# Patient Record
Sex: MALE | Race: WHITE | Employment: OTHER | URBAN - METROPOLITAN AREA
[De-identification: names, ages, dates, MRNs, and addresses within clinical notes are randomized per-mention and may not be internally consistent; named-entity substitution may affect disease eponyms.]

---

## 2019-01-31 RX ORDER — RUXOLITINIB 10 MG/1
TABLET ORAL
Qty: 60 TABLET | Refills: 3 | Status: SHIPPED | OUTPATIENT
Start: 2019-01-31 | End: 2019-05-24 | Stop reason: SDUPTHER

## 2019-03-21 ENCOUNTER — OFFICE VISIT (OUTPATIENT)
Dept: ONCOLOGY | Age: 69
End: 2019-03-21
Payer: COMMERCIAL

## 2019-03-21 VITALS
SYSTOLIC BLOOD PRESSURE: 145 MMHG | WEIGHT: 193.8 LBS | BODY MASS INDEX: 27.13 KG/M2 | HEART RATE: 82 BPM | DIASTOLIC BLOOD PRESSURE: 62 MMHG | TEMPERATURE: 97.7 F | HEIGHT: 71 IN

## 2019-03-21 DIAGNOSIS — D68.59 HYPERCOAGULABLE STATE (HCC): ICD-10-CM

## 2019-03-21 DIAGNOSIS — D47.1 MYELOPROLIFERATIVE DISORDER (HCC): ICD-10-CM

## 2019-03-21 DIAGNOSIS — D45 POLYCYTHEMIA RUBRA VERA (HCC): ICD-10-CM

## 2019-03-21 PROCEDURE — 99214 OFFICE O/P EST MOD 30 MIN: CPT | Performed by: INTERNAL MEDICINE

## 2019-03-21 RX ORDER — WARFARIN SODIUM 1 MG/1
TABLET ORAL
Refills: 0 | COMMUNITY
Start: 2019-02-03 | End: 2020-07-15 | Stop reason: DRUGHIGH

## 2019-03-21 RX ORDER — WARFARIN SODIUM 10 MG/1
TABLET ORAL
Refills: 0 | COMMUNITY
Start: 2018-12-24 | End: 2020-07-15 | Stop reason: DRUGHIGH

## 2019-03-21 RX ORDER — SAXAGLIPTIN AND METFORMIN HYDROCHLORIDE 5; 1000 MG/1; MG/1
TABLET, FILM COATED, EXTENDED RELEASE ORAL DAILY
Refills: 0 | COMMUNITY
Start: 2019-02-21

## 2019-03-21 RX ORDER — BISACODYL 5 MG/1
40 TABLET, SUGAR COATED ORAL DAILY
Refills: 0 | COMMUNITY
Start: 2019-02-01

## 2019-03-21 RX ORDER — PRIMIDONE 50 MG/1
50 TABLET ORAL
Refills: 0 | COMMUNITY
Start: 2019-03-10

## 2019-03-21 RX ORDER — LISINOPRIL AND HYDROCHLOROTHIAZIDE 25; 20 MG/1; MG/1
1 TABLET ORAL DAILY
Refills: 0 | COMMUNITY
Start: 2018-12-24

## 2019-03-21 ASSESSMENT — ENCOUNTER SYMPTOMS
CHEST TIGHTNESS: 0
VOICE CHANGE: 0
COUGH: 0
EYE DISCHARGE: 0
ABDOMINAL PAIN: 0
EYE REDNESS: 0
WHEEZING: 0
SORE THROAT: 0
SHORTNESS OF BREATH: 0
NAUSEA: 0
COLOR CHANGE: 0
DIARRHEA: 0
BLOOD IN STOOL: 0
EYE ITCHING: 0
CONSTIPATION: 0
VOMITING: 0
TROUBLE SWALLOWING: 0

## 2019-06-27 ENCOUNTER — OFFICE VISIT (OUTPATIENT)
Dept: ONCOLOGY | Age: 69
End: 2019-06-27
Payer: COMMERCIAL

## 2019-06-27 VITALS
RESPIRATION RATE: 18 BRPM | HEIGHT: 71 IN | SYSTOLIC BLOOD PRESSURE: 130 MMHG | TEMPERATURE: 97.8 F | WEIGHT: 195 LBS | DIASTOLIC BLOOD PRESSURE: 63 MMHG | BODY MASS INDEX: 27.3 KG/M2 | HEART RATE: 74 BPM

## 2019-06-27 DIAGNOSIS — D68.59 HYPERCOAGULABLE STATE (HCC): ICD-10-CM

## 2019-06-27 DIAGNOSIS — D47.1 MYELOPROLIFERATIVE DISORDER (HCC): ICD-10-CM

## 2019-06-27 DIAGNOSIS — D45 POLYCYTHEMIA RUBRA VERA (HCC): Primary | ICD-10-CM

## 2019-06-27 PROCEDURE — 99214 OFFICE O/P EST MOD 30 MIN: CPT | Performed by: INTERNAL MEDICINE

## 2019-06-27 RX ORDER — FERROUS SULFATE 325(65) MG
325 TABLET ORAL
COMMUNITY
Start: 2019-02-12 | End: 2020-02-28 | Stop reason: SDUPTHER

## 2019-06-27 ASSESSMENT — ENCOUNTER SYMPTOMS
EYE REDNESS: 0
EYE ITCHING: 0
SHORTNESS OF BREATH: 0
TROUBLE SWALLOWING: 0
CONSTIPATION: 0
COLOR CHANGE: 0
EYE DISCHARGE: 0
COUGH: 0
VOICE CHANGE: 0
VOMITING: 0
WHEEZING: 0
CHEST TIGHTNESS: 0
BLOOD IN STOOL: 0
NAUSEA: 0
ABDOMINAL PAIN: 0
SORE THROAT: 0
DIARRHEA: 0

## 2019-06-27 NOTE — LETTER
6/27/2019     Josiane Ahumada MD   [address]    Dear Dr. Josiane Ahumada MD, and Dr. Tiffany Hancock ref. provider found: Thank you for referring Bia Irvin, 1950, to me for evaluation. Below are the relevant portions of my assessment and plan of care. [facility]  [facility]  [address]  [place] [address]  Dept: [phone]  Dept Fax: [phone]  Meri Lantigua is a 77 y.o. male who presentstoday for follow up of his   Chief Complaint   Patient presents with    Follow-up     polycythemia         HPI  Bia Irvin is a 69-year-old gentleman with history of Maile Clink 2 positive myeloproliferative disorder with thrombocytosis and polycythemia. Had been on hydroxyurea for quite some time but lately has been on Jakafi 10 mg twice a day and his blood counts have been reasonably good he also has a hypercoagulable state secondary to factor V Leiden mutation positivity and lupus anticoagulant and is on ongoing coumadinization and aspirin. Not had any episodes of deep vein thrombosis. CAT scan of the abdomen on 8/23/16 and showed increase in size of the spleen. Bone marrow aspirate and biopsy showed markedly hypercellular marrow with trilineage hematopoiesis and increased megakaryocytes and myelofibrosis suggestive of a myeloproliferative neoplasm. He is doing reasonably good. He has had mild iron deficiency and is on oral iron which has corrected with iron deficiency. On 6/22/19 his hemoglobin was 13.3. White count 10.5 and platelets were 098.      Current Outpatient Medications   Medication Sig Dispense Refill    Multiple Vitamins-Minerals (MULTIVITAMIN ADULT PO) Take 1 tablet by mouth      ferrous sulfate 325 (65 Fe) MG tablet Take 325 mg by mouth      VITAMIN C, CALCIUM ASCORBATE, PO Take by mouth      aspirin 81 MG tablet Take 81 mg by mouth      OMEGA-3 FATTY ACIDS-VITAMIN E PO Take 1 capsule by mouth  JAKAFI 10 MG tablet TAKE 1 TABLET 2 TIMES A DAY 60 tablet 5    lisinopril-hydrochlorothiazide (PRINZIDE;ZESTORETIC) 20-25 MG per tablet   0    warfarin (COUMADIN) 1 MG tablet   0    warfarin (COUMADIN) 10 MG tablet   0    KOMBIGLYZE XR 2.5-1000 MG TB24   0    primidone (MYSOLINE) 50 MG tablet   0    RA OMEPRAZOLE 20 MG EC tablet take 1 tablet by mouth twice a day  0     No current facility-administered medications for this visit. No Known Allergies    History reviewed. No pertinent past medical history. History reviewed. No pertinent surgical history. History reviewed. No pertinent family history. Social History     Tobacco Use    Smoking status: Former Smoker     Last attempt to quit: 1981     Years since quittin.5    Smokeless tobacco: Never Used    Tobacco comment: quit smoking 38 years ago   Substance Use Topics    Alcohol use: Not Currently     Comment: Quit drinking          The Past MedicalHistory, Past Surgical History, Past Family History and Past Social History havebeen reviewed      Review of Systems   Constitutional: Negative for activity change, appetite change, chills, fatigue and fever. HENT: Negative for congestion, hearing loss, mouth sores, nosebleeds, sore throat, tinnitus, trouble swallowing and voice change. Eyes: Negative for discharge, redness, itching and visual disturbance. Respiratory: Negative for cough, chest tightness, shortness of breath and wheezing. Cardiovascular: Negative for chest pain and leg swelling. Gastrointestinal: Negative for abdominal pain, blood in stool, constipation, diarrhea, nausea and vomiting. Genitourinary: Negative for decreased urine volume, difficulty urinating, hematuria and urgency. Musculoskeletal: Negative for arthralgias, joint swelling and myalgias. Skin: Negative for color change, pallor and rash. Neurological: Negative for dizziness, weakness, light-headedness, numbness and headaches. Hematological: Negative for adenopathy. Does not bruise/bleed easily. Psychiatric/Behavioral: Negative for behavioral problems and confusion. Physical Exam   Constitutional: He is oriented to person, place, and time. He appears well-developed and well-nourished. No distress. HENT:   Head: Normocephalic. Eyes: Pupils are equal, round, and reactive to light. No scleral icterus. Neck: Neck supple. No thyromegaly present. Cardiovascular: Normal rate and regular rhythm. Murmur heard. Systolic murmur is present with a grade of 3/6. Pulmonary/Chest: Effort normal and breath sounds normal. No respiratory distress. He has no wheezes. He has no rales. Abdominal: Soft. He exhibits no mass. There is no hepatosplenomegaly. There is no tenderness. Musculoskeletal: He exhibits no edema or tenderness. Lymphadenopathy:     He has no cervical adenopathy. He has no axillary adenopathy. Neurological: He is alert and oriented to person, place, and time. No cranial nerve deficit. Skin: Skin is warm and dry. No cyanosis. Nails show no clubbing. Psychiatric: He has a normal mood and affect. His behavior is normal. Thought content normal.   Nursing note and vitals reviewed. No results found for this or any previous visit. ASSESSMENT:      Diagnosis Orders   1. Polycythemia rubra vera ([facility] [address]  CBC Auto Differential    Comprehensive Metabolic Panel   2. Myeloproliferative disorder ([address]     3. Hypercoagulable state ([facility] [address]  CBC Auto Differential    Comprehensive Metabolic Panel     Myeloproliferative disorder with WALLY 2 positivity. He is doing well with the current doses of Jakafi. Hypercoagulable state. Patient is on ongoing coumadinization and is doing well without any recurrent episodes of thrombosis. We'll continue monitoring his CBC monthly and see him back again in 3 months. PLAN:      Return in about 3 months (around 9/27/2019) for polycythemia, hypercoagulable state. Orders Placed This Encounter   Procedures    CBC Auto Differential     Standing Status:   Future     Standing Expiration Date:   6/27/2020    Comprehensive Metabolic Panel     Standing Status:   Future     Standing Expiration Date:   6/27/2020     No orders of the defined types were placed in this encounter. Electronicallysigned by Shekhar Hicks MD on 6/27/2019 at 3:35 PM      If you have questions, please do not hesitate to call me. I look forward to following Vinicio Jimenez along with you.     Sincerely,        Shekhar Hicks MD  Phone: [phone]

## 2019-06-27 NOTE — PROGRESS NOTES
OMEPRAZOLE 20 MG EC tablet take 1 tablet by mouth twice a day  0     No current facility-administered medications for this visit. No Known Allergies    History reviewed. No pertinent past medical history. History reviewed. No pertinent surgical history. History reviewed. No pertinent family history. Social History     Tobacco Use    Smoking status: Former Smoker     Last attempt to quit: 1981     Years since quittin.5    Smokeless tobacco: Never Used    Tobacco comment: quit smoking 38 years ago   Substance Use Topics    Alcohol use: Not Currently     Comment: Quit drinking          The Past MedicalHistory, Past Surgical History, Past Family History and Past Social History havebeen reviewed      Review of Systems   Constitutional: Negative for activity change, appetite change, chills, fatigue and fever. HENT: Negative for congestion, hearing loss, mouth sores, nosebleeds, sore throat, tinnitus, trouble swallowing and voice change. Eyes: Negative for discharge, redness, itching and visual disturbance. Respiratory: Negative for cough, chest tightness, shortness of breath and wheezing. Cardiovascular: Negative for chest pain and leg swelling. Gastrointestinal: Negative for abdominal pain, blood in stool, constipation, diarrhea, nausea and vomiting. Genitourinary: Negative for decreased urine volume, difficulty urinating, hematuria and urgency. Musculoskeletal: Negative for arthralgias, joint swelling and myalgias. Skin: Negative for color change, pallor and rash. Neurological: Negative for dizziness, weakness, light-headedness, numbness and headaches. Hematological: Negative for adenopathy. Does not bruise/bleed easily. Psychiatric/Behavioral: Negative for behavioral problems and confusion. Physical Exam   Constitutional: He is oriented to person, place, and time. He appears well-developed and well-nourished. No distress. HENT:   Head: Normocephalic.    Eyes: Pupils are equal, round, and reactive to light. No scleral icterus. Neck: Neck supple. No thyromegaly present. Cardiovascular: Normal rate and regular rhythm. Murmur heard. Systolic murmur is present with a grade of 3/6. Pulmonary/Chest: Effort normal and breath sounds normal. No respiratory distress. He has no wheezes. He has no rales. Abdominal: Soft. He exhibits no mass. There is no hepatosplenomegaly. There is no tenderness. Musculoskeletal: He exhibits no edema or tenderness. Lymphadenopathy:     He has no cervical adenopathy. He has no axillary adenopathy. Neurological: He is alert and oriented to person, place, and time. No cranial nerve deficit. Skin: Skin is warm and dry. No cyanosis. Nails show no clubbing. Psychiatric: He has a normal mood and affect. His behavior is normal. Thought content normal.   Nursing note and vitals reviewed. No results found for this or any previous visit. ASSESSMENT:      Diagnosis Orders   1. Polycythemia rubra vera ([facility] [address]  CBC Auto Differential    Comprehensive Metabolic Panel   2. Myeloproliferative disorder ([facility] [address]     3. Hypercoagulable state ([facility] [address]  CBC Auto Differential    Comprehensive Metabolic Panel     Myeloproliferative disorder with WALLY 2 positivity. He is doing well with the current doses of Jakafi. Hypercoagulable state. Patient is on ongoing coumadinization and is doing well without any recurrent episodes of thrombosis. We'll continue monitoring his CBC monthly and see him back again in 3 months. PLAN:      Return in about 3 months (around 9/27/2019) for polycythemia, hypercoagulable state. Orders Placed This Encounter   Procedures    CBC Auto Differential     Standing Status:   Future     Standing Expiration Date:   6/27/2020    Comprehensive Metabolic Panel     Standing Status:   Future     Standing Expiration Date:   6/27/2020     No orders of the defined types were placed in this encounter. Electronicallysigned by Sunny Wyman MD on 6/27/2019 at 3:35 PM

## 2019-09-30 ENCOUNTER — OFFICE VISIT (OUTPATIENT)
Dept: ONCOLOGY | Age: 69
End: 2019-09-30
Payer: COMMERCIAL

## 2019-09-30 VITALS
BODY MASS INDEX: 28.03 KG/M2 | WEIGHT: 201 LBS | HEART RATE: 72 BPM | TEMPERATURE: 98 F | RESPIRATION RATE: 18 BRPM | DIASTOLIC BLOOD PRESSURE: 72 MMHG | SYSTOLIC BLOOD PRESSURE: 129 MMHG

## 2019-09-30 DIAGNOSIS — D68.59 HYPERCOAGULABLE STATE (HCC): ICD-10-CM

## 2019-09-30 DIAGNOSIS — D45 POLYCYTHEMIA RUBRA VERA (HCC): Primary | ICD-10-CM

## 2019-09-30 PROCEDURE — 99214 OFFICE O/P EST MOD 30 MIN: CPT | Performed by: INTERNAL MEDICINE

## 2019-09-30 ASSESSMENT — ENCOUNTER SYMPTOMS
ABDOMINAL PAIN: 0
COUGH: 0
NAUSEA: 0
EYE ITCHING: 0
SHORTNESS OF BREATH: 0
COLOR CHANGE: 0
CHEST TIGHTNESS: 0
EYE REDNESS: 0
TROUBLE SWALLOWING: 0
CONSTIPATION: 0
VOICE CHANGE: 0
SORE THROAT: 0
WHEEZING: 0
EYE DISCHARGE: 0
DIARRHEA: 0
VOMITING: 0
BLOOD IN STOOL: 0

## 2019-09-30 NOTE — LETTER
Standing Expiration Date:   9/30/2020    Comprehensive Metabolic Panel     Standing Status:   Future     Standing Expiration Date:   9/30/2020     No orders of the defined types were placed in this encounter. Electronicallysigned by Jay Santana MD on 9/30/2019 at 4:26 PM      If you have questions, please do not hesitate to call me. I look forward to following Leigh Ann Fleming along with you.     Sincerely,        Jay Santana MD  Phone: [phone]

## 2019-09-30 NOTE — PROGRESS NOTES
Pupils are equal, round, and reactive to light. No scleral icterus. Neck: Neck supple. No thyromegaly present. Cardiovascular: Normal rate and regular rhythm. No murmur heard. Pulmonary/Chest: Effort normal and breath sounds normal. No respiratory distress. He has no wheezes. He has no rales. Abdominal: Soft. He exhibits no mass. There is no hepatosplenomegaly. There is no tenderness. Musculoskeletal: He exhibits no edema or tenderness. Lymphadenopathy:     He has no cervical adenopathy. He has no axillary adenopathy. Neurological: He is alert and oriented to person, place, and time. No cranial nerve deficit. Skin: Skin is warm and dry. No cyanosis. Nails show no clubbing. Psychiatric: He has a normal mood and affect. His behavior is normal. Thought content normal.   Nursing note and vitals reviewed. No results found for this or any previous visit. ASSESSMENT:      Diagnosis Orders   1. Polycythemia rubra vera ([facility] [address]  CBC Auto Differential    Comprehensive Metabolic Panel   2. Hypercoagulable state ([address]  CBC Auto Differential    Comprehensive Metabolic Panel     Myeloproliferative disorder with WALLY 2 positivity. He is doing well with the current doses of Jakafi. Hypercoagulable state. Patient is on ongoing coumadinization and is doing well without any recurrent episodes of thrombosis. We'll continue monitoring his CBC monthly and see him back again in 3 months. PLAN:      Return in about 3 months (around 12/30/2019) for polycythemia, hypercoagulable state. Orders Placed This Encounter   Procedures    CBC Auto Differential     Standing Status:   Future     Standing Expiration Date:   9/30/2020    Comprehensive Metabolic Panel     Standing Status:   Future     Standing Expiration Date:   9/30/2020     No orders of the defined types were placed in this encounter.           Electronicallysigned by Beck Cronin MD on 9/30/2019 at 4:26 PM

## 2019-12-23 ENCOUNTER — OFFICE VISIT (OUTPATIENT)
Dept: ONCOLOGY | Age: 69
End: 2019-12-23
Payer: COMMERCIAL

## 2019-12-23 VITALS
RESPIRATION RATE: 18 BRPM | WEIGHT: 194 LBS | DIASTOLIC BLOOD PRESSURE: 82 MMHG | TEMPERATURE: 97.7 F | HEART RATE: 89 BPM | SYSTOLIC BLOOD PRESSURE: 132 MMHG | BODY MASS INDEX: 27.06 KG/M2

## 2019-12-23 DIAGNOSIS — D45 POLYCYTHEMIA RUBRA VERA (HCC): ICD-10-CM

## 2019-12-23 DIAGNOSIS — D47.1 MYELOPROLIFERATIVE DISORDER (HCC): ICD-10-CM

## 2019-12-23 DIAGNOSIS — D68.59 HYPERCOAGULABLE STATE (HCC): Primary | ICD-10-CM

## 2019-12-23 PROCEDURE — 99214 OFFICE O/P EST MOD 30 MIN: CPT | Performed by: INTERNAL MEDICINE

## 2019-12-23 ASSESSMENT — ENCOUNTER SYMPTOMS
BLOOD IN STOOL: 0
NAUSEA: 0
WHEEZING: 0
DIARRHEA: 0
SHORTNESS OF BREATH: 0
VOICE CHANGE: 0
EYE ITCHING: 0
CONSTIPATION: 0
SORE THROAT: 0
VOMITING: 0
TROUBLE SWALLOWING: 0
COLOR CHANGE: 0
EYE REDNESS: 0
EYE DISCHARGE: 0
ABDOMINAL PAIN: 0
COUGH: 0
CHEST TIGHTNESS: 0

## 2020-03-04 RX ORDER — FERROUS SULFATE 325(65) MG
325 TABLET ORAL DAILY
Qty: 30 TABLET | Refills: 1 | Status: SHIPPED | OUTPATIENT
Start: 2020-03-04 | End: 2020-06-02 | Stop reason: SDUPTHER

## 2020-04-06 ENCOUNTER — HOSPITAL ENCOUNTER (OUTPATIENT)
Age: 70
Discharge: HOME | End: 2020-04-06
Payer: COMMERCIAL

## 2020-04-06 ENCOUNTER — HOSPITAL ENCOUNTER (OUTPATIENT)
Dept: LAB | Age: 70
Discharge: HOME | End: 2020-04-06
Payer: COMMERCIAL

## 2020-04-06 LAB
ABSOLUTE EOS #: 0.08 K/UL (ref 0–0.44)
ABSOLUTE IMMATURE GRANULOCYTE: 0.16 K/UL (ref 0–0.3)
ABSOLUTE LYMPH #: 1.44 K/UL (ref 1.1–3.7)
ABSOLUTE MONO #: 0.48 K/UL (ref 0.1–1.2)
ALBUMIN SERPL-MCNC: 5.2 G/DL (ref 3.5–5.2)
ALBUMIN/GLOBULIN RATIO: 2.5 (ref 1–2.5)
ALP BLD-CCNC: 63 U/L (ref 40–129)
ALT SERPL-CCNC: 37 U/L (ref 5–41)
ANION GAP SERPL CALCULATED.3IONS-SCNC: 12 MMOL/L (ref 9–17)
AST SERPL-CCNC: 29 U/L
BASOPHILS # BLD: 2 % (ref 0–2)
BASOPHILS ABSOLUTE: 0.16 K/UL (ref 0–0.2)
BILIRUB SERPL-MCNC: 0.52 MG/DL (ref 0.3–1.2)
BUN BLDV-MCNC: 12 MG/DL (ref 8–23)
BUN/CREAT BLD: 13 (ref 9–20)
CALCIUM SERPL-MCNC: 9.7 MG/DL (ref 8.6–10.4)
CHLORIDE BLD-SCNC: 92 MMOL/L (ref 98–107)
CO2: 26 MMOL/L (ref 20–31)
CREAT SERPL-MCNC: 0.9 MG/DL (ref 0.7–1.2)
DIFFERENTIAL TYPE: ABNORMAL
EOSINOPHILS RELATIVE PERCENT: 1 % (ref 1–4)
GFR AFRICAN AMERICAN: >60 ML/MIN
GFR NON-AFRICAN AMERICAN: >60 ML/MIN
GFR SERPL CREATININE-BSD FRML MDRD: ABNORMAL ML/MIN/{1.73_M2}
GFR SERPL CREATININE-BSD FRML MDRD: ABNORMAL ML/MIN/{1.73_M2}
GLUCOSE BLD-MCNC: 249 MG/DL (ref 70–99)
HCT VFR BLD CALC: 40.8 % (ref 40.7–50.3)
HEMOGLOBIN: 13.4 G/DL (ref 13–17)
IMMATURE GRANULOCYTES: 2 %
INR BLD: 2 (ref 0.9–1.2)
LYMPHOCYTES # BLD: 18 % (ref 24–43)
MCH RBC QN AUTO: 29.6 PG (ref 25.2–33.5)
MCHC RBC AUTO-ENTMCNC: 32.8 G/DL (ref 28.4–34.8)
MCV RBC AUTO: 90.1 FL (ref 82.6–102.9)
MONOCYTES # BLD: 6 % (ref 3–12)
MORPHOLOGY: NORMAL
NRBC AUTOMATED: 1.5 PER 100 WBC
NUCLEATED RED BLOOD CELLS: 1 PER 100 WBC (ref 0–5)
PDW BLD-RTO: 16.7 % (ref 11.8–14.4)
PLATELET # BLD: 165 K/UL (ref 138–453)
PLATELET ESTIMATE: ABNORMAL
PMV BLD AUTO: 10.2 FL (ref 8.1–13.5)
POTASSIUM SERPL-SCNC: 4.6 MMOL/L (ref 3.7–5.3)
PROTHROMBIN TIME: 19.8 SEC (ref 9.7–12.2)
RBC # BLD: 4.53 M/UL (ref 4.21–5.77)
RBC # BLD: ABNORMAL 10*6/UL
SEG NEUTROPHILS: 71 % (ref 36–65)
SEGMENTED NEUTROPHILS ABSOLUTE COUNT: 5.7 K/UL (ref 1.5–8.1)
SODIUM BLD-SCNC: 130 MMOL/L (ref 135–144)
TOTAL PROTEIN: 7.3 G/DL (ref 6.4–8.3)
WBC # BLD: 8 K/UL (ref 3.5–11.3)
WBC # BLD: ABNORMAL 10*3/UL

## 2020-04-06 PROCEDURE — 85025 COMPLETE CBC W/AUTO DIFF WBC: CPT

## 2020-04-06 PROCEDURE — 36415 COLL VENOUS BLD VENIPUNCTURE: CPT

## 2020-04-06 PROCEDURE — 85610 PROTHROMBIN TIME: CPT

## 2020-04-06 PROCEDURE — 80053 COMPREHEN METABOLIC PANEL: CPT

## 2020-04-08 ENCOUNTER — OFFICE VISIT (OUTPATIENT)
Dept: ONCOLOGY | Age: 70
End: 2020-04-08
Payer: COMMERCIAL

## 2020-04-08 VITALS
BODY MASS INDEX: 28.77 KG/M2 | RESPIRATION RATE: 18 BRPM | HEIGHT: 71 IN | WEIGHT: 205.5 LBS | HEART RATE: 81 BPM | TEMPERATURE: 98.1 F | SYSTOLIC BLOOD PRESSURE: 151 MMHG | DIASTOLIC BLOOD PRESSURE: 65 MMHG

## 2020-04-08 PROCEDURE — 99215 OFFICE O/P EST HI 40 MIN: CPT | Performed by: INTERNAL MEDICINE

## 2020-04-08 RX ORDER — RUXOLITINIB 10 MG/1
TABLET ORAL
Qty: 60 TABLET | Refills: 5 | Status: SHIPPED | OUTPATIENT
Start: 2020-04-08 | End: 2020-09-30 | Stop reason: SDUPTHER

## 2020-04-08 NOTE — PATIENT INSTRUCTIONS
Continue current therapy without changes, return in 3 months with CBC, CMP LDH prior to next visit. Continue to follow with Coumadin clinic.

## 2020-04-08 NOTE — PROGRESS NOTES
DIAGNOSIS:   1. History of myeloproliferative disorder , Malcom 2+, likely polycythemia diagnosed in 2016  2. Factor V Leiden mutation and lupus anticoagulant  3. Spent phase polycythemia/myelofibrosis transformation diagnosed in 2019  CURRENT THERAPY:  1. Started hydroxyurea and aspirin  2. Upon diagnosis of myelofibrosis, transition to [facility]  3. Long-term anticoagulation with Coumadin  BRIEF CASE HISTORY:   Afshin Cartwright is a very pleasant 72 y.o. male who was followed by different hematologist, he was diagnosed with myeloproliferative syndrome likely polycythemia vera where he presented with polycythemia and thrombocytosis and he was Malcom 2+. He was started on hydroxyurea for multiple years. In 2019, he started having worsening cytopenias and bone marrow aspiration biopsy showed hypercellular marrow but increased megakaryocyte and increased fibrotic changes suggestive of spent phase polycythemia/myelofibrosis. He was started on Jakafi. The patient also has a history of hypercoagulable condition namely factor V Leiden and lupus anticoagulant and he is on long-term anticoagulation. INTERIM HISTORY:  The patient comes in today for a follow up, feeling well and doing well. He has no fever or chills, no night sweats or weight loss. No abdominal pain or diarrhea. He is tolerating medication very well. He is on Coumadin other than mild bruisability he has no complaints. PAST MEDICAL HISTORY: has no past medical history on file. PAST SURGICAL HISTORY: has no past surgical history on file. CURRENT MEDICATIONS:  has a current medication list which includes the following prescription(s): ferrous sulfate, jakafi, multiple vitamins-minerals, calcium ascorbate, aspirin, omega-3 fatty acids-vitamin e, lisinopril-hydrochlorothiazide, warfarin, warfarin, kombiglyze xr, primidone, and ra omeprazole. ALLERGIES:  has No Known Allergies.     FAMILY HISTORY: Negative for any hematological or oncological

## 2020-06-02 RX ORDER — FERROUS SULFATE 325(65) MG
325 TABLET ORAL DAILY
Qty: 30 TABLET | Refills: 1 | Status: SHIPPED | OUTPATIENT
Start: 2020-06-02 | End: 2022-03-09 | Stop reason: ALTCHOICE

## 2020-06-13 ENCOUNTER — HOSPITAL ENCOUNTER (OUTPATIENT)
Age: 70
Discharge: HOME | End: 2020-06-13
Payer: COMMERCIAL

## 2020-06-13 LAB
ABSOLUTE EOS #: 0.1 K/UL (ref 0–0.44)
ABSOLUTE IMMATURE GRANULOCYTE: 0.57 K/UL (ref 0–0.3)
ABSOLUTE LYMPH #: 1.33 K/UL (ref 1.1–3.7)
ABSOLUTE MONO #: 0.57 K/UL (ref 0.1–1.2)
ALBUMIN SERPL-MCNC: 5.1 G/DL (ref 3.5–5.2)
ALBUMIN/GLOBULIN RATIO: 2.4 (ref 1–2.5)
ALP BLD-CCNC: 61 U/L (ref 40–129)
ALT SERPL-CCNC: 27 U/L (ref 5–41)
ANION GAP SERPL CALCULATED.3IONS-SCNC: 13 MMOL/L (ref 9–17)
AST SERPL-CCNC: 26 U/L
ATYPICAL LYMPHOCYTE ABSOLUTE COUNT: 0.19 K/UL
ATYPICAL LYMPHOCYTES: 2 %
BASOPHILS # BLD: 0 % (ref 0–2)
BASOPHILS ABSOLUTE: 0 K/UL (ref 0–0.2)
BILIRUB SERPL-MCNC: 0.51 MG/DL (ref 0.3–1.2)
BUN BLDV-MCNC: 16 MG/DL (ref 8–23)
BUN/CREAT BLD: 18 (ref 9–20)
CALCIUM SERPL-MCNC: 9.3 MG/DL (ref 8.6–10.4)
CHLORIDE BLD-SCNC: 95 MMOL/L (ref 98–107)
CO2: 26 MMOL/L (ref 20–31)
CREAT SERPL-MCNC: 0.91 MG/DL (ref 0.7–1.2)
DIFFERENTIAL TYPE: ABNORMAL
EOSINOPHILS RELATIVE PERCENT: 1 % (ref 1–4)
GFR AFRICAN AMERICAN: >60 ML/MIN
GFR NON-AFRICAN AMERICAN: >60 ML/MIN
GFR SERPL CREATININE-BSD FRML MDRD: ABNORMAL ML/MIN/{1.73_M2}
GFR SERPL CREATININE-BSD FRML MDRD: ABNORMAL ML/MIN/{1.73_M2}
GLUCOSE BLD-MCNC: 148 MG/DL (ref 70–99)
HCT VFR BLD CALC: 42 % (ref 40.7–50.3)
HEMOGLOBIN: 14 G/DL (ref 13–17)
IMMATURE GRANULOCYTES: 6 %
INR BLD: 2.1
LACTATE DEHYDROGENASE: 475 U/L (ref 135–225)
LYMPHOCYTES # BLD: 14 % (ref 24–43)
MCH RBC QN AUTO: 29.6 PG (ref 25.2–33.5)
MCHC RBC AUTO-ENTMCNC: 33.3 G/DL (ref 28.4–34.8)
MCV RBC AUTO: 88.8 FL (ref 82.6–102.9)
MONOCYTES # BLD: 6 % (ref 3–12)
MORPHOLOGY: NORMAL
NRBC AUTOMATED: 0.8 PER 100 WBC
NUCLEATED RED BLOOD CELLS: 1 PER 100 WBC (ref 0–5)
PDW BLD-RTO: 16 % (ref 11.8–14.4)
PLATELET # BLD: 143 K/UL (ref 138–453)
PLATELET ESTIMATE: ABNORMAL
PMV BLD AUTO: 10 FL (ref 8.1–13.5)
POTASSIUM SERPL-SCNC: 4.2 MMOL/L (ref 3.7–5.3)
PROTHROMBIN TIME: 23.1 SEC (ref 11.8–14.6)
RBC # BLD: 4.73 M/UL (ref 4.21–5.77)
RBC # BLD: ABNORMAL 10*6/UL
SEG NEUTROPHILS: 71 % (ref 36–65)
SEGMENTED NEUTROPHILS ABSOLUTE COUNT: 6.74 K/UL (ref 1.5–8.1)
SODIUM BLD-SCNC: 134 MMOL/L (ref 135–144)
TOTAL PROTEIN: 7.2 G/DL (ref 6.4–8.3)
WBC # BLD: 9.5 K/UL (ref 3.5–11.3)
WBC # BLD: ABNORMAL 10*3/UL

## 2020-06-13 PROCEDURE — 85025 COMPLETE CBC W/AUTO DIFF WBC: CPT

## 2020-06-13 PROCEDURE — 85610 PROTHROMBIN TIME: CPT

## 2020-06-13 PROCEDURE — 80053 COMPREHEN METABOLIC PANEL: CPT

## 2020-06-13 PROCEDURE — 36415 COLL VENOUS BLD VENIPUNCTURE: CPT

## 2020-06-13 PROCEDURE — 83615 LACTATE (LD) (LDH) ENZYME: CPT

## 2020-07-06 ENCOUNTER — HOSPITAL ENCOUNTER (OUTPATIENT)
Age: 70
Discharge: HOME | End: 2020-07-06
Payer: COMMERCIAL

## 2020-07-06 LAB
ABSOLUTE EOS #: 0.08 K/UL (ref 0–0.44)
ABSOLUTE IMMATURE GRANULOCYTE: 0.63 K/UL (ref 0–0.3)
ABSOLUTE LYMPH #: 0.63 K/UL (ref 1.1–3.7)
ABSOLUTE MONO #: 0.63 K/UL (ref 0.1–1.2)
ALBUMIN SERPL-MCNC: 5 G/DL (ref 3.5–5.2)
ALBUMIN/GLOBULIN RATIO: 2.3 (ref 1–2.5)
ALP BLD-CCNC: 60 U/L (ref 40–129)
ALT SERPL-CCNC: 27 U/L (ref 5–41)
ANION GAP SERPL CALCULATED.3IONS-SCNC: 13 MMOL/L (ref 9–17)
AST SERPL-CCNC: 26 U/L
BASOPHILS # BLD: 0 % (ref 0–2)
BASOPHILS ABSOLUTE: 0 K/UL (ref 0–0.2)
BILIRUB SERPL-MCNC: 0.4 MG/DL (ref 0.3–1.2)
BUN BLDV-MCNC: 13 MG/DL (ref 8–23)
BUN/CREAT BLD: 14 (ref 9–20)
CALCIUM SERPL-MCNC: 9.7 MG/DL (ref 8.6–10.4)
CHLORIDE BLD-SCNC: 99 MMOL/L (ref 98–107)
CO2: 25 MMOL/L (ref 20–31)
CREAT SERPL-MCNC: 0.93 MG/DL (ref 0.7–1.2)
DIFFERENTIAL TYPE: ABNORMAL
EOSINOPHILS RELATIVE PERCENT: 1 % (ref 1–4)
GFR AFRICAN AMERICAN: >60 ML/MIN
GFR NON-AFRICAN AMERICAN: >60 ML/MIN
GFR SERPL CREATININE-BSD FRML MDRD: ABNORMAL ML/MIN/{1.73_M2}
GFR SERPL CREATININE-BSD FRML MDRD: ABNORMAL ML/MIN/{1.73_M2}
GLUCOSE BLD-MCNC: 151 MG/DL (ref 70–99)
HCT VFR BLD CALC: 41.6 % (ref 40.7–50.3)
HEMOGLOBIN: 13.6 G/DL (ref 13–17)
IMMATURE GRANULOCYTES: 8 %
INR BLD: 2.1
LYMPHOCYTES # BLD: 8 % (ref 24–43)
MCH RBC QN AUTO: 28.9 PG (ref 25.2–33.5)
MCHC RBC AUTO-ENTMCNC: 32.7 G/DL (ref 28.4–34.8)
MCV RBC AUTO: 88.3 FL (ref 82.6–102.9)
MONOCYTES # BLD: 8 % (ref 3–12)
MORPHOLOGY: NORMAL
NRBC AUTOMATED: 1.3 PER 100 WBC
PDW BLD-RTO: 16.2 % (ref 11.8–14.4)
PLATELET # BLD: 144 K/UL (ref 138–453)
PLATELET ESTIMATE: ABNORMAL
PMV BLD AUTO: 9.5 FL (ref 8.1–13.5)
POTASSIUM SERPL-SCNC: 4.5 MMOL/L (ref 3.7–5.3)
PROTHROMBIN TIME: 23.8 SEC (ref 11.8–14.6)
RBC # BLD: 4.71 M/UL (ref 4.21–5.77)
RBC # BLD: ABNORMAL 10*6/UL
SEG NEUTROPHILS: 75 % (ref 36–65)
SEGMENTED NEUTROPHILS ABSOLUTE COUNT: 5.93 K/UL (ref 1.5–8.1)
SODIUM BLD-SCNC: 137 MMOL/L (ref 135–144)
TOTAL PROTEIN: 7.2 G/DL (ref 6.4–8.3)
WBC # BLD: 7.9 K/UL (ref 3.5–11.3)
WBC # BLD: ABNORMAL 10*3/UL

## 2020-07-06 PROCEDURE — 85025 COMPLETE CBC W/AUTO DIFF WBC: CPT

## 2020-07-06 PROCEDURE — 80053 COMPREHEN METABOLIC PANEL: CPT

## 2020-07-06 PROCEDURE — 36415 COLL VENOUS BLD VENIPUNCTURE: CPT

## 2020-07-06 PROCEDURE — 85610 PROTHROMBIN TIME: CPT

## 2020-07-15 ENCOUNTER — OFFICE VISIT (OUTPATIENT)
Dept: ONCOLOGY | Age: 70
End: 2020-07-15
Payer: COMMERCIAL

## 2020-07-15 VITALS
SYSTOLIC BLOOD PRESSURE: 122 MMHG | RESPIRATION RATE: 16 BRPM | HEIGHT: 71 IN | HEART RATE: 73 BPM | TEMPERATURE: 97.8 F | BODY MASS INDEX: 28.31 KG/M2 | WEIGHT: 202.2 LBS | DIASTOLIC BLOOD PRESSURE: 53 MMHG

## 2020-07-15 PROCEDURE — G8427 DOCREV CUR MEDS BY ELIG CLIN: HCPCS | Performed by: INTERNAL MEDICINE

## 2020-07-15 PROCEDURE — 99214 OFFICE O/P EST MOD 30 MIN: CPT | Performed by: INTERNAL MEDICINE

## 2020-07-15 PROCEDURE — 1123F ACP DISCUSS/DSCN MKR DOCD: CPT | Performed by: INTERNAL MEDICINE

## 2020-07-15 PROCEDURE — 1036F TOBACCO NON-USER: CPT | Performed by: INTERNAL MEDICINE

## 2020-07-15 PROCEDURE — 4040F PNEUMOC VAC/ADMIN/RCVD: CPT | Performed by: INTERNAL MEDICINE

## 2020-07-15 PROCEDURE — G8417 CALC BMI ABV UP PARAM F/U: HCPCS | Performed by: INTERNAL MEDICINE

## 2020-07-15 PROCEDURE — 3017F COLORECTAL CA SCREEN DOC REV: CPT | Performed by: INTERNAL MEDICINE

## 2020-07-15 RX ORDER — TAMSULOSIN HYDROCHLORIDE 0.4 MG/1
CAPSULE ORAL
COMMUNITY
Start: 2020-07-03

## 2020-07-15 RX ORDER — WARFARIN SODIUM 5 MG/1
TABLET ORAL
Status: ON HOLD | COMMUNITY
Start: 2020-06-24 | End: 2022-02-06 | Stop reason: HOSPADM

## 2020-07-15 RX ORDER — WARFARIN SODIUM 2 MG/1
TABLET ORAL DAILY
Status: ON HOLD | COMMUNITY
Start: 2020-07-08 | End: 2022-02-06 | Stop reason: HOSPADM

## 2020-07-15 RX ORDER — FERROUS SULFATE 325(65) MG
325 TABLET ORAL DAILY
Qty: 90 TABLET | Refills: 1 | Status: CANCELLED | OUTPATIENT
Start: 2020-07-15 | End: 2020-09-13

## 2020-07-15 NOTE — PROGRESS NOTES
DIAGNOSIS:   1. History of myeloproliferative disorder , Malcom 2+, likely polycythemia diagnosed in 2016  2. Factor V Leiden mutation and lupus anticoagulant  3. Spent phase polycythemia/myelofibrosis transformation diagnosed in 2019  CURRENT THERAPY:  1. Started hydroxyurea and aspirin  2. Upon diagnosis of myelofibrosis, transition to [facility]  3. Long-term anticoagulation with Coumadin  BRIEF CASE HISTORY:   Juan Koehler is a very pleasant 72 y.o. male who was followed by different hematologist, he was diagnosed with myeloproliferative syndrome likely polycythemia vera where he presented with polycythemia and thrombocytosis and he was Malcom 2+. He was started on hydroxyurea for multiple years. In 2019, he started having worsening cytopenias and bone marrow aspiration biopsy showed hypercellular marrow but increased megakaryocyte and increased fibrotic changes suggestive of spent phase polycythemia/myelofibrosis. He was started on Jakafi. The patient also has a history of hypercoagulable condition namely factor V Leiden and lupus anticoagulant and he is on long-term anticoagulation. INTERIM HISTORY:  The patient comes in today for a follow up, feeling well and doing well. He has no fever or chills, no night sweats or weight loss. No abdominal pain or diarrhea. He is tolerating medication very well. He is on Coumadin other than mild bruisability he has no complaints. Overall condition has been stable without any changes. PAST MEDICAL HISTORY: has no past medical history on file. PAST SURGICAL HISTORY: has no past surgical history on file. CURRENT MEDICATIONS:  has a current medication list which includes the following prescription(s): tamsulosin, warfarin, warfarin, ferrous sulfate, jakafi, multiple vitamins-minerals, calcium ascorbate, aspirin, omega-3 fatty acids-vitamin e, lisinopril-hydrochlorothiazide, kombiglyze xr, primidone, and ra omeprazole.     ALLERGIES:  has No Known

## 2020-08-06 ENCOUNTER — TELEPHONE (OUTPATIENT)
Dept: ONCOLOGY | Age: 70
End: 2020-08-06

## 2020-08-06 NOTE — TELEPHONE ENCOUNTER
Patient called 8/3/2020 to report he was recently hospitalized at [address] for COVID, diarrhea, SERVANDO. His Jakafi was stopped during that hospitalization. He wanted to know if he should resume it. Discussed with Dr. Morales on 8/5/2020 and was instructed to have patient wait 2 weeks post discharge before restarting Jakafi. Informed patient and he verbalized understanding.

## 2020-08-12 ENCOUNTER — HOSPITAL ENCOUNTER (OUTPATIENT)
Age: 70
Discharge: HOME | End: 2020-08-12
Payer: COMMERCIAL

## 2020-08-12 LAB
ABSOLUTE EOS #: 0.12 K/UL (ref 0–0.44)
ABSOLUTE IMMATURE GRANULOCYTE: 1.3 K/UL (ref 0–0.3)
ABSOLUTE LYMPH #: 0.94 K/UL (ref 1.1–3.7)
ABSOLUTE MONO #: 0.83 K/UL (ref 0.1–1.2)
ALBUMIN SERPL-MCNC: 4.3 G/DL (ref 3.5–5.2)
ALBUMIN/GLOBULIN RATIO: 1.4 (ref 1–2.5)
ALP BLD-CCNC: 70 U/L (ref 40–129)
ALT SERPL-CCNC: 51 U/L (ref 5–41)
ANION GAP SERPL CALCULATED.3IONS-SCNC: 11 MMOL/L (ref 9–17)
AST SERPL-CCNC: 25 U/L
BASOPHILS # BLD: 0 % (ref 0–2)
BASOPHILS ABSOLUTE: 0 K/UL (ref 0–0.2)
BILIRUB SERPL-MCNC: 0.43 MG/DL (ref 0.3–1.2)
BUN BLDV-MCNC: 18 MG/DL (ref 8–23)
BUN/CREAT BLD: 17 (ref 9–20)
CALCIUM SERPL-MCNC: 9.2 MG/DL (ref 8.6–10.4)
CHLORIDE BLD-SCNC: 95 MMOL/L (ref 98–107)
CO2: 26 MMOL/L (ref 20–31)
CREAT SERPL-MCNC: 1.09 MG/DL (ref 0.7–1.2)
DIFFERENTIAL TYPE: ABNORMAL
EOSINOPHILS RELATIVE PERCENT: 1 % (ref 1–4)
FERRITIN: 585 UG/L (ref 30–400)
GFR AFRICAN AMERICAN: >60 ML/MIN
GFR NON-AFRICAN AMERICAN: >60 ML/MIN
GFR SERPL CREATININE-BSD FRML MDRD: ABNORMAL ML/MIN/{1.73_M2}
GFR SERPL CREATININE-BSD FRML MDRD: ABNORMAL ML/MIN/{1.73_M2}
GLUCOSE BLD-MCNC: 206 MG/DL (ref 70–99)
HCT VFR BLD CALC: 38.4 % (ref 40.7–50.3)
HEMOGLOBIN: 12.3 G/DL (ref 13–17)
IMMATURE GRANULOCYTES: 11 %
INR BLD: 1.6
LYMPHOCYTES # BLD: 8 % (ref 24–43)
MCH RBC QN AUTO: 29 PG (ref 25.2–33.5)
MCHC RBC AUTO-ENTMCNC: 32 G/DL (ref 28.4–34.8)
MCV RBC AUTO: 90.6 FL (ref 82.6–102.9)
MONOCYTES # BLD: 7 % (ref 3–12)
MORPHOLOGY: ABNORMAL
NRBC AUTOMATED: 0.3 PER 100 WBC
NUCLEATED RED BLOOD CELLS: 1 PER 100 WBC (ref 0–5)
PDW BLD-RTO: 17.2 % (ref 11.8–14.4)
PLATELET # BLD: 210 K/UL (ref 138–453)
PLATELET ESTIMATE: ABNORMAL
PMV BLD AUTO: 9.8 FL (ref 8.1–13.5)
POTASSIUM SERPL-SCNC: 4.5 MMOL/L (ref 3.7–5.3)
PROTHROMBIN TIME: 19.1 SEC (ref 11.5–14.2)
RBC # BLD: 4.24 M/UL (ref 4.21–5.77)
RBC # BLD: ABNORMAL 10*6/UL
SEG NEUTROPHILS: 73 % (ref 36–65)
SEGMENTED NEUTROPHILS ABSOLUTE COUNT: 8.61 K/UL (ref 1.5–8.1)
SODIUM BLD-SCNC: 132 MMOL/L (ref 135–144)
TOTAL PROTEIN: 7.3 G/DL (ref 6.4–8.3)
WBC # BLD: 11.8 K/UL (ref 3.5–11.3)
WBC # BLD: ABNORMAL 10*3/UL

## 2020-08-12 PROCEDURE — 82728 ASSAY OF FERRITIN: CPT

## 2020-08-12 PROCEDURE — 80053 COMPREHEN METABOLIC PANEL: CPT

## 2020-08-12 PROCEDURE — 36415 COLL VENOUS BLD VENIPUNCTURE: CPT

## 2020-08-12 PROCEDURE — 85610 PROTHROMBIN TIME: CPT

## 2020-08-12 PROCEDURE — 85025 COMPLETE CBC W/AUTO DIFF WBC: CPT

## 2020-08-21 ENCOUNTER — HOSPITAL ENCOUNTER (OUTPATIENT)
Age: 70
Discharge: HOME | End: 2020-08-21
Payer: COMMERCIAL

## 2020-08-21 LAB
ANION GAP SERPL CALCULATED.3IONS-SCNC: 11 MMOL/L (ref 9–17)
BUN BLDV-MCNC: 16 MG/DL (ref 8–23)
BUN/CREAT BLD: 17 (ref 9–20)
CALCIUM SERPL-MCNC: 9.4 MG/DL (ref 8.6–10.4)
CHLORIDE BLD-SCNC: 100 MMOL/L (ref 98–107)
CO2: 25 MMOL/L (ref 20–31)
CREAT SERPL-MCNC: 0.92 MG/DL (ref 0.7–1.2)
GFR AFRICAN AMERICAN: >60 ML/MIN
GFR NON-AFRICAN AMERICAN: >60 ML/MIN
GFR SERPL CREATININE-BSD FRML MDRD: ABNORMAL ML/MIN/{1.73_M2}
GFR SERPL CREATININE-BSD FRML MDRD: ABNORMAL ML/MIN/{1.73_M2}
GLUCOSE BLD-MCNC: 165 MG/DL (ref 70–99)
INR BLD: 1.8
POTASSIUM SERPL-SCNC: 4.3 MMOL/L (ref 3.7–5.3)
PROTHROMBIN TIME: 20.7 SEC (ref 11.5–14.2)
SODIUM BLD-SCNC: 136 MMOL/L (ref 135–144)

## 2020-08-21 PROCEDURE — 36415 COLL VENOUS BLD VENIPUNCTURE: CPT

## 2020-08-21 PROCEDURE — 85610 PROTHROMBIN TIME: CPT

## 2020-08-21 PROCEDURE — 80048 BASIC METABOLIC PNL TOTAL CA: CPT

## 2020-09-11 ENCOUNTER — HOSPITAL ENCOUNTER (OUTPATIENT)
Age: 70
Discharge: HOME | End: 2020-09-11
Payer: COMMERCIAL

## 2020-09-11 LAB
INR BLD: 2.8
PROTHROMBIN TIME: 29.7 SEC (ref 11.5–14.2)

## 2020-09-11 PROCEDURE — 36415 COLL VENOUS BLD VENIPUNCTURE: CPT

## 2020-09-11 PROCEDURE — 85610 PROTHROMBIN TIME: CPT

## 2020-09-16 ENCOUNTER — OFFICE VISIT (OUTPATIENT)
Dept: UROLOGY | Age: 70
End: 2020-09-16
Payer: COMMERCIAL

## 2020-09-16 ENCOUNTER — HOSPITAL ENCOUNTER (OUTPATIENT)
Age: 70
Setting detail: SPECIMEN
Discharge: HOME | End: 2020-09-16
Payer: COMMERCIAL

## 2020-09-16 VITALS
WEIGHT: 196.4 LBS | DIASTOLIC BLOOD PRESSURE: 70 MMHG | BODY MASS INDEX: 27.5 KG/M2 | RESPIRATION RATE: 20 BRPM | TEMPERATURE: 97.9 F | HEIGHT: 71 IN | SYSTOLIC BLOOD PRESSURE: 142 MMHG | HEART RATE: 68 BPM

## 2020-09-16 PROBLEM — N52.9 ERECTILE DYSFUNCTION: Status: ACTIVE | Noted: 2020-09-16

## 2020-09-16 PROBLEM — Z12.5 PROSTATE CANCER SCREENING: Status: ACTIVE | Noted: 2020-09-16

## 2020-09-16 PROBLEM — R31.29 MICROHEMATURIA: Status: ACTIVE | Noted: 2020-09-16

## 2020-09-16 LAB
-: NORMAL
AMORPHOUS: NORMAL
BACTERIA: NORMAL
BILIRUBIN URINE: NEGATIVE
CASTS UA: NORMAL /LPF
COLOR: YELLOW
COMMENT UA: NORMAL
CRYSTALS, UA: NORMAL /HPF
EPITHELIAL CELLS UA: NORMAL /HPF (ref 0–5)
GLUCOSE URINE: NEGATIVE
KETONES, URINE: NEGATIVE
LEUKOCYTE ESTERASE, URINE: NEGATIVE
MUCUS: NORMAL
NITRITE, URINE: NEGATIVE
OTHER OBSERVATIONS UA: NORMAL
PH UA: 6 (ref 5–9)
PROTEIN UA: NEGATIVE
RBC UA: NORMAL /HPF (ref 0–2)
RENAL EPITHELIAL, UA: NORMAL /HPF
SPECIFIC GRAVITY UA: 1.01 (ref 1.01–1.02)
TRICHOMONAS: NORMAL
TURBIDITY: CLEAR
URINE HGB: NEGATIVE
UROBILINOGEN, URINE: NORMAL
WBC UA: NORMAL /HPF (ref 0–5)
YEAST: NORMAL

## 2020-09-16 PROCEDURE — 3017F COLORECTAL CA SCREEN DOC REV: CPT | Performed by: UROLOGY

## 2020-09-16 PROCEDURE — 87086 URINE CULTURE/COLONY COUNT: CPT

## 2020-09-16 PROCEDURE — 1123F ACP DISCUSS/DSCN MKR DOCD: CPT | Performed by: UROLOGY

## 2020-09-16 PROCEDURE — G8417 CALC BMI ABV UP PARAM F/U: HCPCS | Performed by: UROLOGY

## 2020-09-16 PROCEDURE — G8427 DOCREV CUR MEDS BY ELIG CLIN: HCPCS | Performed by: UROLOGY

## 2020-09-16 PROCEDURE — 99204 OFFICE O/P NEW MOD 45 MIN: CPT | Performed by: UROLOGY

## 2020-09-16 PROCEDURE — 4040F PNEUMOC VAC/ADMIN/RCVD: CPT | Performed by: UROLOGY

## 2020-09-16 PROCEDURE — 1036F TOBACCO NON-USER: CPT | Performed by: UROLOGY

## 2020-09-16 PROCEDURE — 81001 URINALYSIS AUTO W/SCOPE: CPT

## 2020-09-16 RX ORDER — SILDENAFIL CITRATE 20 MG/1
60 TABLET ORAL PRN
Qty: 30 TABLET | Refills: 3 | Status: SHIPPED | OUTPATIENT
Start: 2020-09-16 | End: 2020-11-11

## 2020-09-16 ASSESSMENT — ENCOUNTER SYMPTOMS
COLOR CHANGE: 0
EYE REDNESS: 0
WHEEZING: 0
VOMITING: 0
NAUSEA: 0
EYE PAIN: 0
SHORTNESS OF BREATH: 0
BACK PAIN: 0
COUGH: 0
ABDOMINAL PAIN: 0

## 2020-09-16 NOTE — PROGRESS NOTES
HPI:    Patient is a 72 y.o. male in no acute distress. He is alert and oriented to person, place, and time. Patient being seen here today as a new patient. Patient was referred here by Dr. Karina Pardo for erectile dysfunction. Patient does state that he has tried 50 mg Viagra in the past.  This was not helpful. This was sometime ago and he is unsure if he took the medication appropriately. He reports no recent gross hematuria dysuria. Patient did have a work-up for gross hematuria. This was negative. Patient has had no recent gross hematuria. He has not seen urology for some time. He is unsure whether or not his PSA has been checked. Patient has no history of elevated PSA.     Past Medical History:   Diagnosis Date    Blood disorder     Diabetes mellitus ([facility] [address]     Hypertension      Past Surgical History:   Procedure Laterality Date    ROTATOR CUFF REPAIR Right 2014     Outpatient Encounter Medications as of 9/16/2020   Medication Sig Dispense Refill    tamsulosin (FLOMAX) 0.4 MG capsule take 1 capsule by mouth once daily 1/2 HOUR FOLLOWING THE SAME MEAL EACH DAY      warfarin (COUMADIN) 2 MG tablet daily Takes 2 tabs daily      warfarin (COUMADIN) 5 MG tablet take 1 tablet by mouth once daily      ruxolitinib phosphate (JAKAFI) 10 MG tablet TAKE 1 TABLET 2 TIMES A DAY 60 tablet 5    Multiple Vitamins-Minerals (MULTIVITAMIN ADULT PO) Take 1 tablet by mouth      VITAMIN C, CALCIUM ASCORBATE, PO Take by mouth      aspirin 81 MG tablet Take 81 mg by mouth      lisinopril-hydrochlorothiazide (PRINZIDE;ZESTORETIC) 20-25 MG per tablet   0    KOMBIGLYZE XR 2.5-1000 MG TB24   0    primidone (MYSOLINE) 50 MG tablet   0    RA OMEPRAZOLE 20 MG EC tablet take 1 tablet by mouth twice a day  0    ferrous sulfate (IRON 325) 325 (65 Fe) MG tablet Take 1 tablet by mouth daily 30 tablet 1    OMEGA-3 FATTY ACIDS-VITAMIN E PO Take 1 capsule by mouth       No facility-administered encounter medications on file as of 2020. Current Outpatient Medications on File Prior to Visit   Medication Sig Dispense Refill    tamsulosin (FLOMAX) 0.4 MG capsule take 1 capsule by mouth once daily 1/2 HOUR FOLLOWING THE SAME MEAL EACH DAY      warfarin (COUMADIN) 2 MG tablet daily Takes 2 tabs daily      warfarin (COUMADIN) 5 MG tablet take 1 tablet by mouth once daily      ruxolitinib phosphate (JAKAFI) 10 MG tablet TAKE 1 TABLET 2 TIMES A DAY 60 tablet 5    Multiple Vitamins-Minerals (MULTIVITAMIN ADULT PO) Take 1 tablet by mouth      VITAMIN C, CALCIUM ASCORBATE, PO Take by mouth      aspirin 81 MG tablet Take 81 mg by mouth      lisinopril-hydrochlorothiazide (PRINZIDE;ZESTORETIC) 20-25 MG per tablet   0    KOMBIGLYZE XR 2.5-1000 MG TB24   0    primidone (MYSOLINE) 50 MG tablet   0    RA OMEPRAZOLE 20 MG EC tablet take 1 tablet by mouth twice a day  0    ferrous sulfate (IRON 325) 325 (65 Fe) MG tablet Take 1 tablet by mouth daily 30 tablet 1    OMEGA-3 FATTY ACIDS-VITAMIN E PO Take 1 capsule by mouth       No current facility-administered medications on file prior to visit. Patient has no known allergies. History reviewed. No pertinent family history. Social History     Tobacco Use   Smoking Status Former Smoker    Packs/day: 0.50    Years: 15.00    Pack years: 7.50    Last attempt to quit: 1981    Years since quittin.7   Smokeless Tobacco Never Used   Tobacco Comment    quit smoking 38 years ago       Social History     Substance and Sexual Activity   Alcohol Use Not Currently    Comment: Quit drinking       Review of Systems   Constitutional: Negative for appetite change, chills and fever. Eyes: Negative for pain, redness and visual disturbance. Respiratory: Negative for cough, shortness of breath and wheezing. Cardiovascular: Negative for chest pain and leg swelling. Gastrointestinal: Negative for abdominal pain, nausea and vomiting.    Genitourinary: Negative for difficulty urinating, discharge, dysuria, flank pain, frequency, hematuria, scrotal swelling and testicular pain. Musculoskeletal: Negative for back pain, joint swelling and myalgias. Skin: Negative for color change, rash and wound. Neurological: Negative for dizziness, tremors and numbness. Hematological: Negative for adenopathy. Does not bruise/bleed easily. There were no vitals taken for this visit. PHYSICAL EXAM:  Constitutional: Patient in no acute distress; Neuro: alert and oriented to person place and time. Psych: Mood and affect normal.  Skin: Normal  Lungs: Respiratory effort normal  Cardiovascular:  Normal peripheral pulses  Abdomen: Soft, non-tender, non-distended with no CVA, flank pain, hepatosplenomegaly or hernia. Kidneys normal.  Bladder non-tender and not distended. Lymphatics: no palpable lymphadenopathy  Penis normal  Urethral meatus normal  Scrotal exam normal  Testicles normal bilaterally  Epididymis normal bilaterally  No evidence of inguinal hernia      Lab Results   Component Value Date    BUN 16 08/21/2020     Lab Results   Component Value Date    CREATININE 0.92 08/21/2020     No results found for: PSA    ASSESSMENT:  This is a 72 y.o. male with the following diagnoses:   Diagnosis Orders   1. Prostate cancer screening  Psa screening   2. Erectile dysfunction, unspecified erectile dysfunction type  sildenafil (REVATIO) 20 MG tablet   3. Microhematuria  Urinalysis with Microscopic    Culture, Urine         PLAN:  We did start him on Revatio today he will start off with 3 tablets. He will follow-up with in approximately 8 weeks. At that point time we will also recheck a screening PSA. We will also check a urine for culture and sensitivity today.

## 2020-09-17 LAB
CULTURE: NO GROWTH
Lab: NORMAL
SPECIMEN DESCRIPTION: NORMAL

## 2020-09-18 ENCOUNTER — HOSPITAL ENCOUNTER (OUTPATIENT)
Age: 70
Discharge: HOME | End: 2020-09-18
Payer: COMMERCIAL

## 2020-09-18 ENCOUNTER — TELEPHONE (OUTPATIENT)
Dept: UROLOGY | Age: 70
End: 2020-09-18

## 2020-09-18 LAB — PROSTATE SPECIFIC ANTIGEN: 2.61 UG/L

## 2020-09-18 PROCEDURE — 36415 COLL VENOUS BLD VENIPUNCTURE: CPT

## 2020-09-18 PROCEDURE — G0103 PSA SCREENING: HCPCS

## 2020-09-30 RX ORDER — RUXOLITINIB 10 MG/1
TABLET ORAL
Qty: 60 TABLET | Refills: 1 | Status: SHIPPED | OUTPATIENT
Start: 2020-09-30 | End: 2020-10-21 | Stop reason: SDUPTHER

## 2020-10-16 PROBLEM — Z12.5 PROSTATE CANCER SCREENING: Status: RESOLVED | Noted: 2020-09-16 | Resolved: 2020-10-16

## 2020-10-19 ENCOUNTER — HOSPITAL ENCOUNTER (OUTPATIENT)
Age: 70
Discharge: HOME | End: 2020-10-19
Payer: COMMERCIAL

## 2020-10-19 LAB
ABSOLUTE EOS #: 0.08 K/UL (ref 0–0.44)
ABSOLUTE IMMATURE GRANULOCYTE: 0.46 K/UL (ref 0–0.3)
ABSOLUTE LYMPH #: 1.78 K/UL (ref 1.1–3.7)
ABSOLUTE MONO #: 0.54 K/UL (ref 0.1–1.2)
ALBUMIN SERPL-MCNC: 4.9 G/DL (ref 3.5–5.2)
ALBUMIN/GLOBULIN RATIO: 2.1 (ref 1–2.5)
ALP BLD-CCNC: 70 U/L (ref 40–129)
ALT SERPL-CCNC: 23 U/L (ref 5–41)
ANION GAP SERPL CALCULATED.3IONS-SCNC: 12 MMOL/L (ref 9–17)
AST SERPL-CCNC: 21 U/L
BASOPHILS # BLD: 0 % (ref 0–2)
BASOPHILS ABSOLUTE: 0 K/UL (ref 0–0.2)
BILIRUB SERPL-MCNC: 0.55 MG/DL (ref 0.3–1.2)
BUN BLDV-MCNC: 21 MG/DL (ref 8–23)
BUN/CREAT BLD: 23 (ref 9–20)
CALCIUM SERPL-MCNC: 9.5 MG/DL (ref 8.6–10.4)
CHLORIDE BLD-SCNC: 93 MMOL/L (ref 98–107)
CO2: 26 MMOL/L (ref 20–31)
CREAT SERPL-MCNC: 0.93 MG/DL (ref 0.7–1.2)
DIFFERENTIAL TYPE: ABNORMAL
EOSINOPHILS RELATIVE PERCENT: 1 % (ref 1–4)
GFR AFRICAN AMERICAN: >60 ML/MIN
GFR NON-AFRICAN AMERICAN: >60 ML/MIN
GFR SERPL CREATININE-BSD FRML MDRD: ABNORMAL ML/MIN/{1.73_M2}
GFR SERPL CREATININE-BSD FRML MDRD: ABNORMAL ML/MIN/{1.73_M2}
GLUCOSE BLD-MCNC: 340 MG/DL (ref 70–99)
HCT VFR BLD CALC: 39.2 % (ref 40.7–50.3)
HEMOGLOBIN: 12.9 G/DL (ref 13–17)
IMMATURE GRANULOCYTES: 6 %
LYMPHOCYTES # BLD: 23 % (ref 24–43)
MCH RBC QN AUTO: 29.1 PG (ref 25.2–33.5)
MCHC RBC AUTO-ENTMCNC: 32.9 G/DL (ref 28.4–34.8)
MCV RBC AUTO: 88.3 FL (ref 82.6–102.9)
MONOCYTES # BLD: 7 % (ref 3–12)
MORPHOLOGY: ABNORMAL
NRBC AUTOMATED: 1.4 PER 100 WBC
NUCLEATED RED BLOOD CELLS: 1 PER 100 WBC (ref 0–5)
PDW BLD-RTO: 16.4 % (ref 11.8–14.4)
PLATELET # BLD: ABNORMAL K/UL (ref 138–453)
PLATELET ESTIMATE: ABNORMAL
PLATELET, FLUORESCENCE: 149 K/UL (ref 138–453)
PLATELET, IMMATURE FRACTION: 6.4 % (ref 1.1–10.3)
PMV BLD AUTO: ABNORMAL FL (ref 8.1–13.5)
POTASSIUM SERPL-SCNC: 4.5 MMOL/L (ref 3.7–5.3)
RBC # BLD: 4.44 M/UL (ref 4.21–5.77)
RBC # BLD: ABNORMAL 10*6/UL
SEG NEUTROPHILS: 63 % (ref 36–65)
SEGMENTED NEUTROPHILS ABSOLUTE COUNT: 4.86 K/UL (ref 1.5–8.1)
SODIUM BLD-SCNC: 131 MMOL/L (ref 135–144)
TOTAL PROTEIN: 7.2 G/DL (ref 6.4–8.3)
WBC # BLD: 7.7 K/UL (ref 3.5–11.3)
WBC # BLD: ABNORMAL 10*3/UL

## 2020-10-19 PROCEDURE — 85025 COMPLETE CBC W/AUTO DIFF WBC: CPT

## 2020-10-19 PROCEDURE — 85055 RETICULATED PLATELET ASSAY: CPT

## 2020-10-19 PROCEDURE — 36415 COLL VENOUS BLD VENIPUNCTURE: CPT

## 2020-10-19 PROCEDURE — 80053 COMPREHEN METABOLIC PANEL: CPT

## 2020-10-21 ENCOUNTER — OFFICE VISIT (OUTPATIENT)
Dept: ONCOLOGY | Age: 70
End: 2020-10-21
Payer: COMMERCIAL

## 2020-10-21 VITALS
TEMPERATURE: 97.3 F | DIASTOLIC BLOOD PRESSURE: 73 MMHG | HEART RATE: 78 BPM | BODY MASS INDEX: 27.34 KG/M2 | RESPIRATION RATE: 18 BRPM | WEIGHT: 196 LBS | SYSTOLIC BLOOD PRESSURE: 137 MMHG

## 2020-10-21 PROCEDURE — 1123F ACP DISCUSS/DSCN MKR DOCD: CPT | Performed by: INTERNAL MEDICINE

## 2020-10-21 PROCEDURE — 99214 OFFICE O/P EST MOD 30 MIN: CPT | Performed by: INTERNAL MEDICINE

## 2020-10-21 PROCEDURE — G8427 DOCREV CUR MEDS BY ELIG CLIN: HCPCS | Performed by: INTERNAL MEDICINE

## 2020-10-21 PROCEDURE — 1036F TOBACCO NON-USER: CPT | Performed by: INTERNAL MEDICINE

## 2020-10-21 PROCEDURE — 3017F COLORECTAL CA SCREEN DOC REV: CPT | Performed by: INTERNAL MEDICINE

## 2020-10-21 PROCEDURE — G8417 CALC BMI ABV UP PARAM F/U: HCPCS | Performed by: INTERNAL MEDICINE

## 2020-10-21 PROCEDURE — G8484 FLU IMMUNIZE NO ADMIN: HCPCS | Performed by: INTERNAL MEDICINE

## 2020-10-21 PROCEDURE — 4040F PNEUMOC VAC/ADMIN/RCVD: CPT | Performed by: INTERNAL MEDICINE

## 2020-10-21 RX ORDER — RUXOLITINIB 10 MG/1
TABLET ORAL
Qty: 60 TABLET | Refills: 5 | Status: SHIPPED | OUTPATIENT
Start: 2020-10-21 | End: 2021-05-18 | Stop reason: SDUPTHER

## 2020-10-21 NOTE — PROGRESS NOTES
DIAGNOSIS:   1. History of myeloproliferative disorder , Malcom 2+, likely polycythemia diagnosed in 2016  2. Factor V Leiden mutation and lupus anticoagulant  3. Spent phase polycythemia/myelofibrosis transformation diagnosed in 2019  CURRENT THERAPY:  1. Started hydroxyurea and aspirin  2. Upon diagnosis of myelofibrosis, transition to   3. Long-term anticoagulation with Coumadin  BRIEF CASE HISTORY:   Sadi Mendiola is a very pleasant 72 y.o. male who was followed by different hematologist, he was diagnosed with myeloproliferative syndrome likely polycythemia vera where he presented with polycythemia and thrombocytosis and he was Malcom 2+. He was started on hydroxyurea for multiple years. In 2019, he started having worsening cytopenias and bone marrow aspiration biopsy showed hypercellular marrow but increased megakaryocyte and increased fibrotic changes suggestive of spent phase polycythemia/myelofibrosis. He was started on Jakafi. The patient also has a history of hypercoagulable condition namely factor V Leiden and lupus anticoagulant and he is on long-term anticoagulation. INTERIM HISTORY:  The patient comes in today for a follow up, feeling well and doing well. Had some skin lesions on his face that required dermatological intervention. He had liquid nitrogen and also needing microsurgery later this month. Overall he is doing very well otherwise without any issues. He continues to follow with the Coumadin clinic and INR has been therapeutic. PAST MEDICAL HISTORY: has a past medical history of Blood disorder, Diabetes mellitus ([address] and Hypertension. PAST SURGICAL HISTORY: has a past surgical history that includes Rotator cuff repair (Right, 2014).      CURRENT MEDICATIONS:  has a current medication list which includes the following prescription(s): jakafi, sildenafil, tamsulosin, warfarin, warfarin, multiple vitamins-minerals, calcium ascorbate, aspirin, omega-3 fatty acids-vitamin e, lisinopril-hydrochlorothiazide, kombiglyze xr, primidone, ra omeprazole, and ferrous sulfate. ALLERGIES:  has No Known Allergies. FAMILY HISTORY: Negative for any hematological or oncological conditions. SOCIAL HISTORY:  reports that he quit smoking about 39 years ago. He has a 7.50 pack-year smoking history. He has never used smokeless tobacco. He reports previous alcohol use. He reports that he does not use drugs. REVIEW OF SYSTEMS:  General: no fever or night sweats, Weight is stable. ENT: No double or blurred vision, no tinnitus or hearing problem, no dysphagia or sore throat   Respiratory: No chest pain, no shortness of breath, no cough or hemoptysis. Cardiovascular: Denies chest pain, PND or orthopnea. No L E swelling or palpitations. Gastrointestinal:    No nausea or vomiting, abdominal pain, diarrhea or constipation. Genitourinary: Denies dysuria, hematuria, frequency, urgency or incontinence. Neurological: Denies headaches, decreased LOC, no sensory or motor focal deficits. Musculoskeletal:  No arthralgia no back pain or joint swelling. Skin: There are no rashes or bleeding. Easy bruisability secondary to anticoagulation  Psychiatric:  No anxiety, no depression. Endocrine: no diabetes or thyroid disease. Hematologic: no bleeding , no adenopathy. PHYSICAL EXAM: Shows a well appearing 72y.o.-year-old male who is not in pain or distress. Vital Signs: Blood pressure 137/73, pulse 78, temperature 97.3 °F (36.3 °C), temperature source Temporal, resp. rate 18, weight 196 lb (88.9 kg). HEENT: Normocephalic and atraumatic. Pupils are equal, round, reactive to light and accommodation. Extraocular muscles are intact. Neck: Showed no JVD, no carotid bruit . Lungs: Clear to auscultation bilaterally. Heart: Regular , systolic murmur unchanged abdomen: Soft, nontender. Enlarged spleen, palpable at anterior axillary line.   It seems smaller compared to my previous exam extremities: Lower extremities show no edema, clubbing, or cyanosis. Breasts: Examination not done today. Neuro exam: intact cranial nerves bilaterally no motor or sensory deficit, gait is normal. Lymphatic: no adenopathy appreciated in the supraclavicular, axillary, cervical or inguinal area    Review of radiological studies:     Review of laboratory data:   Lab Results   Component Value Date    WBC 7.7 10/19/2020    HGB 12.9 (L) 10/19/2020    HCT 39.2 (L) 10/19/2020    MCV 88.3 10/19/2020    PLT See Reflexed IPF Result 10/19/2020       Chemistry        Component Value Date/Time     (L) 10/19/2020 1002    K 4.5 10/19/2020 1002    CL 93 (L) 10/19/2020 1002    CO2 26 10/19/2020 1002    BUN 21 10/19/2020 1002    CREATININE 0.93 10/19/2020 1002        Component Value Date/Time    CALCIUM 9.5 10/19/2020 1002    ALKPHOS 70 10/19/2020 1002    AST 21 10/19/2020 1002    ALT 23 10/19/2020 1002    BILITOT 0.55 10/19/2020 1002          IMPRESSION:   1. History of polycythemia  2. Spent phase  polycythemia/myelofibrosis  3. Hypercoagulable condition factor V Leiden and lupus anticoagulant  Plan:   Patient is doing very well and his hemoglobin and platelets are stable with Jakafi. Clinically is doing very well he does not have any constitutional symptoms.   He is doing very well with the Coumadin and INR has been therapeutic  We will continue current therapy without any changes, I will see him back in 6 months for follow-up

## 2020-11-07 ENCOUNTER — HOSPITAL ENCOUNTER (OUTPATIENT)
Age: 70
Discharge: HOME | End: 2020-11-07
Payer: COMMERCIAL

## 2020-11-07 LAB
INR BLD: 1.8
PROTHROMBIN TIME: 20.8 SEC (ref 11.5–14.2)

## 2020-11-07 PROCEDURE — 36415 COLL VENOUS BLD VENIPUNCTURE: CPT

## 2020-11-07 PROCEDURE — 85610 PROTHROMBIN TIME: CPT

## 2020-11-11 ENCOUNTER — OFFICE VISIT (OUTPATIENT)
Dept: UROLOGY | Age: 70
End: 2020-11-11
Payer: COMMERCIAL

## 2020-11-11 VITALS — WEIGHT: 199 LBS | BODY MASS INDEX: 27.75 KG/M2 | DIASTOLIC BLOOD PRESSURE: 58 MMHG | SYSTOLIC BLOOD PRESSURE: 122 MMHG

## 2020-11-11 PROCEDURE — 1123F ACP DISCUSS/DSCN MKR DOCD: CPT | Performed by: PHYSICIAN ASSISTANT

## 2020-11-11 PROCEDURE — G8484 FLU IMMUNIZE NO ADMIN: HCPCS | Performed by: PHYSICIAN ASSISTANT

## 2020-11-11 PROCEDURE — 99213 OFFICE O/P EST LOW 20 MIN: CPT | Performed by: PHYSICIAN ASSISTANT

## 2020-11-11 PROCEDURE — 4040F PNEUMOC VAC/ADMIN/RCVD: CPT | Performed by: PHYSICIAN ASSISTANT

## 2020-11-11 PROCEDURE — G8417 CALC BMI ABV UP PARAM F/U: HCPCS | Performed by: PHYSICIAN ASSISTANT

## 2020-11-11 PROCEDURE — G8427 DOCREV CUR MEDS BY ELIG CLIN: HCPCS | Performed by: PHYSICIAN ASSISTANT

## 2020-11-11 PROCEDURE — 3017F COLORECTAL CA SCREEN DOC REV: CPT | Performed by: PHYSICIAN ASSISTANT

## 2020-11-11 PROCEDURE — 1036F TOBACCO NON-USER: CPT | Performed by: PHYSICIAN ASSISTANT

## 2020-11-11 NOTE — PROGRESS NOTES
HPI:      Patient is a 72 y.o. male in no acute distress. He is alert and oriented to person, place, and time. History:  Patient being seen here today as a new patient. Patient was referred here by Dr. Charo Arnett for erectile dysfunction. Patient does state that he has tried 50 mg Viagra in the past.  This was not helpful. This was sometime ago and he is unsure if he took the medication appropriately. He reports no recent gross hematuria dysuria. Patient did have a work-up for gross hematuria. This was negative. Patient has had no recent gross hematuria. He has not seen urology for some time. He is unsure whether or not his PSA has been checked. Patient has no history of elevated PSA. Revatio not effective    Offered Trimix patient declined    PSA:  9/2020 - 2.61    Today:  Patient is here today for follow-up erectile dysfunction and screening PSA. He did have a PSA prior to visit which was 2.61. At last visit he was placed on Revatio. He states that he has taken 5 tablets of Revatio and was not successful. He states that he has taken them at least 1/2 to 2 hours after eating and has not had any improvement in his erections. He has tried both masturbation and also having his wife involved with no improvement. He states that his wife is not interested in sexual activity. He does take Flomax every day per his primary care. He is happy with his urinary symptoms.     Past Medical History:   Diagnosis Date    Blood disorder     Diabetes mellitus ([address]     Hypertension      Past Surgical History:   Procedure Laterality Date    ROTATOR CUFF REPAIR Right 2014     Outpatient Encounter Medications as of 11/11/2020   Medication Sig Dispense Refill    ruxolitinib phosphate (JAKAFI) 10 MG tablet TAKE 1 TABLET 2 TIMES A DAY 60 tablet 5    sildenafil (REVATIO) 20 MG tablet Take 3 tablets by mouth as needed (erectile dysfunction) 30 tablet 3    tamsulosin (FLOMAX) 0.4 MG capsule take 1 capsule by mouth once daily 1/2 HOUR FOLLOWING THE SAME MEAL EACH DAY      warfarin (COUMADIN) 2 MG tablet daily Takes 2 tabs daily      warfarin (COUMADIN) 5 MG tablet take 1 tablet by mouth once daily      Multiple Vitamins-Minerals (MULTIVITAMIN ADULT PO) Take 1 tablet by mouth      VITAMIN C, CALCIUM ASCORBATE, PO Take by mouth      aspirin 81 MG tablet Take 81 mg by mouth      OMEGA-3 FATTY ACIDS-VITAMIN E PO Take 1 capsule by mouth      lisinopril-hydrochlorothiazide (PRINZIDE;ZESTORETIC) 20-25 MG per tablet   0    KOMBIGLYZE XR 2.5-1000 MG TB24   0    primidone (MYSOLINE) 50 MG tablet   0    RA OMEPRAZOLE 20 MG EC tablet take 1 tablet by mouth twice a day  0    ferrous sulfate (IRON 325) 325 (65 Fe) MG tablet Take 1 tablet by mouth daily 30 tablet 1     No facility-administered encounter medications on file as of 11/11/2020.        Current Outpatient Medications on File Prior to Visit   Medication Sig Dispense Refill    ruxolitinib phosphate (JAKAFI) 10 MG tablet TAKE 1 TABLET 2 TIMES A DAY 60 tablet 5    sildenafil (REVATIO) 20 MG tablet Take 3 tablets by mouth as needed (erectile dysfunction) 30 tablet 3    tamsulosin (FLOMAX) 0.4 MG capsule take 1 capsule by mouth once daily 1/2 HOUR FOLLOWING THE SAME MEAL EACH DAY      warfarin (COUMADIN) 2 MG tablet daily Takes 2 tabs daily      warfarin (COUMADIN) 5 MG tablet take 1 tablet by mouth once daily      Multiple Vitamins-Minerals (MULTIVITAMIN ADULT PO) Take 1 tablet by mouth      VITAMIN C, CALCIUM ASCORBATE, PO Take by mouth      aspirin 81 MG tablet Take 81 mg by mouth      OMEGA-3 FATTY ACIDS-VITAMIN E PO Take 1 capsule by mouth      lisinopril-hydrochlorothiazide (PRINZIDE;ZESTORETIC) 20-25 MG per tablet   0    KOMBIGLYZE XR 2.5-1000 MG TB24   0    primidone (MYSOLINE) 50 MG tablet   0    RA OMEPRAZOLE 20 MG EC tablet take 1 tablet by mouth twice a day  0    ferrous sulfate (IRON 325) 325 (65 Fe) MG tablet Take 1 tablet by mouth daily 30 tablet 1 No current facility-administered medications on file prior to visit. Patient has no known allergies. History reviewed. No pertinent family history. Social History     Tobacco Use   Smoking Status Former Smoker    Packs/day: 0.50    Years: 15.00    Pack years: 7.50    Last attempt to quit: 1981    Years since quittin.8   Smokeless Tobacco Never Used   Tobacco Comment    quit smoking 38 years ago       Social History     Substance and Sexual Activity   Alcohol Use Not Currently    Comment: Quit drinking       Review of Systems    BP (!) 122/58 (Site: Right Upper Arm, Position: Sitting, Cuff Size: Large Adult)   Wt 199 lb (90.3 kg)   BMI 27.75 kg/m²       PHYSICAL EXAM:  Constitutional: Patient in no acute distress; Neuro: alert and oriented to person place and time. Psych: Mood and affect normal.  Skin: Normal  Lungs: Respiratory effort normal  Cardiovascular:  Normal peripheral pulses  Abdomen: Soft, non-tender, non-distended with no CVA, flank pain, hepatosplenomegaly or hernia. Kidneys normal.  Bladder non-tender and not distended. Lymphatics: no palpable lymphadenopathy  Rectal: Deferred      Lab Results   Component Value Date    BUN 21 10/19/2020     Lab Results   Component Value Date    CREATININE 0.93 10/19/2020     Lab Results   Component Value Date    PSA 2.61 2020       ASSESSMENT:   Diagnosis Orders   1. Prostate cancer screening  Psa screening   2. Erectile dysfunction, unspecified erectile dysfunction type       PLAN:  We discussed the various erectile dysfunction (ED) treatment options includin. Oral medical therapy  2. Vacuum constriction device  3. Intracorporeal injection therapy  4. Penile prosthesis surgical implantation    Patient has decided not to pursue Trimix or any other intervention at this time. If he does decide to use Trimix we did discuss the risks and benefits of this medication and we would be happy to prescribe it.   He is aware that after prescribing it he would need follow-up in the office in approximately 6 weeks after medication given.     Follow-up in 1 year with PSA

## 2020-12-29 ENCOUNTER — HOSPITAL ENCOUNTER (OUTPATIENT)
Age: 70
Discharge: HOME | End: 2020-12-29
Payer: COMMERCIAL

## 2020-12-29 LAB
ABSOLUTE EOS #: 0.27 K/UL (ref 0–0.44)
ABSOLUTE IMMATURE GRANULOCYTE: 0 K/UL (ref 0–0.3)
ABSOLUTE LYMPH #: 1.5 K/UL (ref 1.1–3.7)
ABSOLUTE MONO #: 0.41 K/UL (ref 0.1–1.2)
ALBUMIN SERPL-MCNC: 4.7 G/DL (ref 3.5–5.2)
ALBUMIN/GLOBULIN RATIO: 1.9 (ref 1–2.5)
ALP BLD-CCNC: 67 U/L (ref 40–129)
ALT SERPL-CCNC: 24 U/L (ref 5–41)
ANION GAP SERPL CALCULATED.3IONS-SCNC: 14 MMOL/L (ref 9–17)
AST SERPL-CCNC: 24 U/L
ATYPICAL LYMPHOCYTE ABSOLUTE COUNT: 0.14 K/UL
ATYPICAL LYMPHOCYTES: 2 %
BASOPHILS # BLD: 3 % (ref 0–2)
BASOPHILS ABSOLUTE: 0.2 K/UL (ref 0–0.2)
BILIRUB SERPL-MCNC: 0.49 MG/DL (ref 0.3–1.2)
BUN BLDV-MCNC: 27 MG/DL (ref 8–23)
BUN/CREAT BLD: 23 (ref 9–20)
CALCIUM SERPL-MCNC: 9.8 MG/DL (ref 8.6–10.4)
CHLORIDE BLD-SCNC: 95 MMOL/L (ref 98–107)
CO2: 23 MMOL/L (ref 20–31)
CREAT SERPL-MCNC: 1.17 MG/DL (ref 0.7–1.2)
DIFFERENTIAL TYPE: ABNORMAL
EOSINOPHILS RELATIVE PERCENT: 4 % (ref 1–4)
FERRITIN: 130 UG/L (ref 30–400)
GFR AFRICAN AMERICAN: >60 ML/MIN
GFR NON-AFRICAN AMERICAN: >60 ML/MIN
GFR SERPL CREATININE-BSD FRML MDRD: ABNORMAL ML/MIN/{1.73_M2}
GFR SERPL CREATININE-BSD FRML MDRD: ABNORMAL ML/MIN/{1.73_M2}
GLUCOSE BLD-MCNC: 356 MG/DL (ref 70–99)
HCT VFR BLD CALC: 42.6 % (ref 40.7–50.3)
HEMOGLOBIN: 14 G/DL (ref 13–17)
IMMATURE GRANULOCYTES: 0 %
INR BLD: 1.7
LYMPHOCYTES # BLD: 22 % (ref 24–43)
MCH RBC QN AUTO: 28.5 PG (ref 25.2–33.5)
MCHC RBC AUTO-ENTMCNC: 32.9 G/DL (ref 28.4–34.8)
MCV RBC AUTO: 86.8 FL (ref 82.6–102.9)
MONOCYTES # BLD: 6 % (ref 3–12)
MORPHOLOGY: NORMAL
NRBC AUTOMATED: 1.8 PER 100 WBC
PDW BLD-RTO: 17.2 % (ref 11.8–14.4)
PLATELET # BLD: ABNORMAL K/UL (ref 138–453)
PLATELET ESTIMATE: ABNORMAL
PLATELET, FLUORESCENCE: 121 K/UL (ref 138–453)
PLATELET, IMMATURE FRACTION: 6.7 % (ref 1.1–10.3)
PMV BLD AUTO: ABNORMAL FL (ref 8.1–13.5)
POTASSIUM SERPL-SCNC: 4.5 MMOL/L (ref 3.7–5.3)
PROTHROMBIN TIME: 20.1 SEC (ref 11.5–14.2)
RBC # BLD: 4.91 M/UL (ref 4.21–5.77)
RBC # BLD: ABNORMAL 10*6/UL
SEG NEUTROPHILS: 63 % (ref 36–65)
SEGMENTED NEUTROPHILS ABSOLUTE COUNT: 4.28 K/UL (ref 1.5–8.1)
SODIUM BLD-SCNC: 132 MMOL/L (ref 135–144)
TOTAL PROTEIN: 7.2 G/DL (ref 6.4–8.3)
WBC # BLD: 6.8 K/UL (ref 3.5–11.3)
WBC # BLD: ABNORMAL 10*3/UL

## 2020-12-29 PROCEDURE — 85055 RETICULATED PLATELET ASSAY: CPT

## 2020-12-29 PROCEDURE — 85025 COMPLETE CBC W/AUTO DIFF WBC: CPT

## 2020-12-29 PROCEDURE — 80053 COMPREHEN METABOLIC PANEL: CPT

## 2020-12-29 PROCEDURE — 82728 ASSAY OF FERRITIN: CPT

## 2020-12-29 PROCEDURE — 85610 PROTHROMBIN TIME: CPT

## 2020-12-29 PROCEDURE — 36415 COLL VENOUS BLD VENIPUNCTURE: CPT

## 2021-03-22 ENCOUNTER — HOSPITAL ENCOUNTER (OUTPATIENT)
Age: 71
Discharge: HOME | End: 2021-03-22
Payer: COMMERCIAL

## 2021-03-22 DIAGNOSIS — D68.59 HYPERCOAGULABLE STATE (HCC): ICD-10-CM

## 2021-03-22 DIAGNOSIS — D45 POLYCYTHEMIA RUBRA VERA (HCC): ICD-10-CM

## 2021-03-22 DIAGNOSIS — D47.1 MYELOPROLIFERATIVE DISORDER (HCC): ICD-10-CM

## 2021-03-22 LAB
ABSOLUTE EOS #: 0.06 K/UL (ref 0–0.44)
ABSOLUTE IMMATURE GRANULOCYTE: 0.25 K/UL (ref 0–0.3)
ABSOLUTE LYMPH #: 1.3 K/UL (ref 1.1–3.7)
ABSOLUTE MONO #: 0.5 K/UL (ref 0.1–1.2)
ALBUMIN SERPL-MCNC: 4.7 G/DL (ref 3.5–5.2)
ALBUMIN/GLOBULIN RATIO: 2 (ref 1–2.5)
ALP BLD-CCNC: 76 U/L (ref 40–129)
ALT SERPL-CCNC: 26 U/L (ref 5–41)
ANION GAP SERPL CALCULATED.3IONS-SCNC: 9 MMOL/L (ref 9–17)
AST SERPL-CCNC: 23 U/L
BASOPHILS # BLD: 0 % (ref 0–2)
BASOPHILS ABSOLUTE: 0 K/UL (ref 0–0.2)
BILIRUB SERPL-MCNC: 0.45 MG/DL (ref 0.3–1.2)
BUN BLDV-MCNC: 14 MG/DL (ref 8–23)
BUN/CREAT BLD: 14 (ref 9–20)
CALCIUM SERPL-MCNC: 9.7 MG/DL (ref 8.6–10.4)
CHLORIDE BLD-SCNC: 92 MMOL/L (ref 98–107)
CO2: 28 MMOL/L (ref 20–31)
CREAT SERPL-MCNC: 0.99 MG/DL (ref 0.7–1.2)
DIFFERENTIAL TYPE: ABNORMAL
EOSINOPHILS RELATIVE PERCENT: 1 % (ref 1–4)
FERRITIN: 166 UG/L (ref 30–400)
GFR AFRICAN AMERICAN: >60 ML/MIN
GFR NON-AFRICAN AMERICAN: >60 ML/MIN
GFR SERPL CREATININE-BSD FRML MDRD: ABNORMAL ML/MIN/{1.73_M2}
GFR SERPL CREATININE-BSD FRML MDRD: ABNORMAL ML/MIN/{1.73_M2}
GLUCOSE BLD-MCNC: 269 MG/DL (ref 70–99)
HCT VFR BLD CALC: 41.7 % (ref 40.7–50.3)
HEMOGLOBIN: 14.1 G/DL (ref 13–17)
IMMATURE GRANULOCYTES: 4 %
INR BLD: 2.4
LYMPHOCYTES # BLD: 21 % (ref 24–43)
MCH RBC QN AUTO: 28.5 PG (ref 25.2–33.5)
MCHC RBC AUTO-ENTMCNC: 33.8 G/DL (ref 28.4–34.8)
MCV RBC AUTO: 84.4 FL (ref 82.6–102.9)
MONOCYTES # BLD: 8 % (ref 3–12)
MORPHOLOGY: NORMAL
NRBC AUTOMATED: 1.1 PER 100 WBC
PDW BLD-RTO: 16.5 % (ref 11.8–14.4)
PLATELET # BLD: ABNORMAL K/UL (ref 138–453)
PLATELET ESTIMATE: ABNORMAL
PLATELET, FLUORESCENCE: 108 K/UL (ref 138–453)
PLATELET, IMMATURE FRACTION: 7.1 % (ref 1.1–10.3)
PMV BLD AUTO: ABNORMAL FL (ref 8.1–13.5)
POTASSIUM SERPL-SCNC: 4.6 MMOL/L (ref 3.7–5.3)
PROTHROMBIN TIME: 25.7 SEC (ref 11.5–14.2)
RBC # BLD: 4.94 M/UL (ref 4.21–5.77)
RBC # BLD: ABNORMAL 10*6/UL
SEG NEUTROPHILS: 66 % (ref 36–65)
SEGMENTED NEUTROPHILS ABSOLUTE COUNT: 4.09 K/UL (ref 1.5–8.1)
SODIUM BLD-SCNC: 129 MMOL/L (ref 135–144)
TOTAL PROTEIN: 7 G/DL (ref 6.4–8.3)
WBC # BLD: 6.2 K/UL (ref 3.5–11.3)
WBC # BLD: ABNORMAL 10*3/UL

## 2021-03-22 PROCEDURE — 85610 PROTHROMBIN TIME: CPT

## 2021-03-22 PROCEDURE — 85055 RETICULATED PLATELET ASSAY: CPT

## 2021-03-22 PROCEDURE — 36415 COLL VENOUS BLD VENIPUNCTURE: CPT

## 2021-03-22 PROCEDURE — 80053 COMPREHEN METABOLIC PANEL: CPT

## 2021-03-22 PROCEDURE — 82728 ASSAY OF FERRITIN: CPT

## 2021-03-22 PROCEDURE — 85025 COMPLETE CBC W/AUTO DIFF WBC: CPT

## 2021-04-21 ENCOUNTER — OFFICE VISIT (OUTPATIENT)
Dept: ONCOLOGY | Age: 71
End: 2021-04-21
Payer: COMMERCIAL

## 2021-04-21 VITALS
HEART RATE: 83 BPM | RESPIRATION RATE: 18 BRPM | WEIGHT: 195 LBS | TEMPERATURE: 98.2 F | BODY MASS INDEX: 27.2 KG/M2 | DIASTOLIC BLOOD PRESSURE: 69 MMHG | SYSTOLIC BLOOD PRESSURE: 146 MMHG

## 2021-04-21 DIAGNOSIS — D45 POLYCYTHEMIA RUBRA VERA (HCC): Primary | ICD-10-CM

## 2021-04-21 DIAGNOSIS — D68.59 HYPERCOAGULABLE STATE (HCC): ICD-10-CM

## 2021-04-21 DIAGNOSIS — D47.1 MYELOPROLIFERATIVE DISORDER (HCC): ICD-10-CM

## 2021-04-21 PROCEDURE — 4040F PNEUMOC VAC/ADMIN/RCVD: CPT | Performed by: INTERNAL MEDICINE

## 2021-04-21 PROCEDURE — 99214 OFFICE O/P EST MOD 30 MIN: CPT | Performed by: INTERNAL MEDICINE

## 2021-04-21 PROCEDURE — 3017F COLORECTAL CA SCREEN DOC REV: CPT | Performed by: INTERNAL MEDICINE

## 2021-04-21 PROCEDURE — 1036F TOBACCO NON-USER: CPT | Performed by: INTERNAL MEDICINE

## 2021-04-21 PROCEDURE — 1123F ACP DISCUSS/DSCN MKR DOCD: CPT | Performed by: INTERNAL MEDICINE

## 2021-04-21 PROCEDURE — G8417 CALC BMI ABV UP PARAM F/U: HCPCS | Performed by: INTERNAL MEDICINE

## 2021-04-21 PROCEDURE — G8427 DOCREV CUR MEDS BY ELIG CLIN: HCPCS | Performed by: INTERNAL MEDICINE

## 2021-04-21 NOTE — PROGRESS NOTES
DIAGNOSIS:   1. History of myeloproliferative disorder , Malcom 2+, likely polycythemia diagnosed in 2016  2. Factor V Leiden mutation and lupus anticoagulant  3. Spent phase polycythemia/myelofibrosis transformation diagnosed in 2019  CURRENT THERAPY:  1. Started hydroxyurea and aspirin  2. Upon diagnosis of myelofibrosis, transition to [facility]  3. Long-term anticoagulation with Coumadin  BRIEF CASE HISTORY:   Mariela Monge is a very pleasant 80 y.o. male who was followed by different hematologist, he was diagnosed with myeloproliferative syndrome likely polycythemia vera where he presented with polycythemia and thrombocytosis and he was Malcom 2+. He was started on hydroxyurea for multiple years. In 2019, he started having worsening cytopenias and bone marrow aspiration biopsy showed hypercellular marrow but increased megakaryocyte and increased fibrotic changes suggestive of spent phase polycythemia/myelofibrosis. He was started on Jakafi. The patient also has a history of hypercoagulable condition namely factor V Leiden and lupus anticoagulant and he is on long-term anticoagulation. INTERIM HISTORY:  The patient comes in today for a follow up, feeling well and doing well physically but he is under a lot of stress. His wife was diagnosed with second primary breast cancer and she is undergoing neoadjuvant chemotherapy before surgery and his daughter was diagnosed with stage III colon cancer and undergoing adjuvant chemotherapy. Both of them are undergoing active treatment and he is stressed out about that. Physically, he is doing well and has no symptoms. He is fairly active without any limitation, INR has been checked last month and it was therapeutic     PAST MEDICAL HISTORY: has a past medical history of Blood disorder, Diabetes mellitus ([address] and Hypertension. PAST SURGICAL HISTORY: has a past surgical history that includes Rotator cuff repair (Right, 2014).      CURRENT MEDICATIONS:  has a current anterior axillary line. It seems smaller compared to my previous exam extremities: Lower extremities show no edema, clubbing, or cyanosis. Breasts: Examination not done today. Neuro exam: intact cranial nerves bilaterally no motor or sensory deficit, gait is normal. Lymphatic: no adenopathy appreciated in the supraclavicular, axillary, cervical or inguinal area    Review of radiological studies:     Review of laboratory data:   Lab Results   Component Value Date    WBC 6.2 03/22/2021    HGB 14.1 03/22/2021    HCT 41.7 03/22/2021    MCV 84.4 03/22/2021    PLT See Reflexed IPF Result 03/22/2021       Chemistry        Component Value Date/Time     (L) 03/22/2021 0917    K 4.6 03/22/2021 0917    CL 92 (L) 03/22/2021 0917    CO2 28 03/22/2021 0917    BUN 14 03/22/2021 0917    CREATININE 0.99 03/22/2021 0917        Component Value Date/Time    CALCIUM 9.7 03/22/2021 0917    ALKPHOS 76 03/22/2021 0917    AST 23 03/22/2021 0917    ALT 26 03/22/2021 0917    BILITOT 0.45 03/22/2021 0917          IMPRESSION:   1. History of polycythemia  2. Spent phase  polycythemia/myelofibrosis  3. Hypercoagulable condition factor V Leiden and lupus anticoagulant  Plan:   Patient is doing very well and his blood counts are normalized with Jakafi. He is also doing very well with Coumadin and he has no symptoms of bleeding. We will continue current therapy without any changes. He was counseled about his stress because of his family situation. Counseling and support was provided. We will see him back in 3 months for follow-up.   I advised him to keep a close eye on his INR and continue to follow with Coumadin clinic

## 2021-05-18 DIAGNOSIS — D47.1 MYELOPROLIFERATIVE DISORDER (HCC): ICD-10-CM

## 2021-05-18 DIAGNOSIS — D45 POLYCYTHEMIA RUBRA VERA (HCC): Primary | ICD-10-CM

## 2021-05-18 RX ORDER — RUXOLITINIB 10 MG/1
TABLET ORAL
Qty: 60 TABLET | Refills: 5 | Status: SHIPPED | OUTPATIENT
Start: 2021-05-18 | End: 2021-10-25 | Stop reason: SDUPTHER

## 2021-07-01 ENCOUNTER — HOSPITAL ENCOUNTER (OUTPATIENT)
Age: 71
Discharge: HOME | End: 2021-07-01
Payer: COMMERCIAL

## 2021-07-01 DIAGNOSIS — D68.59 HYPERCOAGULABLE STATE (HCC): ICD-10-CM

## 2021-07-01 DIAGNOSIS — D47.1 MYELOPROLIFERATIVE DISORDER (HCC): ICD-10-CM

## 2021-07-01 DIAGNOSIS — D45 POLYCYTHEMIA RUBRA VERA (HCC): ICD-10-CM

## 2021-07-01 LAB
ABSOLUTE EOS #: 0.13 K/UL (ref 0–0.44)
ABSOLUTE IMMATURE GRANULOCYTE: 0.19 K/UL (ref 0–0.3)
ABSOLUTE LYMPH #: 1.09 K/UL (ref 1.1–3.7)
ABSOLUTE MONO #: 0.64 K/UL (ref 0.1–1.2)
ALBUMIN SERPL-MCNC: 4.5 G/DL (ref 3.5–5.2)
ALBUMIN/GLOBULIN RATIO: 1.6 (ref 1–2.5)
ALP BLD-CCNC: 79 U/L (ref 40–129)
ALT SERPL-CCNC: 17 U/L (ref 5–41)
ANION GAP SERPL CALCULATED.3IONS-SCNC: 12 MMOL/L (ref 9–17)
AST SERPL-CCNC: 24 U/L
BASOPHILS # BLD: 1 % (ref 0–2)
BASOPHILS ABSOLUTE: 0.06 K/UL (ref 0–0.2)
BILIRUB SERPL-MCNC: 0.41 MG/DL (ref 0.3–1.2)
BUN BLDV-MCNC: 24 MG/DL (ref 8–23)
BUN/CREAT BLD: 21 (ref 9–20)
CALCIUM SERPL-MCNC: 9.5 MG/DL (ref 8.6–10.4)
CHLORIDE BLD-SCNC: 94 MMOL/L (ref 98–107)
CO2: 24 MMOL/L (ref 20–31)
CREAT SERPL-MCNC: 1.16 MG/DL (ref 0.7–1.2)
DIFFERENTIAL TYPE: ABNORMAL
EOSINOPHILS RELATIVE PERCENT: 2 % (ref 1–4)
FERRITIN: 139 UG/L (ref 30–400)
GFR AFRICAN AMERICAN: >60 ML/MIN
GFR NON-AFRICAN AMERICAN: >60 ML/MIN
GFR SERPL CREATININE-BSD FRML MDRD: ABNORMAL ML/MIN/{1.73_M2}
GFR SERPL CREATININE-BSD FRML MDRD: ABNORMAL ML/MIN/{1.73_M2}
GLUCOSE BLD-MCNC: 199 MG/DL (ref 70–99)
HCT VFR BLD CALC: 40.8 % (ref 40.7–50.3)
HEMOGLOBIN: 13.9 G/DL (ref 13–17)
IMMATURE GRANULOCYTES: 3 %
INR BLD: 2.7
LYMPHOCYTES # BLD: 17 % (ref 24–43)
MCH RBC QN AUTO: 29.5 PG (ref 25.2–33.5)
MCHC RBC AUTO-ENTMCNC: 34.1 G/DL (ref 28.4–34.8)
MCV RBC AUTO: 86.6 FL (ref 82.6–102.9)
MONOCYTES # BLD: 10 % (ref 3–12)
MORPHOLOGY: ABNORMAL
MYELOCYTES ABSOLUTE COUNT: 0 K/UL
MYELOCYTES: 0 %
NRBC AUTOMATED: 1.4 PER 100 WBC
PDW BLD-RTO: 16.9 % (ref 11.8–14.4)
PLATELET # BLD: ABNORMAL K/UL (ref 138–453)
PLATELET ESTIMATE: ABNORMAL
PLATELET, FLUORESCENCE: 141 K/UL (ref 138–453)
PLATELET, IMMATURE FRACTION: 6.3 % (ref 1.1–10.3)
PMV BLD AUTO: ABNORMAL FL (ref 8.1–13.5)
POTASSIUM SERPL-SCNC: 4 MMOL/L (ref 3.7–5.3)
PROTHROMBIN TIME: 27.6 SEC (ref 11.5–14.2)
RBC # BLD: 4.71 M/UL (ref 4.21–5.77)
RBC # BLD: ABNORMAL 10*6/UL
SEG NEUTROPHILS: 67 % (ref 36–65)
SEGMENTED NEUTROPHILS ABSOLUTE COUNT: 4.29 K/UL (ref 1.5–8.1)
SODIUM BLD-SCNC: 130 MMOL/L (ref 135–144)
TOTAL PROTEIN: 7.3 G/DL (ref 6.4–8.3)
WBC # BLD: 6.4 K/UL (ref 3.5–11.3)
WBC # BLD: ABNORMAL 10*3/UL

## 2021-07-01 PROCEDURE — 36415 COLL VENOUS BLD VENIPUNCTURE: CPT

## 2021-07-01 PROCEDURE — 82728 ASSAY OF FERRITIN: CPT

## 2021-07-01 PROCEDURE — 85055 RETICULATED PLATELET ASSAY: CPT

## 2021-07-01 PROCEDURE — 85610 PROTHROMBIN TIME: CPT

## 2021-07-01 PROCEDURE — 80053 COMPREHEN METABOLIC PANEL: CPT

## 2021-07-01 PROCEDURE — 85025 COMPLETE CBC W/AUTO DIFF WBC: CPT

## 2021-08-03 ENCOUNTER — HOSPITAL ENCOUNTER (OUTPATIENT)
Age: 71
Discharge: HOME | End: 2021-08-03
Payer: COMMERCIAL

## 2021-08-03 LAB
ABSOLUTE EOS #: 0.12 K/UL (ref 0–0.44)
ABSOLUTE IMMATURE GRANULOCYTE: 0.3 K/UL (ref 0–0.3)
ABSOLUTE LYMPH #: 1 K/UL (ref 1.1–3.7)
ABSOLUTE MONO #: 0.59 K/UL (ref 0.1–1.2)
ALBUMIN SERPL-MCNC: 5.2 G/DL (ref 3.5–5.2)
ALBUMIN/GLOBULIN RATIO: 2.2 (ref 1–2.5)
ALP BLD-CCNC: 83 U/L (ref 40–129)
ALT SERPL-CCNC: 23 U/L (ref 5–41)
ANION GAP SERPL CALCULATED.3IONS-SCNC: 15 MMOL/L (ref 9–17)
AST SERPL-CCNC: 26 U/L
BASOPHILS # BLD: 2 % (ref 0–2)
BASOPHILS ABSOLUTE: 0.12 K/UL (ref 0–0.2)
BILIRUB SERPL-MCNC: 0.47 MG/DL (ref 0.3–1.2)
BUN BLDV-MCNC: 24 MG/DL (ref 8–23)
BUN/CREAT BLD: 23 (ref 9–20)
CALCIUM SERPL-MCNC: 9.8 MG/DL (ref 8.6–10.4)
CHLORIDE BLD-SCNC: 94 MMOL/L (ref 98–107)
CHOLESTEROL/HDL RATIO: 5.8
CHOLESTEROL: 146 MG/DL
CO2: 26 MMOL/L (ref 20–31)
CREAT SERPL-MCNC: 1.05 MG/DL (ref 0.7–1.2)
CREATININE URINE: 66.4 MG/DL (ref 39–259)
DIFFERENTIAL TYPE: ABNORMAL
EOSINOPHILS RELATIVE PERCENT: 2 % (ref 1–4)
GFR AFRICAN AMERICAN: >60 ML/MIN
GFR NON-AFRICAN AMERICAN: >60 ML/MIN
GFR SERPL CREATININE-BSD FRML MDRD: ABNORMAL ML/MIN/{1.73_M2}
GFR SERPL CREATININE-BSD FRML MDRD: ABNORMAL ML/MIN/{1.73_M2}
GLUCOSE BLD-MCNC: 118 MG/DL (ref 70–99)
HCT VFR BLD CALC: 44.8 % (ref 40.7–50.3)
HDLC SERPL-MCNC: 25 MG/DL
HEMOGLOBIN: 15 G/DL (ref 13–17)
IMMATURE GRANULOCYTES: 5 %
LDL CHOLESTEROL: 60 MG/DL (ref 0–130)
LYMPHOCYTES # BLD: 17 % (ref 24–43)
MCH RBC QN AUTO: 29.2 PG (ref 25.2–33.5)
MCHC RBC AUTO-ENTMCNC: 33.5 G/DL (ref 28.4–34.8)
MCV RBC AUTO: 87.3 FL (ref 82.6–102.9)
MICROALBUMIN/CREAT 24H UR: 18 MG/L
MICROALBUMIN/CREAT UR-RTO: 27 MCG/MG CREAT
MONOCYTES # BLD: 10 % (ref 3–12)
MORPHOLOGY: NORMAL
NRBC AUTOMATED: 1.4 PER 100 WBC
PDW BLD-RTO: 16.4 % (ref 11.8–14.4)
PLATELET # BLD: 125 K/UL (ref 138–453)
PLATELET ESTIMATE: ABNORMAL
PMV BLD AUTO: 9.4 FL (ref 8.1–13.5)
POTASSIUM SERPL-SCNC: 4.4 MMOL/L (ref 3.7–5.3)
RBC # BLD: 5.13 M/UL (ref 4.21–5.77)
RBC # BLD: ABNORMAL 10*6/UL
SEG NEUTROPHILS: 64 % (ref 36–65)
SEGMENTED NEUTROPHILS ABSOLUTE COUNT: 3.77 K/UL (ref 1.5–8.1)
SODIUM BLD-SCNC: 135 MMOL/L (ref 135–144)
TOTAL PROTEIN: 7.6 G/DL (ref 6.4–8.3)
TRIGL SERPL-MCNC: 303 MG/DL
TSH SERPL DL<=0.05 MIU/L-ACNC: 2.37 MIU/L (ref 0.3–5)
VLDLC SERPL CALC-MCNC: ABNORMAL MG/DL (ref 1–30)
WBC # BLD: 5.9 K/UL (ref 3.5–11.3)
WBC # BLD: ABNORMAL 10*3/UL

## 2021-08-03 PROCEDURE — 80061 LIPID PANEL: CPT

## 2021-08-03 PROCEDURE — 84443 ASSAY THYROID STIM HORMONE: CPT

## 2021-08-03 PROCEDURE — 80053 COMPREHEN METABOLIC PANEL: CPT

## 2021-08-03 PROCEDURE — 82570 ASSAY OF URINE CREATININE: CPT

## 2021-08-03 PROCEDURE — 36415 COLL VENOUS BLD VENIPUNCTURE: CPT

## 2021-08-03 PROCEDURE — 83036 HEMOGLOBIN GLYCOSYLATED A1C: CPT

## 2021-08-03 PROCEDURE — 82043 UR ALBUMIN QUANTITATIVE: CPT

## 2021-08-03 PROCEDURE — 85025 COMPLETE CBC W/AUTO DIFF WBC: CPT

## 2021-08-04 LAB
ESTIMATED AVERAGE GLUCOSE: 134 MG/DL
HBA1C MFR BLD: 6.3 % (ref 4–6)

## 2021-08-11 ENCOUNTER — OFFICE VISIT (OUTPATIENT)
Dept: ONCOLOGY | Age: 71
End: 2021-08-11
Payer: COMMERCIAL

## 2021-08-11 VITALS
BODY MASS INDEX: 25.9 KG/M2 | HEIGHT: 71 IN | RESPIRATION RATE: 18 BRPM | DIASTOLIC BLOOD PRESSURE: 62 MMHG | SYSTOLIC BLOOD PRESSURE: 136 MMHG | HEART RATE: 69 BPM | WEIGHT: 185 LBS | TEMPERATURE: 96.8 F

## 2021-08-11 DIAGNOSIS — D47.1 MYELOPROLIFERATIVE DISORDER (HCC): Primary | ICD-10-CM

## 2021-08-11 DIAGNOSIS — D45 POLYCYTHEMIA RUBRA VERA (HCC): ICD-10-CM

## 2021-08-11 DIAGNOSIS — D45 POLYCYTHEMIA RUBRA VERA (HCC): Primary | ICD-10-CM

## 2021-08-11 DIAGNOSIS — D68.59 HYPERCOAGULABLE STATE (HCC): ICD-10-CM

## 2021-08-11 PROCEDURE — 1036F TOBACCO NON-USER: CPT | Performed by: INTERNAL MEDICINE

## 2021-08-11 PROCEDURE — 1123F ACP DISCUSS/DSCN MKR DOCD: CPT | Performed by: INTERNAL MEDICINE

## 2021-08-11 PROCEDURE — 4040F PNEUMOC VAC/ADMIN/RCVD: CPT | Performed by: INTERNAL MEDICINE

## 2021-08-11 PROCEDURE — G8427 DOCREV CUR MEDS BY ELIG CLIN: HCPCS | Performed by: INTERNAL MEDICINE

## 2021-08-11 PROCEDURE — G8417 CALC BMI ABV UP PARAM F/U: HCPCS | Performed by: INTERNAL MEDICINE

## 2021-08-11 PROCEDURE — 3017F COLORECTAL CA SCREEN DOC REV: CPT | Performed by: INTERNAL MEDICINE

## 2021-08-11 PROCEDURE — 99214 OFFICE O/P EST MOD 30 MIN: CPT | Performed by: INTERNAL MEDICINE

## 2021-08-11 RX ORDER — ERTUGLIFLOZIN 5 MG/1
5 TABLET, FILM COATED ORAL DAILY
COMMUNITY
Start: 2021-07-29

## 2021-08-11 NOTE — PROGRESS NOTES
DIAGNOSIS:   1. History of myeloproliferative disorder , Malcom 2+, likely polycythemia diagnosed in 2016  2. Factor V Leiden mutation and lupus anticoagulant  3. Spent phase polycythemia/myelofibrosis transformation diagnosed in 2019  CURRENT THERAPY:  1. Started hydroxyurea and aspirin  2. Upon diagnosis of myelofibrosis, transition to [facility]  3. Long-term anticoagulation with Coumadin  BRIEF CASE HISTORY:   Delphine Gonzalez is a very pleasant 80 y.o. male who was followed by different hematologist, he was diagnosed with myeloproliferative syndrome likely polycythemia vera where he presented with polycythemia and thrombocytosis and he was Malcom 2+. He was started on hydroxyurea for multiple years. In 2019, he started having worsening cytopenias and bone marrow aspiration biopsy showed hypercellular marrow but increased megakaryocyte and increased fibrotic changes suggestive of spent phase polycythemia/myelofibrosis. He was started on Jakafi. The patient also has a history of hypercoagulable condition namely factor V Leiden and lupus anticoagulant and he is on long-term anticoagulation. INTERIM HISTORY:  The patient comes in today for a follow up, feeling well and doing well . Both his wife and his daughter were diagnosed with cancer and both did chemotherapy and did very well. His daughter had surgery and finished adjuvant therapy. His wife is done with neoadjuvant chemotherapy and planning surgery in the next few days. He is feeling much better and he is relieved that both of them are doing well. Physically he is doing okay. INR has been therapeutic. He has not had to do phlebotomy for a long time. He is on Jakafi and that is well-tolerated. PAST MEDICAL HISTORY: has a past medical history of Blood disorder, Diabetes mellitus ([address] and Hypertension. PAST SURGICAL HISTORY: has a past surgical history that includes Rotator cuff repair (Right, 2014).      CURRENT MEDICATIONS:  has a current medication list which includes the following prescription(s): steglatro, jakafi, tamsulosin, warfarin, warfarin, multiple vitamin, aspirin, omega-3 fatty acids-vitamin e, lisinopril-hydrochlorothiazide, kombiglyze xr, primidone, ra omeprazole, ferrous sulfate, and calcium ascorbate. ALLERGIES:  has No Known Allergies. FAMILY HISTORY: Negative for any hematological or oncological conditions. SOCIAL HISTORY:  reports that he quit smoking about 40 years ago. He has a 7.50 pack-year smoking history. He has never used smokeless tobacco. He reports previous alcohol use. He reports that he does not use drugs. REVIEW OF SYSTEMS:  General: no fever or night sweats, Weight is stable. ENT: No double or blurred vision, no tinnitus or hearing problem, no dysphagia or sore throat   Respiratory: No chest pain, no shortness of breath, no cough or hemoptysis. Cardiovascular: Denies chest pain, PND or orthopnea. No L E swelling or palpitations. Gastrointestinal:    No nausea or vomiting, abdominal pain, diarrhea or constipation. Genitourinary: Denies dysuria, hematuria, frequency, urgency or incontinence. Neurological: Denies headaches, decreased LOC, no sensory or motor focal deficits. Musculoskeletal:  No arthralgia no back pain or joint swelling. Skin: There are no rashes or bleeding. Easy bruisability secondary to anticoagulation  Psychiatric:  No anxiety, no depression. Endocrine: no diabetes or thyroid disease. Hematologic: no bleeding , no adenopathy. PHYSICAL EXAM: Shows a well appearing 80y.o.-year-old male who is not in pain or distress. Vital Signs: Blood pressure 136/62, pulse 69, temperature 96.8 °F (36 °C), temperature source Temporal, resp. rate 18, height 5' 11\" (1.803 m), weight 185 lb (83.9 kg). HEENT: Normocephalic and atraumatic. Pupils are equal, round, reactive to light and accommodation. Extraocular muscles are intact. Neck: Showed no JVD, no carotid bruit .  Lungs: Clear to auscultation bilaterally. Heart: Regular , systolic murmur unchanged abdomen: Soft, nontender. Enlarged spleen,  It seems smaller compared to my previous exam extremities: Lower extremities show no edema, clubbing, or cyanosis. Breasts: Examination not done today. Neuro exam: intact cranial nerves bilaterally no motor or sensory deficit, gait is normal. Lymphatic: no adenopathy appreciated in the supraclavicular, axillary, cervical or inguinal area    Review of radiological studies:     Review of laboratory data:   Lab Results   Component Value Date    WBC 5.9 08/03/2021    HGB 15.0 08/03/2021    HCT 44.8 08/03/2021    MCV 87.3 08/03/2021     (L) 08/03/2021       Chemistry        Component Value Date/Time     08/03/2021 1527    K 4.4 08/03/2021 1527    CL 94 (L) 08/03/2021 1527    CO2 26 08/03/2021 1527    BUN 24 (H) 08/03/2021 1527    CREATININE 1.05 08/03/2021 1527        Component Value Date/Time    CALCIUM 9.8 08/03/2021 1527    ALKPHOS 83 08/03/2021 1527    AST 26 08/03/2021 1527    ALT 23 08/03/2021 1527    BILITOT 0.47 08/03/2021 1527          IMPRESSION:   1. History of polycythemia  2. Spent phase  polycythemia/myelofibrosis  3. Hypercoagulable condition factor V Leiden and lupus anticoagulant  Plan:   The patient is doing very well on Jakafi. Hemoglobin and hematocrit are at target  Has mild thrombocytopenia which is stable  Continue supportive care, he is doing very well. Continue current therapy until progression.   His spleen is definitely getting smaller by my clinical exam

## 2021-09-27 ENCOUNTER — HOSPITAL ENCOUNTER (OUTPATIENT)
Age: 71
Discharge: HOME | End: 2021-09-27
Payer: COMMERCIAL

## 2021-09-27 DIAGNOSIS — D45 POLYCYTHEMIA RUBRA VERA (HCC): ICD-10-CM

## 2021-09-27 DIAGNOSIS — D68.59 HYPERCOAGULABLE STATE (HCC): ICD-10-CM

## 2021-09-27 LAB
ABSOLUTE EOS #: 0.07 K/UL (ref 0–0.44)
ABSOLUTE IMMATURE GRANULOCYTE: 0.26 K/UL (ref 0–0.3)
ABSOLUTE LYMPH #: 0.98 K/UL (ref 1.1–3.7)
ABSOLUTE MONO #: 1.11 K/UL (ref 0.1–1.2)
ALBUMIN SERPL-MCNC: 4.8 G/DL (ref 3.5–5.2)
ALBUMIN/GLOBULIN RATIO: 1.7 (ref 1–2.5)
ALP BLD-CCNC: 87 U/L (ref 40–129)
ALT SERPL-CCNC: 22 U/L (ref 5–41)
ANION GAP SERPL CALCULATED.3IONS-SCNC: 13 MMOL/L (ref 9–17)
AST SERPL-CCNC: 28 U/L
BASOPHILS # BLD: 1 % (ref 0–2)
BASOPHILS ABSOLUTE: 0.07 K/UL (ref 0–0.2)
BILIRUB SERPL-MCNC: 0.53 MG/DL (ref 0.3–1.2)
BUN BLDV-MCNC: 20 MG/DL (ref 8–23)
BUN/CREAT BLD: 19 (ref 9–20)
CALCIUM SERPL-MCNC: 9.8 MG/DL (ref 8.6–10.4)
CHLORIDE BLD-SCNC: 97 MMOL/L (ref 98–107)
CO2: 24 MMOL/L (ref 20–31)
CREAT SERPL-MCNC: 1.08 MG/DL (ref 0.7–1.2)
DIFFERENTIAL TYPE: ABNORMAL
EOSINOPHILS RELATIVE PERCENT: 1 % (ref 1–4)
GFR AFRICAN AMERICAN: >60 ML/MIN
GFR NON-AFRICAN AMERICAN: >60 ML/MIN
GFR SERPL CREATININE-BSD FRML MDRD: ABNORMAL ML/MIN/{1.73_M2}
GFR SERPL CREATININE-BSD FRML MDRD: ABNORMAL ML/MIN/{1.73_M2}
GLUCOSE BLD-MCNC: 123 MG/DL (ref 70–99)
HCT VFR BLD CALC: 47.5 % (ref 40.7–50.3)
HEMOGLOBIN: 15.5 G/DL (ref 13–17)
IMMATURE GRANULOCYTES: 4 %
INR BLD: 2.2
LYMPHOCYTES # BLD: 15 % (ref 24–43)
MCH RBC QN AUTO: 27.7 PG (ref 25.2–33.5)
MCHC RBC AUTO-ENTMCNC: 32.6 G/DL (ref 28.4–34.8)
MCV RBC AUTO: 85 FL (ref 82.6–102.9)
MONOCYTES # BLD: 17 % (ref 3–12)
MORPHOLOGY: NORMAL
NRBC AUTOMATED: 1.1 PER 100 WBC
PDW BLD-RTO: 16.6 % (ref 11.8–14.4)
PLATELET # BLD: ABNORMAL K/UL (ref 138–453)
PLATELET ESTIMATE: ABNORMAL
PLATELET, FLUORESCENCE: 133 K/UL (ref 138–453)
PLATELET, IMMATURE FRACTION: 7 % (ref 1.1–10.3)
PMV BLD AUTO: ABNORMAL FL (ref 8.1–13.5)
POTASSIUM SERPL-SCNC: 4.1 MMOL/L (ref 3.7–5.3)
PROTHROMBIN TIME: 23.7 SEC (ref 11.5–14.2)
RBC # BLD: 5.59 M/UL (ref 4.21–5.77)
RBC # BLD: ABNORMAL 10*6/UL
SEG NEUTROPHILS: 62 % (ref 36–65)
SEGMENTED NEUTROPHILS ABSOLUTE COUNT: 4.01 K/UL (ref 1.5–8.1)
SODIUM BLD-SCNC: 134 MMOL/L (ref 135–144)
TOTAL PROTEIN: 7.7 G/DL (ref 6.4–8.3)
WBC # BLD: 6.5 K/UL (ref 3.5–11.3)
WBC # BLD: ABNORMAL 10*3/UL

## 2021-09-27 PROCEDURE — 85610 PROTHROMBIN TIME: CPT

## 2021-09-27 PROCEDURE — 80053 COMPREHEN METABOLIC PANEL: CPT

## 2021-09-27 PROCEDURE — 85055 RETICULATED PLATELET ASSAY: CPT

## 2021-09-27 PROCEDURE — 36415 COLL VENOUS BLD VENIPUNCTURE: CPT

## 2021-09-27 PROCEDURE — 85025 COMPLETE CBC W/AUTO DIFF WBC: CPT

## 2021-10-25 DIAGNOSIS — D45 POLYCYTHEMIA RUBRA VERA (HCC): ICD-10-CM

## 2021-10-25 DIAGNOSIS — D47.1 MYELOPROLIFERATIVE DISORDER (HCC): ICD-10-CM

## 2021-10-25 RX ORDER — RUXOLITINIB 10 MG/1
TABLET ORAL
Qty: 60 TABLET | Refills: 5 | Status: SHIPPED | OUTPATIENT
Start: 2021-10-25 | End: 2022-03-09

## 2021-11-03 ENCOUNTER — HOSPITAL ENCOUNTER (OUTPATIENT)
Age: 71
Discharge: HOME | End: 2021-11-03
Payer: COMMERCIAL

## 2021-11-03 DIAGNOSIS — Z12.5 PROSTATE CANCER SCREENING: ICD-10-CM

## 2021-11-03 LAB — PROSTATE SPECIFIC ANTIGEN: 2.75 UG/L

## 2021-11-03 PROCEDURE — G0103 PSA SCREENING: HCPCS

## 2021-11-03 PROCEDURE — 36415 COLL VENOUS BLD VENIPUNCTURE: CPT

## 2021-11-09 ENCOUNTER — OFFICE VISIT (OUTPATIENT)
Dept: UROLOGY | Age: 71
End: 2021-11-09
Payer: COMMERCIAL

## 2021-11-09 VITALS
DIASTOLIC BLOOD PRESSURE: 70 MMHG | BODY MASS INDEX: 25.8 KG/M2 | SYSTOLIC BLOOD PRESSURE: 145 MMHG | HEART RATE: 75 BPM | WEIGHT: 185 LBS

## 2021-11-09 DIAGNOSIS — N40.1 BPH WITH OBSTRUCTION/LOWER URINARY TRACT SYMPTOMS: ICD-10-CM

## 2021-11-09 DIAGNOSIS — N13.8 BPH WITH OBSTRUCTION/LOWER URINARY TRACT SYMPTOMS: ICD-10-CM

## 2021-11-09 DIAGNOSIS — N52.9 ERECTILE DYSFUNCTION, UNSPECIFIED ERECTILE DYSFUNCTION TYPE: ICD-10-CM

## 2021-11-09 DIAGNOSIS — Z12.5 PROSTATE CANCER SCREENING: Primary | ICD-10-CM

## 2021-11-09 PROCEDURE — G8427 DOCREV CUR MEDS BY ELIG CLIN: HCPCS | Performed by: PHYSICIAN ASSISTANT

## 2021-11-09 PROCEDURE — 4040F PNEUMOC VAC/ADMIN/RCVD: CPT | Performed by: PHYSICIAN ASSISTANT

## 2021-11-09 PROCEDURE — G8417 CALC BMI ABV UP PARAM F/U: HCPCS | Performed by: PHYSICIAN ASSISTANT

## 2021-11-09 PROCEDURE — 3017F COLORECTAL CA SCREEN DOC REV: CPT | Performed by: PHYSICIAN ASSISTANT

## 2021-11-09 PROCEDURE — G8484 FLU IMMUNIZE NO ADMIN: HCPCS | Performed by: PHYSICIAN ASSISTANT

## 2021-11-09 PROCEDURE — 1123F ACP DISCUSS/DSCN MKR DOCD: CPT | Performed by: PHYSICIAN ASSISTANT

## 2021-11-09 PROCEDURE — 1036F TOBACCO NON-USER: CPT | Performed by: PHYSICIAN ASSISTANT

## 2021-11-09 PROCEDURE — 99213 OFFICE O/P EST LOW 20 MIN: CPT | Performed by: PHYSICIAN ASSISTANT

## 2021-11-09 RX ORDER — TRAMADOL HYDROCHLORIDE 50 MG/1
TABLET ORAL
COMMUNITY
Start: 2021-10-22 | End: 2022-01-13

## 2021-11-09 ASSESSMENT — ENCOUNTER SYMPTOMS
WHEEZING: 0
NAUSEA: 0
CONSTIPATION: 0
SHORTNESS OF BREATH: 0
VOMITING: 0
EYE REDNESS: 0
BACK PAIN: 0
COLOR CHANGE: 0
COUGH: 0
ABDOMINAL PAIN: 0

## 2021-11-09 NOTE — PROGRESS NOTES
HPI:      Patient is a 80 y.o. male in no acute distress. He is alert and oriented to person, place, and time. History:  Patient being seen here today as a new patient. Patient was referred here by Dr. Thelbert Osgood for erectile dysfunction. Patient does state that he has tried 50 mg Viagra in the past.  This was not helpful. This was sometime ago and he is unsure if he took the medication appropriately. He reports no recent gross hematuria dysuria. Patient did have a work-up for gross hematuria. This was negative. Patient has had no recent gross hematuria. He has not seen urology for some time. He is unsure whether or not his PSA has been checked. Patient has no history of elevated PSA. Revatio not effective    Offered Trimix patient declined    PSA:  11/2021 - 2.75  9/2020 - 2.61    Today:  Patient is here today for follow-up screening PSA and BPH. He did have a PSA prior to visit which was 2.75. This is up slightly from last year which was 2.61. He does take Flomax every day per his primary care. He is happy with his urinary symptoms. Offered Trimix again for erectile dysfunction he declines.       Past Medical History:   Diagnosis Date    Blood disorder     Diabetes mellitus ([facility] [address]     Hypertension      Past Surgical History:   Procedure Laterality Date    ROTATOR CUFF REPAIR Right 2014     Outpatient Encounter Medications as of 11/9/2021   Medication Sig Dispense Refill    ruxolitinib phosphate (JAKAFI) 10 MG tablet TAKE 1 TABLET 2 TIMES A DAY 60 tablet 5    STEGLATRO 5 MG TABS take 1 tablet by mouth every morning      tamsulosin (FLOMAX) 0.4 MG capsule take 1 capsule by mouth once daily 1/2 HOUR FOLLOWING THE SAME MEAL EACH DAY      warfarin (COUMADIN) 2 MG tablet daily Takes 2 tabs daily      warfarin (COUMADIN) 5 MG tablet take 1 tablet by mouth once daily      Multiple Vitamins-Minerals (MULTIVITAMIN ADULT PO) Take 1 tablet by mouth      aspirin 81 MG tablet Take 81 mg by mouth  OMEGA-3 FATTY ACIDS-VITAMIN E PO Take 1 capsule by mouth      lisinopril-hydrochlorothiazide (PRINZIDE;ZESTORETIC) 20-25 MG per tablet   0    KOMBIGLYZE XR 5-1000 MG TB24   0    primidone (MYSOLINE) 50 MG tablet   0    RA OMEPRAZOLE 20 MG EC tablet take 1 tablet by mouth twice a day  0    traMADol (ULTRAM) 50 MG tablet take 1 tablet by mouth every 8 hours if needed (Patient not taking: Reported on 11/9/2021)      ferrous sulfate (IRON 325) 325 (65 Fe) MG tablet Take 1 tablet by mouth daily 30 tablet 1    VITAMIN C, CALCIUM ASCORBATE, PO Take by mouth (Patient not taking: Reported on 8/11/2021)       No facility-administered encounter medications on file as of 11/9/2021.       Current Outpatient Medications on File Prior to Visit   Medication Sig Dispense Refill    ruxolitinib phosphate (JAKAFI) 10 MG tablet TAKE 1 TABLET 2 TIMES A DAY 60 tablet 5    STEGLATRO 5 MG TABS take 1 tablet by mouth every morning      tamsulosin (FLOMAX) 0.4 MG capsule take 1 capsule by mouth once daily 1/2 HOUR FOLLOWING THE SAME MEAL EACH DAY      warfarin (COUMADIN) 2 MG tablet daily Takes 2 tabs daily      warfarin (COUMADIN) 5 MG tablet take 1 tablet by mouth once daily      Multiple Vitamins-Minerals (MULTIVITAMIN ADULT PO) Take 1 tablet by mouth      aspirin 81 MG tablet Take 81 mg by mouth      OMEGA-3 FATTY ACIDS-VITAMIN E PO Take 1 capsule by mouth      lisinopril-hydrochlorothiazide (PRINZIDE;ZESTORETIC) 20-25 MG per tablet   0    KOMBIGLYZE XR 5-1000 MG TB24   0    primidone (MYSOLINE) 50 MG tablet   0    RA OMEPRAZOLE 20 MG EC tablet take 1 tablet by mouth twice a day  0    traMADol (ULTRAM) 50 MG tablet take 1 tablet by mouth every 8 hours if needed (Patient not taking: Reported on 11/9/2021)      ferrous sulfate (IRON 325) 325 (65 Fe) MG tablet Take 1 tablet by mouth daily 30 tablet 1    VITAMIN C, CALCIUM ASCORBATE, PO Take by mouth (Patient not taking: Reported on 8/11/2021)       No current facility-administered medications on file prior to visit. Patient has no known allergies. No family history on file. Social History     Tobacco Use   Smoking Status Former Smoker    Packs/day: 0.50    Years: 15.00    Pack years: 7.50    Quit date: 1981    Years since quittin.8   Smokeless Tobacco Never Used   Tobacco Comment    quit smoking 38 years ago       Social History     Substance and Sexual Activity   Alcohol Use Not Currently    Comment: Quit drinking       Review of Systems   Constitutional: Negative for appetite change, chills and fever. Eyes: Negative for redness and visual disturbance. Respiratory: Negative for cough, shortness of breath and wheezing. Cardiovascular: Negative for chest pain and leg swelling. Gastrointestinal: Negative for abdominal pain, constipation, nausea and vomiting. Genitourinary: Negative for decreased urine volume, difficulty urinating, dysuria, enuresis, flank pain, frequency, hematuria, penile discharge, penile pain, scrotal swelling, testicular pain and urgency. Positive for nocturia x1   Musculoskeletal: Negative for back pain, joint swelling and myalgias. Skin: Negative for color change, rash and wound. Neurological: Negative for dizziness, tremors and numbness. Hematological: Negative for adenopathy. Does not bruise/bleed easily. BP (!) 145/70 (Site: Right Upper Arm, Position: Sitting, Cuff Size: Medium Adult)   Pulse 75   Wt 185 lb (83.9 kg)   BMI 25.80 kg/m²       PHYSICAL EXAM:  Constitutional: Patient in no acute distress; Neuro: alert and oriented to person place and time. Psych: Mood and affect normal.  Lungs: Respiratory effort normal  Abdomen: Soft, non-tender, non-distended with no CVA, flank pain  Bladder non-tender and not distended. Rectal: Anus and perineum normal  Normal rectal tone with no masses  Prostate soft, non-tender to palpation. No palpable nodules. Estimated 60 grams.   Seminal vesicles not palpable. Lab Results   Component Value Date    BUN 20 09/27/2021     Lab Results   Component Value Date    CREATININE 1.08 09/27/2021     Lab Results   Component Value Date    PSA 2.75 11/03/2021    PSA 2.61 09/18/2020       ASSESSMENT:   Diagnosis Orders   1. Prostate cancer screening  PSA screening   2. Erectile dysfunction, unspecified erectile dysfunction type     3.  BPH with obstruction/lower urinary tract symptoms       PLAN:  Continues to decline Trimix    PSA in 1 year, TARAH in 2 years    Continue Flomax per PCP    Follow-up in 1 year

## 2022-01-13 ENCOUNTER — HOSPITAL ENCOUNTER (OUTPATIENT)
Age: 72
Setting detail: SPECIMEN
Discharge: HOME | End: 2022-01-13
Payer: COMMERCIAL

## 2022-01-13 ENCOUNTER — HOSPITAL ENCOUNTER (OUTPATIENT)
Age: 72
Discharge: HOME | End: 2022-01-13
Payer: COMMERCIAL

## 2022-01-13 ENCOUNTER — OFFICE VISIT (OUTPATIENT)
Dept: UROLOGY | Age: 72
End: 2022-01-13
Payer: COMMERCIAL

## 2022-01-13 VITALS
WEIGHT: 183 LBS | DIASTOLIC BLOOD PRESSURE: 73 MMHG | TEMPERATURE: 97.3 F | BODY MASS INDEX: 25.52 KG/M2 | HEART RATE: 92 BPM | SYSTOLIC BLOOD PRESSURE: 155 MMHG

## 2022-01-13 DIAGNOSIS — R31.0 GROSS HEMATURIA: ICD-10-CM

## 2022-01-13 DIAGNOSIS — D68.59 HYPERCOAGULABLE STATE (HCC): ICD-10-CM

## 2022-01-13 DIAGNOSIS — R31.0 GROSS HEMATURIA: Primary | ICD-10-CM

## 2022-01-13 DIAGNOSIS — D45 POLYCYTHEMIA RUBRA VERA (HCC): ICD-10-CM

## 2022-01-13 LAB
BUN BLDV-MCNC: 26 MG/DL (ref 8–23)
CREAT SERPL-MCNC: 1.05 MG/DL (ref 0.7–1.2)
GFR AFRICAN AMERICAN: >60 ML/MIN
GFR NON-AFRICAN AMERICAN: >60 ML/MIN
GFR SERPL CREATININE-BSD FRML MDRD: ABNORMAL ML/MIN/{1.73_M2}
GFR SERPL CREATININE-BSD FRML MDRD: ABNORMAL ML/MIN/{1.73_M2}
INR BLD: 2.3
PROTHROMBIN TIME: 24.3 SEC (ref 11.5–14.2)

## 2022-01-13 PROCEDURE — 1036F TOBACCO NON-USER: CPT | Performed by: NURSE PRACTITIONER

## 2022-01-13 PROCEDURE — 1123F ACP DISCUSS/DSCN MKR DOCD: CPT | Performed by: NURSE PRACTITIONER

## 2022-01-13 PROCEDURE — 84520 ASSAY OF UREA NITROGEN: CPT

## 2022-01-13 PROCEDURE — G8484 FLU IMMUNIZE NO ADMIN: HCPCS | Performed by: NURSE PRACTITIONER

## 2022-01-13 PROCEDURE — 99214 OFFICE O/P EST MOD 30 MIN: CPT | Performed by: NURSE PRACTITIONER

## 2022-01-13 PROCEDURE — 3017F COLORECTAL CA SCREEN DOC REV: CPT | Performed by: NURSE PRACTITIONER

## 2022-01-13 PROCEDURE — 85610 PROTHROMBIN TIME: CPT

## 2022-01-13 PROCEDURE — 36415 COLL VENOUS BLD VENIPUNCTURE: CPT

## 2022-01-13 PROCEDURE — 82565 ASSAY OF CREATININE: CPT

## 2022-01-13 PROCEDURE — 87086 URINE CULTURE/COLONY COUNT: CPT

## 2022-01-13 PROCEDURE — G8427 DOCREV CUR MEDS BY ELIG CLIN: HCPCS | Performed by: NURSE PRACTITIONER

## 2022-01-13 PROCEDURE — G8417 CALC BMI ABV UP PARAM F/U: HCPCS | Performed by: NURSE PRACTITIONER

## 2022-01-13 PROCEDURE — 4040F PNEUMOC VAC/ADMIN/RCVD: CPT | Performed by: NURSE PRACTITIONER

## 2022-01-13 NOTE — PROGRESS NOTES
HPI:          Patient is a 80 y.o. male in no acute distress. He is alert and oriented to person, place, and time. History  Previous patient of Dr. Uriel Price    Previous patient of Dr. Ashley Parikh for gross hematuria, elevated PSA, urethral stricture, and BPH.     9/2020 Referred here by Dr. Carol Patterson for erectile dysfunction. Has tried 50 mg Viagra in the past.  This was not helpful. This was sometime ago and he is unsure if he took the medication appropriately. Offered Trimix patient declined    PSA  11/2021 - 2.75  9/2020 - 2.61    Today  Here today with complaints of gross hematuria. This is not associated with dysuria, frequency, urgency, flank or abdominal pain or incontinence. He denies any known history of stones. He was a previous smoker, but quit in 1981.   Past Medical History:   Diagnosis Date    Blood disorder     Diabetes mellitus ([facility] [address]     Hypertension      Past Surgical History:   Procedure Laterality Date    ROTATOR CUFF REPAIR Right 2014     Outpatient Encounter Medications as of 1/13/2022   Medication Sig Dispense Refill    ruxolitinib phosphate (JAKAFI) 10 MG tablet TAKE 1 TABLET 2 TIMES A DAY 60 tablet 5    STEGLATRO 5 MG TABS take 1 tablet by mouth every morning      tamsulosin (FLOMAX) 0.4 MG capsule take 1 capsule by mouth once daily 1/2 HOUR FOLLOWING THE SAME MEAL EACH DAY      warfarin (COUMADIN) 2 MG tablet daily Takes 2 tabs daily      warfarin (COUMADIN) 5 MG tablet take 1 tablet by mouth once daily      Multiple Vitamins-Minerals (MULTIVITAMIN ADULT PO) Take 1 tablet by mouth      aspirin 81 MG tablet Take 81 mg by mouth      OMEGA-3 FATTY ACIDS-VITAMIN E PO Take 1 capsule by mouth daily       lisinopril-hydrochlorothiazide (PRINZIDE;ZESTORETIC) 20-25 MG per tablet 1 tablet daily   0    KOMBIGLYZE XR 5-1000 MG TB24 daily   0    primidone (MYSOLINE) 50 MG tablet 50 mg Take 3 tablets in the morning and 3 tablets in the evening  0    RA OMEPRAZOLE 20 MG EC tablet daily   0    [DISCONTINUED] traMADol (ULTRAM) 50 MG tablet take 1 tablet by mouth every 8 hours if needed (Patient not taking: Reported on 2021)      ferrous sulfate (IRON 325) 325 (65 Fe) MG tablet Take 1 tablet by mouth daily 30 tablet 1    [DISCONTINUED] VITAMIN C, CALCIUM ASCORBATE, PO Take by mouth (Patient not taking: Reported on 2021)       No facility-administered encounter medications on file as of 2022. Current Outpatient Medications on File Prior to Visit   Medication Sig Dispense Refill    ruxolitinib phosphate (JAKAFI) 10 MG tablet TAKE 1 TABLET 2 TIMES A DAY 60 tablet 5    STEGLATRO 5 MG TABS take 1 tablet by mouth every morning      tamsulosin (FLOMAX) 0.4 MG capsule take 1 capsule by mouth once daily 1/2 HOUR FOLLOWING THE SAME MEAL EACH DAY      warfarin (COUMADIN) 2 MG tablet daily Takes 2 tabs daily      warfarin (COUMADIN) 5 MG tablet take 1 tablet by mouth once daily      Multiple Vitamins-Minerals (MULTIVITAMIN ADULT PO) Take 1 tablet by mouth      aspirin 81 MG tablet Take 81 mg by mouth      OMEGA-3 FATTY ACIDS-VITAMIN E PO Take 1 capsule by mouth daily       lisinopril-hydrochlorothiazide (PRINZIDE;ZESTORETIC) 20-25 MG per tablet 1 tablet daily   0    KOMBIGLYZE XR 5-1000 MG TB24 daily   0    primidone (MYSOLINE) 50 MG tablet 50 mg Take 3 tablets in the morning and 3 tablets in the evening  0    RA OMEPRAZOLE 20 MG EC tablet daily   0    ferrous sulfate (IRON 325) 325 (65 Fe) MG tablet Take 1 tablet by mouth daily 30 tablet 1     No current facility-administered medications on file prior to visit. Patient has no known allergies. History reviewed. No pertinent family history.   Social History     Tobacco Use   Smoking Status Former Smoker    Packs/day: 0.50    Years: 15.00    Pack years: 7.50    Quit date: 1981    Years since quittin.0   Smokeless Tobacco Never Used   Tobacco Comment    quit smoking 38 years ago       Social History Substance and Sexual Activity   Alcohol Use Not Currently    Comment: Quit drinking       Review of Systems   Constitutional: Negative for appetite change, chills and fever. Eyes: Negative for redness and visual disturbance. Respiratory: Negative for cough, shortness of breath and wheezing. Cardiovascular: Negative for chest pain and leg swelling. Gastrointestinal: Negative for abdominal pain, constipation, nausea and vomiting. Genitourinary: Positive for hematuria. Negative for decreased urine volume, difficulty urinating, dysuria, enuresis, flank pain, frequency, penile discharge, penile pain, scrotal swelling, testicular pain and urgency. Musculoskeletal: Negative for back pain, joint swelling and myalgias. Skin: Negative for color change, rash and wound. Neurological: Negative for dizziness, tremors and numbness. Hematological: Negative for adenopathy. Does not bruise/bleed easily. BP (!) 155/73 (Site: Right Upper Arm, Position: Sitting, Cuff Size: Medium Adult)   Pulse 92   Temp 97.3 °F (36.3 °C)   Wt 183 lb (83 kg)   BMI 25.52 kg/m²       PHYSICAL EXAM:  Constitutional: Patient in no acute distress; Neuro: alert and oriented to person place and time. Psych: Mood and affect normal.  Skin: Normal  Lungs: Respiratory effort normal  Cardiovascular:  Normal peripheral pulses  Abdomen: Soft, non-tender, non-distended with no CVA, flank pain  Bladder non-tender and not distended. Lab Results   Component Value Date    BUN 20 09/27/2021     Lab Results   Component Value Date    CREATININE 1.08 09/27/2021     Lab Results   Component Value Date    PSA 2.75 11/03/2021    PSA 2.61 09/18/2020       ASSESSMENT:   Diagnosis Orders   1. Gross hematuria  CT UROGRAM    Culture, Urine    BUN & Creatinine         PLAN:  We will proceed with a hematuria work-up. He will obtain a CT urogram, then he will return to the office for lower tract visualization with cystoscopy.

## 2022-01-14 ENCOUNTER — TELEPHONE (OUTPATIENT)
Dept: UROLOGY | Age: 72
End: 2022-01-14

## 2022-01-14 LAB
CULTURE: NO GROWTH
Lab: NORMAL
SPECIMEN DESCRIPTION: NORMAL

## 2022-01-17 ENCOUNTER — TELEPHONE (OUTPATIENT)
Dept: UROLOGY | Age: 72
End: 2022-01-17

## 2022-01-17 NOTE — TELEPHONE ENCOUNTER
----- Message from LILIBETH Olea - CNP sent at 1/17/2022  8:53 AM EST -----  Call pt - urine cx reviewed and negative for UTI

## 2022-01-18 ASSESSMENT — ENCOUNTER SYMPTOMS
WHEEZING: 0
BACK PAIN: 0
EYE REDNESS: 0
ABDOMINAL PAIN: 0
CONSTIPATION: 0
COLOR CHANGE: 0
NAUSEA: 0
COUGH: 0
SHORTNESS OF BREATH: 0
VOMITING: 0

## 2022-01-19 ENCOUNTER — TELEPHONE (OUTPATIENT)
Dept: UROLOGY | Age: 72
End: 2022-01-19

## 2022-01-19 NOTE — TELEPHONE ENCOUNTER
Patient called to say he is glad he is having testing done. He saw blood yesterday and today. Plus the first time was last week when he called in.

## 2022-01-20 ENCOUNTER — HOSPITAL ENCOUNTER (OUTPATIENT)
Dept: CT IMAGING | Age: 72
Discharge: HOME | End: 2022-01-22
Payer: COMMERCIAL

## 2022-01-20 DIAGNOSIS — R31.0 GROSS HEMATURIA: ICD-10-CM

## 2022-01-20 PROCEDURE — 74178 CT ABD&PLV WO CNTR FLWD CNTR: CPT

## 2022-01-20 PROCEDURE — 6360000004 HC RX CONTRAST MEDICATION: Performed by: NURSE PRACTITIONER

## 2022-01-20 RX ADMIN — IOPAMIDOL 120 ML: 755 INJECTION, SOLUTION INTRAVENOUS at 13:31

## 2022-01-31 ENCOUNTER — PROCEDURE VISIT (OUTPATIENT)
Dept: UROLOGY | Age: 72
End: 2022-01-31
Payer: COMMERCIAL

## 2022-01-31 VITALS
TEMPERATURE: 97.7 F | BODY MASS INDEX: 26.18 KG/M2 | DIASTOLIC BLOOD PRESSURE: 76 MMHG | WEIGHT: 187 LBS | SYSTOLIC BLOOD PRESSURE: 165 MMHG | HEART RATE: 92 BPM | HEIGHT: 71 IN

## 2022-01-31 DIAGNOSIS — N40.1 BPH WITH OBSTRUCTION/LOWER URINARY TRACT SYMPTOMS: ICD-10-CM

## 2022-01-31 DIAGNOSIS — N52.9 ERECTILE DYSFUNCTION, UNSPECIFIED ERECTILE DYSFUNCTION TYPE: ICD-10-CM

## 2022-01-31 DIAGNOSIS — R31.0 GROSS HEMATURIA: Primary | ICD-10-CM

## 2022-01-31 DIAGNOSIS — N13.8 BPH WITH OBSTRUCTION/LOWER URINARY TRACT SYMPTOMS: ICD-10-CM

## 2022-01-31 PROCEDURE — G8417 CALC BMI ABV UP PARAM F/U: HCPCS | Performed by: UROLOGY

## 2022-01-31 PROCEDURE — 4040F PNEUMOC VAC/ADMIN/RCVD: CPT | Performed by: UROLOGY

## 2022-01-31 PROCEDURE — 1123F ACP DISCUSS/DSCN MKR DOCD: CPT | Performed by: UROLOGY

## 2022-01-31 PROCEDURE — 99213 OFFICE O/P EST LOW 20 MIN: CPT | Performed by: UROLOGY

## 2022-01-31 PROCEDURE — G8427 DOCREV CUR MEDS BY ELIG CLIN: HCPCS | Performed by: UROLOGY

## 2022-01-31 PROCEDURE — G8484 FLU IMMUNIZE NO ADMIN: HCPCS | Performed by: UROLOGY

## 2022-01-31 PROCEDURE — 52000 CYSTOURETHROSCOPY: CPT | Performed by: UROLOGY

## 2022-01-31 PROCEDURE — 3017F COLORECTAL CA SCREEN DOC REV: CPT | Performed by: UROLOGY

## 2022-01-31 PROCEDURE — 1036F TOBACCO NON-USER: CPT | Performed by: UROLOGY

## 2022-01-31 ASSESSMENT — ENCOUNTER SYMPTOMS
BACK PAIN: 0
ABDOMINAL PAIN: 0
WHEEZING: 0
SHORTNESS OF BREATH: 0
EYE REDNESS: 0
NAUSEA: 0
COLOR CHANGE: 0
COUGH: 0
EYE PAIN: 0
VOMITING: 0

## 2022-01-31 NOTE — PATIENT INSTRUCTIONS
SURVEY:    You may be receiving a survey from [employer] regarding your visit today. Please complete the survey to enable us to provide the highest quality of care to you and your family. If you cannot score us a very good on any question, please call the office to discuss how we could have made your experience a very good one. Thank you.

## 2022-01-31 NOTE — PROGRESS NOTES
HPI:          Patient is a 80 y.o. male in no acute distress. He is alert and oriented to person, place, and time. History  Previous patient of Dr. Matty Crouch     Previous patient of Dr. Carlos Rincon for gross hematuria, elevated PSA, urethral stricture, and BPH.      9/2020 Referred here by Dr. Luiz Harp for erectile dysfunction. Has tried 50 mg Viagra in the past.  This was not helpful. This was sometime ago and he is unsure if he took the medication appropriately. Offered Trimix patient declined     PSA  11/2021 - 2.75  9/2020 - 2.61    Currently  Patient is here today for lower tract visualization. The secondary to gross hematuria. Patient did get a recent CT urogram.  This film was independently reviewed today. This film did not have a significant  abnormalities on it. This did show an enlarged prostate. Patient has had a couple episodes of gross hematuria since last visit. He does take warfarin. No pain. He has no dysuria. Cystoscopy Procedure Note    Pre-operative Diagnosis: gross hematuria    Post-operative Diagnosis: Same     Surgeon: Blaire Trinh    Assistants: None    Anesthesia : Local    Procedure Details   The risks, benefits, complications, treatment options, and expected outcomes were discussed with the patient. The patient concurred with the proposed plan, giving informed consent. Cystoscopy was performed today under local anesthesia, using sterile technique. The patient was placed in the dorsal lithotomy position, prepped with CHG, and draped in the usual sterile fashion. A 14 Italian flexible cystoscope was used to systematically inspect both the urethra and bladder in their entirety. Findings:  Anterior urethra: normal without strictures  Hyperplasia: trilobar  Bladder: Normal mucosa, without lesions.   Ureteral orifice(s) was/were seen in the normal position and effluxing clear urine  Trabeculations No  Diverticulum No  Description: Extensive trilobar hyperplasia of the prostate         Specimens: Cytology/urine culture No                 Complications:  None; patient tolerated the procedure well. Disposition: home           Condition: stable        Past Medical History:   Diagnosis Date    Blood disorder     Diabetes mellitus ([address]     Hypertension      Past Surgical History:   Procedure Laterality Date    CYST REMOVAL      cyst removal on back    CYSTOSCOPY      Patient states 3 or 4 years ago    ROTATOR CUFF REPAIR Right 2014    TOE SURGERY Right     bone spur right great toe     Outpatient Encounter Medications as of 1/31/2022   Medication Sig Dispense Refill    ruxolitinib phosphate (JAKAFI) 10 MG tablet TAKE 1 TABLET 2 TIMES A DAY 60 tablet 5    STEGLATRO 5 MG TABS take 1 tablet by mouth every morning      tamsulosin (FLOMAX) 0.4 MG capsule take 1 capsule by mouth once daily 1/2 HOUR FOLLOWING THE SAME MEAL EACH DAY      warfarin (COUMADIN) 2 MG tablet daily Takes 2 tabs daily      warfarin (COUMADIN) 5 MG tablet take 1 tablet by mouth once daily      Multiple Vitamins-Minerals (MULTIVITAMIN ADULT PO) Take 1 tablet by mouth      aspirin 81 MG tablet Take 81 mg by mouth      OMEGA-3 FATTY ACIDS-VITAMIN E PO Take 1 capsule by mouth daily       lisinopril-hydrochlorothiazide (PRINZIDE;ZESTORETIC) 20-25 MG per tablet 1 tablet daily   0    KOMBIGLYZE XR 5-1000 MG TB24 daily   0    primidone (MYSOLINE) 50 MG tablet 50 mg Take 3 tablets in the morning and 3 tablets in the evening  0    RA OMEPRAZOLE 20 MG EC tablet daily   0    ferrous sulfate (IRON 325) 325 (65 Fe) MG tablet Take 1 tablet by mouth daily 30 tablet 1     No facility-administered encounter medications on file as of 1/31/2022.       Current Outpatient Medications on File Prior to Visit   Medication Sig Dispense Refill    ruxolitinib phosphate (JAKAFI) 10 MG tablet TAKE 1 TABLET 2 TIMES A DAY 60 tablet 5    STEGLATRO 5 MG TABS take 1 tablet by mouth every morning      tamsulosin (FLOMAX) 0.4 MG capsule take 1 capsule by mouth once daily 1/2 HOUR FOLLOWING THE SAME MEAL EACH DAY      warfarin (COUMADIN) 2 MG tablet daily Takes 2 tabs daily      warfarin (COUMADIN) 5 MG tablet take 1 tablet by mouth once daily      Multiple Vitamins-Minerals (MULTIVITAMIN ADULT PO) Take 1 tablet by mouth      aspirin 81 MG tablet Take 81 mg by mouth      OMEGA-3 FATTY ACIDS-VITAMIN E PO Take 1 capsule by mouth daily       lisinopril-hydrochlorothiazide (PRINZIDE;ZESTORETIC) 20-25 MG per tablet 1 tablet daily   0    KOMBIGLYZE XR 5-1000 MG TB24 daily   0    primidone (MYSOLINE) 50 MG tablet 50 mg Take 3 tablets in the morning and 3 tablets in the evening  0    RA OMEPRAZOLE 20 MG EC tablet daily   0    ferrous sulfate (IRON 325) 325 (65 Fe) MG tablet Take 1 tablet by mouth daily 30 tablet 1     No current facility-administered medications on file prior to visit. Patient has no known allergies. No family history on file. Social History     Tobacco Use   Smoking Status Former Smoker    Packs/day: 0.50    Years: 15.00    Pack years: 7.50    Quit date: 1981    Years since quittin.1   Smokeless Tobacco Never Used   Tobacco Comment    quit smoking 38 years ago       Social History     Substance and Sexual Activity   Alcohol Use Not Currently    Comment: Quit drinking       Review of Systems   Constitutional: Negative for appetite change, chills and fever. Eyes: Negative for pain, redness and visual disturbance. Respiratory: Negative for cough, shortness of breath and wheezing. Cardiovascular: Negative for chest pain and leg swelling. Gastrointestinal: Negative for abdominal pain, nausea and vomiting. Genitourinary: Positive for hematuria. Negative for difficulty urinating, dysuria, flank pain, frequency, penile discharge, scrotal swelling and testicular pain. Musculoskeletal: Negative for back pain, joint swelling and myalgias.    Skin: Negative for color change, rash and wound.   Neurological: Negative for dizziness, tremors and numbness. Hematological: Negative for adenopathy. Does not bruise/bleed easily. BP (!) 165/76 (Site: Left Upper Arm, Position: Sitting, Cuff Size: Medium Adult)   Pulse 92   Temp 97.7 °F (36.5 °C) (Infrared)   Ht 5' 11\" (1.803 m)   Wt 187 lb (84.8 kg)   BMI 26.08 kg/m²       PHYSICAL EXAM:  Constitutional: Patient in no acute distress; Neuro: alert and oriented to person place and time. Psych: Mood and affect normal.  Skin: Normal  Lungs: Respiratory effort normal  Cardiovascular:  Normal peripheral pulses  Abdomen: Soft, non-tender, non-distended with no CVA, flank pain  Bladder non-tender and not distended. Lymphatics: no palpable lymphadenopathy  Penis normal  Urethral meatus normal  Scrotal exam normal  Testicles normal bilaterally  Epididymis normal bilaterally  No evidence of inguinal hernia  Anus and perineum normal  Normal rectal tone with no masses  Prostate soft, non-tender to palpation. No palpable nodules. Estimated 40 grams. Seminal vesicles not palpable. Lab Results   Component Value Date    BUN 26 (H) 01/13/2022     Lab Results   Component Value Date    CREATININE 1.05 01/13/2022     Lab Results   Component Value Date    PSA 2.75 11/03/2021    PSA 2.61 09/18/2020       ASSESSMENT:  This is a 80 y.o. male with the following diagnoses:   Diagnosis Orders   1. Gross hematuria  HI CYSTOURETHROSCOPY   2. Erectile dysfunction, unspecified erectile dysfunction type     3. BPH with obstruction/lower urinary tract symptoms         PLAN:  We will plan for follow-up in 1 year. He will call us back sooner with issues. I did offer him finasteride or Flomax today for lower urinary tract symptoms. This point time he does not feel that his symptoms warrant any medications. Patient again wants no therapy for his erectile dysfunction.

## 2022-01-31 NOTE — PROGRESS NOTES
During cystoscopy the following was utilized on patient with no adverse affects:    45% SODIUM CHLORIDE 500 ML BAG  Lot number: [id number]  Expiration date: 11/22      LIDOCAINE HYDROCHLORIDE JELLY 2%   Lot number: [id number]  Expiration date: 05/23

## 2022-02-01 ENCOUNTER — HOSPITAL ENCOUNTER (INPATIENT)
Age: 72
LOS: 5 days | Discharge: HOME | DRG: 813 | End: 2022-02-06
Attending: INTERNAL MEDICINE | Admitting: INTERNAL MEDICINE
Payer: COMMERCIAL

## 2022-02-01 PROBLEM — R31.9 HEMATURIA: Status: ACTIVE | Noted: 2022-02-01

## 2022-02-01 PROBLEM — R31.0 GROSS HEMATURIA: Status: ACTIVE | Noted: 2022-02-01

## 2022-02-01 PROCEDURE — 2580000003 HC RX 258: Performed by: NURSE PRACTITIONER

## 2022-02-01 PROCEDURE — 94761 N-INVAS EAR/PLS OXIMETRY MLT: CPT

## 2022-02-01 PROCEDURE — 1200000000 HC SEMI PRIVATE

## 2022-02-01 PROCEDURE — 6370000000 HC RX 637 (ALT 250 FOR IP): Performed by: NURSE PRACTITIONER

## 2022-02-01 RX ORDER — PANTOPRAZOLE SODIUM 40 MG/1
40 TABLET, DELAYED RELEASE ORAL
Status: DISCONTINUED | OUTPATIENT
Start: 2022-02-02 | End: 2022-02-06 | Stop reason: HOSPADM

## 2022-02-01 RX ORDER — FERROUS SULFATE 325(65) MG
325 TABLET ORAL DAILY
Status: DISCONTINUED | OUTPATIENT
Start: 2022-02-01 | End: 2022-02-06 | Stop reason: HOSPADM

## 2022-02-01 RX ORDER — LISINOPRIL AND HYDROCHLOROTHIAZIDE 25; 20 MG/1; MG/1
1 TABLET ORAL DAILY
Status: DISCONTINUED | OUTPATIENT
Start: 2022-02-01 | End: 2022-02-06 | Stop reason: HOSPADM

## 2022-02-01 RX ORDER — SODIUM CHLORIDE 0.9 % (FLUSH) 0.9 %
10 SYRINGE (ML) INJECTION EVERY 12 HOURS SCHEDULED
Status: DISCONTINUED | OUTPATIENT
Start: 2022-02-01 | End: 2022-02-06 | Stop reason: HOSPADM

## 2022-02-01 RX ORDER — POLYETHYLENE GLYCOL 3350 17 G/17G
17 POWDER, FOR SOLUTION ORAL DAILY PRN
Status: DISCONTINUED | OUTPATIENT
Start: 2022-02-01 | End: 2022-02-06 | Stop reason: HOSPADM

## 2022-02-01 RX ORDER — ALOGLIPTIN 25 MG/1
25 TABLET, FILM COATED ORAL DAILY
Status: DISCONTINUED | OUTPATIENT
Start: 2022-02-01 | End: 2022-02-06 | Stop reason: HOSPADM

## 2022-02-01 RX ORDER — SODIUM CHLORIDE 0.9 % (FLUSH) 0.9 %
10 SYRINGE (ML) INJECTION PRN
Status: DISCONTINUED | OUTPATIENT
Start: 2022-02-01 | End: 2022-02-06 | Stop reason: HOSPADM

## 2022-02-01 RX ORDER — ONDANSETRON 4 MG/1
4 TABLET, ORALLY DISINTEGRATING ORAL EVERY 8 HOURS PRN
Status: DISCONTINUED | OUTPATIENT
Start: 2022-02-01 | End: 2022-02-06 | Stop reason: HOSPADM

## 2022-02-01 RX ORDER — PRIMIDONE 50 MG/1
150 TABLET ORAL 2 TIMES DAILY
Status: DISCONTINUED | OUTPATIENT
Start: 2022-02-01 | End: 2022-02-06 | Stop reason: HOSPADM

## 2022-02-01 RX ORDER — ACETAMINOPHEN 650 MG/1
650 SUPPOSITORY RECTAL EVERY 6 HOURS PRN
Status: DISCONTINUED | OUTPATIENT
Start: 2022-02-01 | End: 2022-02-06 | Stop reason: HOSPADM

## 2022-02-01 RX ORDER — TAMSULOSIN HYDROCHLORIDE 0.4 MG/1
0.4 CAPSULE ORAL DAILY
Status: DISCONTINUED | OUTPATIENT
Start: 2022-02-01 | End: 2022-02-06 | Stop reason: HOSPADM

## 2022-02-01 RX ORDER — ONDANSETRON 2 MG/ML
4 INJECTION INTRAMUSCULAR; INTRAVENOUS EVERY 6 HOURS PRN
Status: DISCONTINUED | OUTPATIENT
Start: 2022-02-01 | End: 2022-02-06 | Stop reason: HOSPADM

## 2022-02-01 RX ORDER — METFORMIN HYDROCHLORIDE 500 MG/1
1000 TABLET, EXTENDED RELEASE ORAL
Status: DISCONTINUED | OUTPATIENT
Start: 2022-02-02 | End: 2022-02-06 | Stop reason: HOSPADM

## 2022-02-01 RX ORDER — ACETAMINOPHEN 325 MG/1
650 TABLET ORAL EVERY 6 HOURS PRN
Status: DISCONTINUED | OUTPATIENT
Start: 2022-02-01 | End: 2022-02-06 | Stop reason: HOSPADM

## 2022-02-01 RX ORDER — SODIUM CHLORIDE 9 MG/ML
INJECTION, SOLUTION INTRAVENOUS CONTINUOUS
Status: DISCONTINUED | OUTPATIENT
Start: 2022-02-01 | End: 2022-02-06 | Stop reason: HOSPADM

## 2022-02-01 RX ORDER — SODIUM CHLORIDE 9 MG/ML
25 INJECTION, SOLUTION INTRAVENOUS PRN
Status: DISCONTINUED | OUTPATIENT
Start: 2022-02-01 | End: 2022-02-06 | Stop reason: HOSPADM

## 2022-02-01 RX ADMIN — PRIMIDONE 150 MG: 50 TABLET ORAL at 13:30

## 2022-02-01 RX ADMIN — SODIUM CHLORIDE: 9 INJECTION, SOLUTION INTRAVENOUS at 12:19

## 2022-02-01 RX ADMIN — FERROUS SULFATE TAB 325 MG (65 MG ELEMENTAL FE) 325 MG: 325 (65 FE) TAB at 13:30

## 2022-02-01 RX ADMIN — TAMSULOSIN HYDROCHLORIDE 0.4 MG: 0.4 CAPSULE ORAL at 20:52

## 2022-02-01 RX ADMIN — LISINOPRIL AND HYDROCHLOROTHIAZIDE 1 TABLET: 25; 20 TABLET ORAL at 13:30

## 2022-02-01 RX ADMIN — PRIMIDONE 150 MG: 50 TABLET ORAL at 20:52

## 2022-02-01 ASSESSMENT — PAIN DESCRIPTION - FREQUENCY: FREQUENCY: CONTINUOUS

## 2022-02-01 ASSESSMENT — PAIN DESCRIPTION - PROGRESSION: CLINICAL_PROGRESSION: GRADUALLY WORSENING

## 2022-02-01 ASSESSMENT — PAIN DESCRIPTION - PAIN TYPE: TYPE: ACUTE PAIN

## 2022-02-01 ASSESSMENT — PAIN DESCRIPTION - ONSET: ONSET: ON-GOING

## 2022-02-01 ASSESSMENT — PAIN SCALES - GENERAL
PAINLEVEL_OUTOF10: 0
PAINLEVEL_OUTOF10: 7

## 2022-02-01 ASSESSMENT — PAIN - FUNCTIONAL ASSESSMENT: PAIN_FUNCTIONAL_ASSESSMENT: ACTIVITIES ARE NOT PREVENTED

## 2022-02-01 ASSESSMENT — PAIN DESCRIPTION - DESCRIPTORS: DESCRIPTORS: ACHING

## 2022-02-01 ASSESSMENT — PAIN DESCRIPTION - LOCATION: LOCATION: ABDOMEN

## 2022-02-01 ASSESSMENT — PAIN DESCRIPTION - ORIENTATION: ORIENTATION: LOWER

## 2022-02-01 NOTE — PROGRESS NOTES
Discussed discharge plans with the patient and wife. Patient is a 80year old male here with Hematuria, clot retention. He is alert , oriented , pleasant , and cooperative during our conversation. Patient is  and lives at home with his wife. He uses no medical equipment. The wife does the cooking and the cleaning. He is independent with his ADL's. Patient manages his own medication and drives. He has no outside services. His PCP is Liane Mcclelland MD. He has medical insurance that helps with medication costs. Patient receives some of his medication through medication programs. He served during the [facility]. He was in the [employer]. The discharge plan is home with no services at this time. He has advance directives but they are not on file. NATHALYW to monitor and assist with any needs or concerns as they arise.     ELENA Yuen

## 2022-02-01 NOTE — CONSULTS
UROLOGY CONSULT  Patient:  Johny Barboza  MRN: [id number]    Chief Complaint: Hematuria, clot retention    HISTORY OF PRESENT ILLNESS:   The patient is a 80 y.o. male who presented as a transfer from outside hospital.  Patient is known to our office. Patient underwent cystoscopy on 1/31/2022 as part of a gross hematuria work-up. Patient does take chronic Coumadin. After office visit patient did go home and did have hematuria. Patient was unable to urinate and therefore went to outside hospital for evaluation. Patient states that patient's abdomen was very distended. Patient was unable to pass any urine. Outside hospital attempted to pass catheter multiple times prior to ER physician placing three-way catheter. Decision was ultimately made to transfer patient to [facility]. Upon arrival to [facility] patient was started on CBI. Patient's hemoglobin at outside hospital was 15.2, white blood cell count was 10.4. BUN was 34 and creatinine was 1.26.     Past Medical History:    Past Medical History:   Diagnosis Date    Blood disorder     Diabetes mellitus ([facility] [address]     Hypertension        Past Surgical History:    Past Surgical History:   Procedure Laterality Date    CYST REMOVAL      cyst removal on back    CYSTOSCOPY      Patient states 3 or 4 years ago    ROTATOR CUFF REPAIR Right 2014    TOE SURGERY Right     bone spur right great toe        Medications:    Scheduled Meds:   ferrous sulfate  325 mg Oral Daily    Ertugliflozin L-PyroglutamicAc  5 mg Oral Daily    tamsulosin  0.4 mg Oral Daily    ruxolitinib phosphate  10 mg Oral BID    [START ON 2/2/2022] pantoprazole  40 mg Oral QAM AC    primidone  150 mg Oral BID    lisinopril-hydroCHLOROthiazide  1 tablet Oral Daily    sodium chloride flush  10 mL IntraVENous 2 times per day    [START ON 2/2/2022] metFORMIN  1,000 mg Oral Daily with breakfast    And    alogliptin  25 mg Oral Daily     Continuous Infusions:   sodium chloride 75 mL/hr at 22 1219    sodium chloride       PRN Meds:.sodium chloride flush, sodium chloride, ondansetron **OR** ondansetron, polyethylene glycol, acetaminophen **OR** acetaminophen    Allergies:    Patient has no known allergies. Social History:    Social History     Socioeconomic History    Marital status:      Spouse name: Not on file    Number of children: Not on file    Years of education: Not on file    Highest education level: Not on file   Occupational History    Not on file   Tobacco Use    Smoking status: Former Smoker     Packs/day: 0.50     Years: 15.00     Pack years: 7.50     Quit date: 1981     Years since quittin.1    Smokeless tobacco: Never Used    Tobacco comment: quit smoking 38 years ago   Substance and Sexual Activity    Alcohol use: Not Currently     Comment: Quit drinking    Drug use: Never    Sexual activity: Not Currently     Partners: Female   Other Topics Concern    Not on file   Social History Narrative    Not on file     Social Determinants of Health     Financial Resource Strain:     Difficulty of Paying Living Expenses: Not on file   Food Insecurity:     Worried About [address] in the Last Year: Not on file    [facility] in the Last Year: Not on file   Transportation Needs:     Lack of Transportation (Medical): Not on file    Lack of Transportation (Non-Medical):  Not on file   Physical Activity:     Days of Exercise per Week: Not on file    Minutes of Exercise per Session: Not on file   Stress:     Feeling of Stress : Not on file   Social Connections:     Frequency of Communication with Friends and Family: Not on file    Frequency of Social Gatherings with Friends and Family: Not on file    Attends Je[employer] witness Services: Not on file    Active Member of Clubs or Organizations: Not on file    Attends Club or Organization Meetings: Not on file    Marital Status: Not on file   Intimate Partner Violence:     Fear of Current or Ex-Partner: Not on file    Emotionally Abused: Not on file    Physically Abused: Not on file    Sexually Abused: Not on file   Housing Stability:     Unable to Pay for Housing in the Last Year: Not on file    Number of Places Lived in the Last Year: Not on file    Unstable Housing in the Last Year: Not on file       Family History:    No family history on file. REVIEW OF SYSTEMS:  Constitutional: Negative for fever, chills and unexpected weight change. Respiratory: Negative for shortness of breath and wheezing. Cardiovascular: Negative for chest pain and palpitations. Gastrointestinal: Negative for nausea, vomiting, diarrhea, constipation  Endocrine: Negative for polydipsia and polyuria. Genitourinary: Positive for gross hematuria and urinary retention  Musculoskeletal: Negative for myalgias and joint swelling. Skin: Negative for rash and wound. Neurological: Negative for dizziness and headaches. Hematological: Negative for adenopathy. Does not bruise/bleed easily. PHYSICAL EXAM:  Patient Vitals for the past 24 hrs:   BP Temp Temp src Pulse Resp SpO2 Height Weight   02/01/22 1010 (!) 142/70 98.8 °F (37.1 °C) Temporal 100 20 94 % 5' 11\" (1.803 m) 179 lb 11.2 oz (81.5 kg)     Constitutional: Patient resting comfortably, in no acute distress. Neuro: Alert and oriented to person place and time. Psych: Mood and affect normal.  HEENT: normocephalic, atraumatic  Lungs: Respiratory effort normal, unlabored  Cardiovascular:  Normal peripheral pulses  Abdomen: Soft, non-tender, non-distended  : No CVA tenderness bilat. Bladder non-tender and not distended. Genital/Rectal: Three-way Prather catheter in place, normal penis, rectal deferred  Extremities: Calves are non-tender, equal in circumference bilat     LABS:  No results for input(s): WBC, HGB, HCT, MCV, PLT in the last 72 hours. No results for input(s): NA, K, CL, CO2, PHOS, BUN, CREATININE, CA in the last 72 hours.   Lab Results Component Value Date    PSA 2.75 11/03/2021    PSA 2.61 09/18/2020       Urinalysis: No results for input(s): COLORU, PHUR, LABCAST, WBCUA, RBCUA, MUCUS, TRICHOMONAS, YEAST, BACTERIA, CLARITYU, SPECGRAV, LEUKOCYTESUR, UROBILINOGEN, Wallene Keel in the last 72 hours. Invalid input(s): NITRATE, GLUCOSEUKETONESUAMORPHOUS     Additional Lab/culture results:  none    Imaging Results:  none    ASSESSMENT AND PLAN:  Impression:    Patient Active Problem List   Diagnosis    Myeloproliferative disorder ([facility] [address]    Polycythemia rubra vera ([facility] [address] [facility]    Hypercoagulable state ([facility] [address]    Microhematuria    Erectile dysfunction    Hematuria     Plan:   I did hand irrigate patient with 4 L of normal saline. Patient tolerated this well. I did get multiple medium size clots returned during irrigation. The final liter did not have any clots on irrigation. Patient was returned to CBI    No plans for the operating room at this time. Advance patient's diet to diabetic diet    Hold Coumadin at this time    Maintain three-way catheter    CBI resumed - hourly nursing CBI checks, titrate till clear    ------------------------------------------------  Thank you for the consultation.   I have discussed the care of this patient including pertinent history and exam findings, lab and imaging results, assessment, orders and plan as documented above with Lexis Spain MD.    Electronically signed by Max Viveros PA-C on 2/1/2022 at 1:17 PM

## 2022-02-01 NOTE — PROGRESS NOTES
Pt assisted to bathroom as contact guard, slightly unsteady on feet. CBI appears to be occluded, no longer dripping. Pt assisted back to bed. Writer manually aspirated 200 ml of bloody irrigant and clots from catheter then catheter was free flowing. Pt states that pain is much improved now that catheter was flowing.

## 2022-02-01 NOTE — LETTER
[facility] AT THE [facility] MED SURG  [facility] [id number]  Phone: [phone]             February 6, 2022    Patient: Marie Remy   YOB: 1950   Date of Visit: 2/1/2022       To Whom It May Concern:    Nazanin Lindsay was seen and treated in our facility  beginning 2/1/2022 until 2/6/2022. He may return to work once he has been cleared by physician.       Sincerely,       Edilberto Hamilton RN         Signature:__________________________________

## 2022-02-01 NOTE — H&P
History and Physical    Patient:  Juice Fitch  MRN: [id number]    Chief Complaint: Inability to urinate    History Obtained From:  patient, electronic medical record    PCP: Monserrat Kenny MD    History of Present Illness: The patient is a 80 y.o. male who presented as an admission from [facility] due to inability to urinate and hematuria. Patient underwent cystoscopy on 1/31/2022 due to gross hematuria. Findings at that time were normal mucosa without lesions of the bladder however had extensive try lobular hyperplasia of the prostate. Patient was discharged home however was unable to void. Patient presented to [facility] emergency room and bladder scan showed greater than 1000 mL in his bladder. Several attempts were taken before Prather catheter was placed patient was placed on continuous irrigation with gross hematuria and clots and transferred to [facility] for admission. Past Medical History:        Diagnosis Date    Blood disorder     Diabetes mellitus ([facility] [address]     Hypertension        Past Surgical History:        Procedure Laterality Date    CYST REMOVAL      cyst removal on back    CYSTOSCOPY      Patient states 3 or 4 years ago    ROTATOR CUFF REPAIR Right 2014    TOE SURGERY Right     bone spur right great toe       Medications Prior to Admission:    Prior to Admission medications    Medication Sig Start Date End Date Taking?  Authorizing Provider   ruxolitinib phosphate (JAKAFI) 10 MG tablet TAKE 1 TABLET 2 TIMES A DAY 10/25/21  Yes Renea Figueroa MD   STEGLATRO 5 MG TABS Take 5 mg by mouth daily  7/29/21  Yes Historical Provider, MD   tamsulosin (FLOMAX) 0.4 MG capsule take 1 capsule by mouth once daily 1/2 HOUR FOLLOWING THE SAME MEAL EACH DAY 7/3/20  Yes Historical Provider, MD   aspirin 81 MG tablet Take 81 mg by mouth   Yes Historical Provider, MD   lisinopril-hydrochlorothiazide (PRINZIDE;ZESTORETIC) 20-25 MG per tablet 1 tablet daily  12/24/18  Yes Historical Provider, MD   KOMBIGLYZE XR 5-1000 MG TB24 daily  2/21/19  Yes Historical Provider, MD   primidone (MYSOLINE) 50 MG tablet 50 mg Take 3 tablets in the morning and 3 tablets in the evening 3/10/19  Yes Historical Provider, MD   warfarin (COUMADIN) 2 MG tablet daily Takes 2 tabs daily 7/8/20   Historical Provider, MD   warfarin (COUMADIN) 5 MG tablet take 1 tablet by mouth once daily 6/24/20   Historical Provider, MD   ferrous sulfate (IRON 325) 325 (65 Fe) MG tablet Take 1 tablet by mouth daily 6/2/20 8/1/20  Dawit Orosco MD   Multiple Vitamins-Minerals (MULTIVITAMIN ADULT PO) Take 1 tablet by mouth    Historical Provider, MD   OMEGA-3 FATTY ACIDS-VITAMIN E PO Take 1 capsule by mouth daily     Historical Provider, MD MANUEL OMEPRAZOLE 20 MG EC tablet Take 20 mg by mouth daily  2/1/19   Historical Provider, MD       Allergies:  Patient has no known allergies. Social History:   TOBACCO:   reports that he quit smoking about 41 years ago. He has a 7.50 pack-year smoking history. He has never used smokeless tobacco.  ETOH:   reports previous alcohol use. Family History:   No family history on file. Allergies:  Patient has no known allergies. Medications Prior to Admission:    Prior to Admission medications    Medication Sig Start Date End Date Taking?  Authorizing Provider   ruxolitinib phosphate (JAKAFI) 10 MG tablet TAKE 1 TABLET 2 TIMES A DAY 10/25/21  Yes Tatum Rangel MD   STEGLATRO 5 MG TABS Take 5 mg by mouth daily  7/29/21  Yes Historical Provider, MD   tamsulosin (FLOMAX) 0.4 MG capsule take 1 capsule by mouth once daily 1/2 HOUR FOLLOWING THE SAME MEAL EACH DAY 7/3/20  Yes Historical Provider, MD   aspirin 81 MG tablet Take 81 mg by mouth   Yes Historical Provider, MD   lisinopril-hydrochlorothiazide (PRINZIDE;ZESTORETIC) 20-25 MG per tablet 1 tablet daily  12/24/18  Yes Historical Provider, MD OWENIGLYZE XR 5-1000 MG TB24 daily  2/21/19  Yes Historical Provider, MD franksidone (MYSOLINE) 50 MG tablet 50 mg Take 3 tablets in the morning and 3 tablets in the evening 3/10/19  Yes Historical Provider, MD   warfarin (COUMADIN) 2 MG tablet daily Takes 2 tabs daily 7/8/20   Historical Provider, MD   warfarin (COUMADIN) 5 MG tablet take 1 tablet by mouth once daily 6/24/20   Historical Provider, MD   ferrous sulfate (IRON 325) 325 (65 Fe) MG tablet Take 1 tablet by mouth daily 6/2/20 8/1/20  Dawit Orosco MD   Multiple Vitamins-Minerals (MULTIVITAMIN ADULT PO) Take 1 tablet by mouth    Historical Provider, MD   OMEGA-3 FATTY ACIDS-VITAMIN E PO Take 1 capsule by mouth daily     Historical Provider, MD MANUEL OMEPRAZOLE 20 MG EC tablet Take 20 mg by mouth daily  2/1/19   Historical Provider, MD       Review of Systems:  Constitutional:negative  for fevers, and negative for chills. Eyes: negative for visual disturbance   ENT: negative for sore throat, negative nasal congestion, and negative for earache  Respiratory: negative for shortness of breath, negative for cough, and negative for wheezing  Cardiovascular: negative for chest pain, negative for palpitations, and negative for syncope  Gastrointestinal: negative for abdominal pain, negative for nausea,negative for vomiting, negative for diarrhea, negative for constipation, and negative for hematochezia or melena  Genitourinary: negative for dysuria, negative for urinary urgency, negative for urinary frequency, and negative for hematuria  Skin: negative for skin rash, and negative for skin lesions  Neurological: negative for unilateral weakness, numbness or tingling. Physical Exam:    Vitals:   Temp: 98.8 °F (37.1 °C)  BP: (!) 142/70  Resp: 20  Pulse: 100  SpO2: 94 %  24HR INTAKE/OUTPUT:  No intake or output data in the 24 hours ending 02/01/22 1504    Weight    Body mass index is 25.06 kg/m². Exam:  GEN:    Awake, alert and oriented x3. EYES:  EOMI, pupils equal   NECK: Supple. No lymphadenopathy.   No carotid bruit  CVS:    regular rate and rhythm, systolic murmur  PULM:  CTA, no wheezes, rales or rhonchi, no acute respiratory distress  ABD:    Bowels sounds normal.  Abdomen is soft. No distention. no tenderness to palpation. : phillips red with 3 way catheter to CBI  EXT:   no edema bilaterally . No calf tenderness. NEURO: Moves all extremities. Motor and sensory are grossly intact  SKIN:  No rashes. No skin lesions.    -----------------------------------------------------------------  Diagnostic Data:     DATA:    CBC:   Lab Results   Component Value Date    WBC 6.5 09/27/2021    RBC 5.59 09/27/2021    HGB 15.5 09/27/2021    HCT 47.5 09/27/2021    MCV 85.0 09/27/2021    PLT See Reflexed IPF Result 09/27/2021        CMP:   Lab Results   Component Value Date    GLUCOSE 123 (H) 09/27/2021    BUN 26 (H) 01/13/2022    CREATININE 1.05 01/13/2022     (L) 09/27/2021    K 4.1 09/27/2021    CALCIUM 9.8 09/27/2021    CL 97 (L) 09/27/2021    CO2 24 09/27/2021    PROT 7.7 09/27/2021    LABALBU 4.8 09/27/2021    BILITOT 0.53 09/27/2021    ALKPHOS 87 09/27/2021    ALT 22 09/27/2021    AST 28 09/27/2021       UA:   Lab Results   Component Value Date    COLORU YELLOW 09/16/2020    SPECGRAV 1.015 09/16/2020    WBCUA None 09/16/2020    RBCUA None 09/16/2020    EPITHUA 0 TO 2 09/16/2020    LEUKOCYTESUR NEGATIVE 09/16/2020    GLUCOSEU NEGATIVE 09/16/2020    KETUA NEGATIVE 09/16/2020    PROTEINU NEGATIVE 09/16/2020    HGBUR NEGATIVE 09/16/2020    CASTUA NOT REPORTED 09/16/2020    CRYSTUA NOT REPORTED 09/16/2020    BACTERIA NOT REPORTED 09/16/2020    YEAST NOT REPORTED 09/16/2020       Lactic Acid:   No results found for: LACTA    D-Dimer:  No results found for: DDIMER    PT/INR:  Lab Results   Component Value Date    PROTIME 24.3 01/13/2022    INR 2.3 01/13/2022       High Sensitivity Troponin:  No results for input(s): TROPHS in the last 72 hours.     ABGs:   No results found for: PHART, PH, DCN2CVG, PCO2, PO2ART, PO2, CXF3VHL, HCO3, BEART, BE, THGBART, THB, BPT9CQM, R1MAVDVH, O2SAT, FIO2        No orders to display         EKG reviewed    Assessment:    Active Problems:    Gross hematuria    Hypertension  Resolved Problems:    * No resolved hospital problems. *      Patient Active Problem List    Diagnosis Date Noted    Gross hematuria 02/01/2022    Hypertension     Microhematuria 09/16/2020    Erectile dysfunction 09/16/2020    Myeloproliferative disorder ([facility] [address] 03/21/2019    Polycythemia rubra vera ([facility] [address] 03/21/2019    Hypercoagulable state ([facility] 03/21/2019       Plan:     · This patient requires inpatient admission because of gross hematuria  · Factors affecting the medical complexity of this patient include hypertension  · Estimated length of stay is 3 days  · Gross hematuria  · Appreciate urology  · Three-way bladder irrigation  · Continue Flomax  · Continue IV fluids  · Trend labs  · Cystoscopy-1/31/2022-normal mucosa without lesions of the bladder however had extensive try lobular hyperplasia of the prostate  · Hypertension  · Continue lisinopril-HCTZ  · DVT prophylaxis: NO chemical ppx due to active bleed  · Peptic ulcer prophylaxis: Pepcid  · High risk medications: none  · Social Service and Case Management consults for DC planning  · Dietician consult initiated    CORE MEASURES  DVT prophylaxis: NO chemical ppx due to active bleed  Decubitus ulcer present on admission: No  CODE STATUS: FULL CODE  Nutrition Status: good   Physical therapy: No   Old Charts reviewed: Yes  EKG Reviewed:  Yes  Advance Directive Addressed: Yes    LILIBETH Lewis - CNP, LILIBETH, NP-C  2/1/2022, 3:04 PM

## 2022-02-02 LAB
ABSOLUTE EOS #: 0.08 K/UL (ref 0–0.44)
ABSOLUTE IMMATURE GRANULOCYTE: 0 K/UL (ref 0–0.3)
ABSOLUTE LYMPH #: 1 K/UL (ref 1.1–3.7)
ABSOLUTE MONO #: 1.31 K/UL (ref 0.1–1.2)
ANION GAP SERPL CALCULATED.3IONS-SCNC: 13 MMOL/L (ref 9–17)
BASOPHILS # BLD: 0 % (ref 0–2)
BASOPHILS ABSOLUTE: 0 K/UL (ref 0–0.2)
BUN BLDV-MCNC: 37 MG/DL (ref 8–23)
BUN/CREAT BLD: 26 (ref 9–20)
CALCIUM SERPL-MCNC: 8.2 MG/DL (ref 8.6–10.4)
CHLORIDE BLD-SCNC: 100 MMOL/L (ref 98–107)
CO2: 22 MMOL/L (ref 20–31)
CREAT SERPL-MCNC: 1.42 MG/DL (ref 0.7–1.2)
DIFFERENTIAL TYPE: ABNORMAL
EOSINOPHILS RELATIVE PERCENT: 1 % (ref 1–4)
GFR AFRICAN AMERICAN: 60 ML/MIN
GFR NON-AFRICAN AMERICAN: 49 ML/MIN
GFR SERPL CREATININE-BSD FRML MDRD: ABNORMAL ML/MIN/{1.73_M2}
GFR SERPL CREATININE-BSD FRML MDRD: ABNORMAL ML/MIN/{1.73_M2}
GLUCOSE BLD-MCNC: 169 MG/DL (ref 70–99)
HCT VFR BLD CALC: 32.1 % (ref 40.7–50.3)
HCT VFR BLD CALC: 32.3 % (ref 40.7–50.3)
HEMOGLOBIN: 10.6 G/DL (ref 13–17)
HEMOGLOBIN: 10.6 G/DL (ref 13–17)
IMMATURE GRANULOCYTES: 0 %
INR BLD: 1.6
LYMPHOCYTES # BLD: 13 % (ref 24–43)
MCH RBC QN AUTO: 26.7 PG (ref 25.2–33.5)
MCHC RBC AUTO-ENTMCNC: 33 G/DL (ref 28.4–34.8)
MCV RBC AUTO: 80.9 FL (ref 82.6–102.9)
MONOCYTES # BLD: 17 % (ref 3–12)
MORPHOLOGY: NORMAL
NRBC AUTOMATED: 1.2 PER 100 WBC
PDW BLD-RTO: 18.3 % (ref 11.8–14.4)
PLATELET # BLD: 129 K/UL (ref 138–453)
PLATELET ESTIMATE: ABNORMAL
PMV BLD AUTO: 10 FL (ref 8.1–13.5)
POTASSIUM SERPL-SCNC: 4 MMOL/L (ref 3.7–5.3)
PROTHROMBIN TIME: 18.6 SEC (ref 11.5–14.2)
RBC # BLD: 3.97 M/UL (ref 4.21–5.77)
RBC # BLD: ABNORMAL 10*6/UL
SEG NEUTROPHILS: 69 % (ref 36–65)
SEGMENTED NEUTROPHILS ABSOLUTE COUNT: 5.31 K/UL (ref 1.5–8.1)
SODIUM BLD-SCNC: 135 MMOL/L (ref 135–144)
WBC # BLD: 7.7 K/UL (ref 3.5–11.3)
WBC # BLD: ABNORMAL 10*3/UL

## 2022-02-02 PROCEDURE — 6370000000 HC RX 637 (ALT 250 FOR IP): Performed by: NURSE PRACTITIONER

## 2022-02-02 PROCEDURE — 2580000003 HC RX 258: Performed by: NURSE PRACTITIONER

## 2022-02-02 PROCEDURE — 85025 COMPLETE CBC W/AUTO DIFF WBC: CPT

## 2022-02-02 PROCEDURE — 85014 HEMATOCRIT: CPT

## 2022-02-02 PROCEDURE — 36415 COLL VENOUS BLD VENIPUNCTURE: CPT

## 2022-02-02 PROCEDURE — 80048 BASIC METABOLIC PNL TOTAL CA: CPT

## 2022-02-02 PROCEDURE — 94761 N-INVAS EAR/PLS OXIMETRY MLT: CPT

## 2022-02-02 PROCEDURE — 1200000000 HC SEMI PRIVATE

## 2022-02-02 PROCEDURE — 85018 HEMOGLOBIN: CPT

## 2022-02-02 PROCEDURE — 85610 PROTHROMBIN TIME: CPT

## 2022-02-02 RX ADMIN — SODIUM CHLORIDE: 9 INJECTION, SOLUTION INTRAVENOUS at 00:26

## 2022-02-02 RX ADMIN — FERROUS SULFATE TAB 325 MG (65 MG ELEMENTAL FE) 325 MG: 325 (65 FE) TAB at 08:25

## 2022-02-02 RX ADMIN — LISINOPRIL AND HYDROCHLOROTHIAZIDE 1 TABLET: 25; 20 TABLET ORAL at 08:25

## 2022-02-02 RX ADMIN — METFORMIN HYDROCHLORIDE 1000 MG: 500 TABLET, EXTENDED RELEASE ORAL at 08:25

## 2022-02-02 RX ADMIN — PRIMIDONE 150 MG: 50 TABLET ORAL at 20:22

## 2022-02-02 RX ADMIN — PANTOPRAZOLE SODIUM 40 MG: 40 TABLET, DELAYED RELEASE ORAL at 06:43

## 2022-02-02 RX ADMIN — ALOGLIPTIN 25 MG: 25 TABLET, FILM COATED ORAL at 08:24

## 2022-02-02 RX ADMIN — PRIMIDONE 150 MG: 50 TABLET ORAL at 08:24

## 2022-02-02 RX ADMIN — SODIUM CHLORIDE: 9 INJECTION, SOLUTION INTRAVENOUS at 15:30

## 2022-02-02 RX ADMIN — TAMSULOSIN HYDROCHLORIDE 0.4 MG: 0.4 CAPSULE ORAL at 08:25

## 2022-02-02 ASSESSMENT — PAIN SCALES - GENERAL
PAINLEVEL_OUTOF10: 0

## 2022-02-02 NOTE — PROGRESS NOTES
CBI not flowing while wide open, after several attempts to irrigate and several small clots were returned, CBI begins flowing again. 325 of NS irrigation returned. Patient tolerated well.

## 2022-02-02 NOTE — PROGRESS NOTES
Patient is awake, CBI still cherry red, irrigated at this time for small clots. Patient assessment and vitals completed, see charting on all. BP slightly low, denies any symptoms otherwise assessment unchanged.

## 2022-02-02 NOTE — PROGRESS NOTES
UROLOGY PROGRESS NOTE    Patient:  Marie Remy  MRN: [id number]      Subjective:   Hematuria, clot retention    Objective: We are following this patient for urinary retention secondary to clots. Patient has had to be flushed/irrigated multiple times with minimal amounts of fluids overnight. Patient continues on CBI. Patient is normotensive. He has normal sinus rhythm. He states he is having bowel movements. He is tolerating diet well. He denies being uncomfortable. INR this morning was 1.6. Creatinine 1.42. Hemoglobin was 10.6. REVIEW OF SYSTEMS:  Constitutional: Negative for fever, chills and unexpected weight change. Respiratory: Negative for shortness of breath and wheezing. Cardiovascular: Negative for chest pain and palpitations. Gastrointestinal: Negative for nausea vomiting, diarrhea  Endocrine: Negative for polydipsia and polyuria. Genitourinary:Positive for hematuria  Musculoskeletal: Negative for myalgias and joint swelling. Skin: Negative for rash and wound. Neurological: Negative for dizziness and headaches. Hematological: Negative for adenopathy. Does not bruise/bleed easily. PHYSICAL EXAM:  Constitutional: Patient resting comfortably, in no acute distress. Neuro: Alert and oriented to person place and time. Psych: Mood and affect normal.  HEENT: normocephalic, atraumatic  Lungs: Respiratory effort normal, unlabored  Abdomen: Soft, non-tender, non-distended  : Prather catheter in place.   Extremities: Calves are non-tender    Labs:  Recent Labs     02/02/22  0535   WBC 7.7   HGB 10.6*   HCT 32.1*   MCV 80.9*   *     Recent Labs     02/02/22  0535      K 4.0      CO2 22   BUN 37*   CREATININE 1.42*     Lab Results   Component Value Date    PSA 2.75 11/03/2021    PSA 2.61 09/18/2020       Urinalysis: No results for input(s): COLORU, PHUR, LABCAST, WBCUA, RBCUA, MUCUS, TRICHOMONAS, YEAST, BACTERIA, CLARITYU, SPECGRAV, LEUKOCYTESUR, UROBILINOGEN, Brittany Alex in the last 72 hours. Invalid input(s): NITRATE, GLUCOSEUKETONESUAMORPHOUS       ASSESSMENT AND PLAN:  Impression:    Patient Active Problem List   Diagnosis    Myeloproliferative disorder ([address]    Polycythemia rubra vera ([address]    Hypercoagulable state ([address]    Microhematuria    Erectile dysfunction    Gross hematuria    Hypertension       Plan: At bedside I did irrigate his bladder with 4 L of normal saline. There was multiple midsized clots and small ones. By the end of the 4th liter no further clots obtained. Continue CBI    H&H at 2 PM    SCDs    Hold Coumadin    Continue Flomax    Irrigate Prather catheter with 250 to 500 mL of normal saline as needed for clots. We did have a long discussion with the patient in regards to expectations. We do need to continue CBI and he will need to go home with a Prather catheter in place. We do need to give this time for this to resolve.   We do not feel it is needed at this time to take him to the operating room for evaluation.    ------------------------------------------------  I have discussed the care of this patient including pertinent history and exam findings, lab and imaging results, assessment, orders and plan as documented above with Emilia Wolfe MD.    Electronically signed by Deepthi Tim PA-C on 2/2/2022 at 1:35 PM

## 2022-02-02 NOTE — PROGRESS NOTES
Patients phillips clotted off. Irrigated with 60cc of sterile water. Several large clots were returned and CBI begins flowing again. Dark red urine noted.

## 2022-02-02 NOTE — PROGRESS NOTES
Vitals and assessment done. Patient is A&Ox4. CBI continues to run wide open, urine is cherry red. Patient denies any pain or dizziness.

## 2022-02-02 NOTE — PROGRESS NOTES
Comprehensive Nutrition Assessment    Type and Reason for Visit:  Initial (missing malnutrion screen )    Nutrition Recommendations/Plan: continue current diet    Nutrition Assessment:  Altered nutrition related labs r/t endocrine dysfunction aeb A1c 6.3. Glucose 169. Pt with hematuria. Pt denied any PO changes. States he eats 3 meals each day and reads food labels. He works 4 hours each day and reports it helps his blood sugars. 's. Malnutrition Assessment:  Malnutrition Status: At risk for malnutrition (Comment)    Context:  Acute Illness     Findings of the 6 clinical characteristics of malnutrition:  Energy Intake:  No significant decrease in energy intake  Weight Loss:  7 - Greater than 2% over 1 week (3% weight loss x 3 days)     Body Fat Loss:  No significant body fat loss     Muscle Mass Loss:  No significant muscle mass loss    Fluid Accumulation:  No significant fluid accumulation     Strength:  Not Performed    Estimated Daily Nutrient Needs:  Energy (kcal):  8075-8943 (20-23/kg); Weight Used for Energy Requirements:  Current     Protein (g):  94-109g (1.2-1.4g/kg); Weight Used for Protein Requirements:  Ideal        Fluid (ml/day):  2050 ml (25 ml/kg); Method Used for Fluid Requirements:  ml/Kg      Nutrition Related Findings:  appears well nourished      Wounds:  None       Current Nutrition Therapies:    ADULT DIET; Regular; 4 carb choices (60 gm/meal)    Anthropometric Measures:  · Height: 5' 11\" (180.3 cm)  · Current Body Weight: 181 lb 3.2 oz (82.2 kg)   · Admission Body Weight: 179 lb 11.2 oz (81.5 kg)    · Usual Body Weight: 187 lb (84.8 kg)     · Ideal Body Weight: 172 lbs; % Ideal Body Weight 105.3 %   · BMI: 25.3  · BMI Categories: Overweight (BMI 25.0-29. 9)       Nutrition Diagnosis:   · Altered nutrition-related lab values related to endocrine dysfuntion as evidenced by lab values      Nutrition Interventions:   Food and/or Nutrient Delivery:  Continue Current Diet  Nutrition Education/Counseling:  No recommendation at this time   Coordination of Nutrition Care:  Continue to monitor while inpatient    Goals:  PO > 75% of meals        Recent Labs     02/02/22  0535      K 4.0      CO2 22   BUN 37*   CREATININE 1.42*   GLUCOSE 169*   GFR                 Lab Results   Component Value Date    LABALBU 4.8 09/27/2021      Nutrition Monitoring and Evaluation:   Behavioral-Environmental Outcomes:  None Identified   Food/Nutrient Intake Outcomes:  Food and Nutrient Intake  Physical Signs/Symptoms Outcomes:  Biochemical Data,Weight     Discharge Planning:    Continue current diet     Electronically signed by Missy Camarillo RD, LD on 2/2/22 at 1:18 PM EST    Contact: [phone]

## 2022-02-02 NOTE — PROGRESS NOTES
Patient called out and said he was having some discomfort. Nurse to room and CBI not infusing. Patient irrigated for multiple small clots, hooked back up and draining adequately. Patient urine is cherry red and CBI is wide open at this time. Patient assessment and vitals completed, see charting on all. Patient lungs clear, he is on RA.   Patient with call light in reach, denies further needs

## 2022-02-02 NOTE — PLAN OF CARE
Dr. Ashley Aguirreutz informed of continued hematuria and continued wide open CBI (approximately 16-3 Liter bags of irrigation used during night). Dr. Pranav Stanford was informed of INR at [facility] of 2.7 and indiated wanting recheck this AM.  Kaz Danielson also informed. Waiting on CBC and INR results.

## 2022-02-02 NOTE — PROGRESS NOTES
Progress Note    SUBJECTIVE:    Patient seen for f/u of Gross hematuria. He sitting up in the chair alert and oriented in no distress. Afebrile. Continues to have gross hematuria. Nursing reports continued bleeding and used 6L of solution through the night. ROS:   Constitutional: negative  for fevers, and negative for chills. Respiratory: negative for shortness of breath, negative for cough, and negative for wheezing  Cardiovascular: negative for chest pain, and negative for palpitations  Gastrointestinal: negative for abdominal pain, negative for nausea,negative for vomiting, negative for diarrhea, and negative for constipation     All other systems were reviewed with the patient and are negative unless otherwise stated in HPI      OBJECTIVE:      Vitals:   Vitals:    02/02/22 0636   BP: 108/68   Pulse: 80   Resp: 16   Temp: 96.8 °F (36 °C)   SpO2: 96%     Weight: 181 lb 3.2 oz (82.2 kg)   Height: 5' 11\" (180.3 cm)     Weight  Wt Readings from Last 3 Encounters:   02/02/22 181 lb 3.2 oz (82.2 kg)   01/31/22 187 lb (84.8 kg)   01/13/22 183 lb (83 kg)     Body mass index is 25.27 kg/m². 24HR INTAKE/OUTPUT:      Intake/Output Summary (Last 24 hours) at 2/2/2022 0904  Last data filed at 2/2/2022 0851  Gross per 24 hour   Intake 1750.9 ml   Output -1000 ml   Net 2750.9 ml     -----------------------------------------------------------------  Exam:    GEN:    Awake, alert and oriented x3. EYES:   EOMI, pupils equal   NECK: Supple. No lymphadenopathy. No carotid bruit  CVS:     regular rate and rhythm, systolic murmur  PULM:  CTA, no wheezes, rales or rhonchi, no acute respiratory distress  ABD:     Bowels sounds normal.  Abdomen is soft. No distention. no tenderness to palpation. : phillips red with 3 way catheter to CBI  EXT:     no edema bilaterally . No calf tenderness. NEURO: Moves all extremities. Motor and sensory are grossly intact  SKIN:    No rashes. No skin lesions. -----------------------------------------------------------------    Diagnostic Data:      Complete Blood Count:   Recent Labs     02/02/22  0535   WBC 7.7   RBC 3.97*   HGB 10.6*   HCT 32.1*   MCV 80.9*   MCH 26.7   MCHC 33.0   RDW 18.3*   *   MPV 10.0        Last 3 Blood Glucose:   Recent Labs     02/02/22  0535   GLUCOSE 169*        Comprehensive Metabolic Profile:   Recent Labs     02/02/22  0535      K 4.0      CO2 22   BUN 37*   CREATININE 1.42*   GLUCOSE 169*   CALCIUM 8.2*        Urinalysis:   Lab Results   Component Value Date    NITRU NEGATIVE 09/16/2020    COLORU YELLOW 09/16/2020    PHUR 6.0 09/16/2020    WBCUA None 09/16/2020    RBCUA None 09/16/2020    MUCUS NOT REPORTED 09/16/2020    TRICHOMONAS NOT REPORTED 09/16/2020    YEAST NOT REPORTED 09/16/2020    BACTERIA NOT REPORTED 09/16/2020    SPECGRAV 1.015 09/16/2020    LEUKOCYTESUR NEGATIVE 09/16/2020    UROBILINOGEN Normal 09/16/2020    BILIRUBINUR NEGATIVE 09/16/2020    GLUCOSEU NEGATIVE 09/16/2020    KETUA NEGATIVE 09/16/2020    AMORPHOUS NOT REPORTED 09/16/2020       HgBA1c:    Lab Results   Component Value Date    LABA1C 6.3 08/03/2021       Lactic Acid: No results found for: LACTA     Troponin: No results for input(s): TROPONINI in the last 72 hours. CRP:  No results for input(s): CRP in the last 72 hours. Radiology/Imaging:  No orders to display         ASSESSMENT / PLAN:  Gross hematuria  · Continue current therapy   · Appreciate urology  · Trend labs  · CBI  · Decrease IV fluids  ? Continue Flomax   ?  Cystoscopy-1/31/2022-normal mucosa without lesions of the bladder however had extensive try lobular hyperplasia of the prostate  · Hypertension  · Continue lisinopril-HCTZ  · Nutrition status:   · Well developed, well nourished with no malnutrition  · Dietician consult initiated  · Hospital Prophylaxis:   · DVT: none due to Active bleed   · Stress Ulcer: H2 Blocker   · High risk medications: none   · Disposition: · Discharge plan is pending      LILIBETH Lee - CNP , LILIBETH, NP-C  Hospitalist Medicine        2/2/2022, 9:04 AM

## 2022-02-03 LAB
ABSOLUTE EOS #: 0.07 K/UL (ref 0–0.44)
ABSOLUTE IMMATURE GRANULOCYTE: 0.07 K/UL (ref 0–0.3)
ABSOLUTE LYMPH #: 1.31 K/UL (ref 1.1–3.7)
ABSOLUTE MONO #: 0.73 K/UL (ref 0.1–1.2)
ANION GAP SERPL CALCULATED.3IONS-SCNC: 16 MMOL/L (ref 9–17)
BASOPHILS # BLD: 1 % (ref 0–2)
BASOPHILS ABSOLUTE: 0.07 K/UL (ref 0–0.2)
BUN BLDV-MCNC: 30 MG/DL (ref 8–23)
BUN/CREAT BLD: 26 (ref 9–20)
CALCIUM SERPL-MCNC: 8.4 MG/DL (ref 8.6–10.4)
CHLORIDE BLD-SCNC: 100 MMOL/L (ref 98–107)
CO2: 23 MMOL/L (ref 20–31)
CREAT SERPL-MCNC: 1.14 MG/DL (ref 0.7–1.2)
DIFFERENTIAL TYPE: ABNORMAL
EOSINOPHILS RELATIVE PERCENT: 1 % (ref 1–4)
GFR AFRICAN AMERICAN: >60 ML/MIN
GFR NON-AFRICAN AMERICAN: >60 ML/MIN
GFR SERPL CREATININE-BSD FRML MDRD: ABNORMAL ML/MIN/{1.73_M2}
GFR SERPL CREATININE-BSD FRML MDRD: ABNORMAL ML/MIN/{1.73_M2}
GLUCOSE BLD-MCNC: 141 MG/DL (ref 70–99)
HCT VFR BLD CALC: 29 % (ref 40.7–50.3)
HEMOGLOBIN: 9.3 G/DL (ref 13–17)
IMMATURE GRANULOCYTES: 1 %
INR BLD: 1.3
LYMPHOCYTES # BLD: 18 % (ref 24–43)
MCH RBC QN AUTO: 26.3 PG (ref 25.2–33.5)
MCHC RBC AUTO-ENTMCNC: 32.1 G/DL (ref 28.4–34.8)
MCV RBC AUTO: 82.2 FL (ref 82.6–102.9)
MONOCYTES # BLD: 10 % (ref 3–12)
MORPHOLOGY: NORMAL
NRBC AUTOMATED: 0.8 PER 100 WBC
NUCLEATED RED BLOOD CELLS: 2 PER 100 WBC (ref 0–5)
PDW BLD-RTO: 18.3 % (ref 11.8–14.4)
PLATELET # BLD: 105 K/UL (ref 138–453)
PLATELET ESTIMATE: ABNORMAL
PMV BLD AUTO: 9.1 FL (ref 8.1–13.5)
POTASSIUM SERPL-SCNC: 4.4 MMOL/L (ref 3.7–5.3)
PROTHROMBIN TIME: 15.6 SEC (ref 11.5–14.2)
RBC # BLD: 3.53 M/UL (ref 4.21–5.77)
RBC # BLD: ABNORMAL 10*6/UL
SEG NEUTROPHILS: 69 % (ref 36–65)
SEGMENTED NEUTROPHILS ABSOLUTE COUNT: 5 K/UL (ref 1.5–8.1)
SODIUM BLD-SCNC: 139 MMOL/L (ref 135–144)
WBC # BLD: 7.3 K/UL (ref 3.5–11.3)
WBC # BLD: ABNORMAL 10*3/UL

## 2022-02-03 PROCEDURE — 36415 COLL VENOUS BLD VENIPUNCTURE: CPT

## 2022-02-03 PROCEDURE — 80048 BASIC METABOLIC PNL TOTAL CA: CPT

## 2022-02-03 PROCEDURE — 1200000000 HC SEMI PRIVATE

## 2022-02-03 PROCEDURE — 85025 COMPLETE CBC W/AUTO DIFF WBC: CPT

## 2022-02-03 PROCEDURE — 6370000000 HC RX 637 (ALT 250 FOR IP): Performed by: NURSE PRACTITIONER

## 2022-02-03 PROCEDURE — 94761 N-INVAS EAR/PLS OXIMETRY MLT: CPT

## 2022-02-03 PROCEDURE — 85610 PROTHROMBIN TIME: CPT

## 2022-02-03 RX ADMIN — PRIMIDONE 150 MG: 50 TABLET ORAL at 20:23

## 2022-02-03 RX ADMIN — METFORMIN HYDROCHLORIDE 1000 MG: 500 TABLET, EXTENDED RELEASE ORAL at 08:28

## 2022-02-03 RX ADMIN — ACETAMINOPHEN 650 MG: 325 TABLET ORAL at 10:30

## 2022-02-03 RX ADMIN — TAMSULOSIN HYDROCHLORIDE 0.4 MG: 0.4 CAPSULE ORAL at 08:28

## 2022-02-03 RX ADMIN — PANTOPRAZOLE SODIUM 40 MG: 40 TABLET, DELAYED RELEASE ORAL at 06:52

## 2022-02-03 RX ADMIN — FERROUS SULFATE TAB 325 MG (65 MG ELEMENTAL FE) 325 MG: 325 (65 FE) TAB at 08:28

## 2022-02-03 RX ADMIN — LISINOPRIL AND HYDROCHLOROTHIAZIDE 1 TABLET: 25; 20 TABLET ORAL at 08:28

## 2022-02-03 RX ADMIN — PRIMIDONE 150 MG: 50 TABLET ORAL at 08:28

## 2022-02-03 RX ADMIN — ALOGLIPTIN 25 MG: 25 TABLET, FILM COATED ORAL at 08:28

## 2022-02-03 ASSESSMENT — PAIN SCALES - GENERAL
PAINLEVEL_OUTOF10: 4
PAINLEVEL_OUTOF10: 0

## 2022-02-03 NOTE — PROGRESS NOTES
Progress Note    SUBJECTIVE:    Patient seen for f/u of Gross hematuria. He resting in bed alert and oriented in no distress. Afebrile. Prather catheter irrigated twice through the night. Currently patent with less bleeding    ROS:   Constitutional: negative  for fevers, and negative for chills. Respiratory: negative for shortness of breath, negative for cough, and negative for wheezing  Cardiovascular: negative for chest pain, and negative for palpitations  Gastrointestinal: negative for abdominal pain, negative for nausea,negative for vomiting, negative for diarrhea, and negative for constipation     All other systems were reviewed with the patient and are negative unless otherwise stated in HPI      OBJECTIVE:      Vitals:   Vitals:    02/03/22 0648   BP: (!) 139/51   Pulse: 86   Resp: 16   Temp: 96.8 °F (36 °C)   SpO2: 97%     Weight: 185 lb 4.8 oz (84.1 kg)   Height: 5' 11\" (180.3 cm)     Weight  Wt Readings from Last 3 Encounters:   02/03/22 185 lb 4.8 oz (84.1 kg)   01/31/22 187 lb (84.8 kg)   01/13/22 183 lb (83 kg)     Body mass index is 25.84 kg/m². 24HR INTAKE/OUTPUT:      Intake/Output Summary (Last 24 hours) at 2/3/2022 0833  Last data filed at 2/3/2022 0733  Gross per 24 hour   Intake 1845 ml   Output -7575 ml   Net 9420 ml     -----------------------------------------------------------------  Exam:    GEN:    Awake, alert and oriented x3. EYES:   EOMI, pupils equal   NECK: Supple. No lymphadenopathy. No carotid bruit  CVS:     regular rate and rhythm, systolic murmur  PULM:  CTA, no wheezes, rales or rhonchi, no acute respiratory distress  ABD:     Bowels sounds normal.  Abdomen is soft. No distention. no tenderness to palpation. : light cherry red with 3 way catheter to CBI  EXT:     no edema bilaterally . No calf tenderness. NEURO: Moves all extremities. Motor and sensory are grossly intact  SKIN:    No rashes. No skin lesions. -----------------------------------------------------------------    Diagnostic Data:      Complete Blood Count:   Recent Labs     02/02/22  0535 02/02/22  1410 02/03/22  0540   WBC 7.7  --  7.3   RBC 3.97*  --  3.53*   HGB 10.6* 10.6* 9.3*   HCT 32.1* 32.3* 29.0*   MCV 80.9*  --  82.2*   MCH 26.7  --  26.3   MCHC 33.0  --  32.1   RDW 18.3*  --  18.3*   *  --  105*   MPV 10.0  --  9.1        Last 3 Blood Glucose:   Recent Labs     02/02/22  0535 02/03/22  0540   GLUCOSE 169* 141*        Comprehensive Metabolic Profile:   Recent Labs     02/02/22  0535 02/03/22  0540    139   K 4.0 4.4    100   CO2 22 23   BUN 37* 30*   CREATININE 1.42* 1.14   GLUCOSE 169* 141*   CALCIUM 8.2* 8.4*        Urinalysis:   Lab Results   Component Value Date    NITRU NEGATIVE 09/16/2020    COLORU YELLOW 09/16/2020    PHUR 6.0 09/16/2020    WBCUA None 09/16/2020    RBCUA None 09/16/2020    MUCUS NOT REPORTED 09/16/2020    TRICHOMONAS NOT REPORTED 09/16/2020    YEAST NOT REPORTED 09/16/2020    BACTERIA NOT REPORTED 09/16/2020    SPECGRAV 1.015 09/16/2020    LEUKOCYTESUR NEGATIVE 09/16/2020    UROBILINOGEN Normal 09/16/2020    BILIRUBINUR NEGATIVE 09/16/2020    GLUCOSEU NEGATIVE 09/16/2020    KETUA NEGATIVE 09/16/2020    AMORPHOUS NOT REPORTED 09/16/2020       HgBA1c:    Lab Results   Component Value Date    LABA1C 6.3 08/03/2021       Lactic Acid: No results found for: LACTA     Troponin: No results for input(s): TROPONINI in the last 72 hours. CRP:  No results for input(s): CRP in the last 72 hours. Radiology/Imaging:  No orders to display         ASSESSMENT / PLAN:  Gross hematuria  · Continue current therapy   · Appreciate urology  · Trend labs-Hbg continues to decrease. INR stable  · Continue CBI  · Decrease IV fluids to 50 ml/hr - will continue due to continued bleeding. Renal function improved  ? Continue Flomax   ?  Cystoscopy-1/31/2022-normal mucosa without lesions of the bladder however had extensive try lobular hyperplasia of the prostate  · Hypertension  · Continue lisinopril-HCTZ  · Nutrition status:   · Well developed, well nourished with no malnutrition  · Dietician consult initiated  · Hospital Prophylaxis:   · DVT: none due to Active bleed   · Stress Ulcer: H2 Blocker   · High risk medications: none   · Disposition:    · Discharge plan is pending      LILIBETH Ramos - CNP , LILIBETH, NP-C  Hospitalist Medicine        2/3/2022, 8:33 AM

## 2022-02-03 NOTE — PROGRESS NOTES
Vitals and assessment done. Patient A&O and on room air. CBI is running wide open at this time, urine remains cherry red with small clots in it. Denies any pain currently. Denies any other needs.

## 2022-02-03 NOTE — PROGRESS NOTES
Patient called out staying he was having discomfort in his bladder. CBI was slowly dripping. 500cc of NS for irrigation was irrigated. A large amount of clots were returned and it took several attempts to clear the blockage. Drainage is now draining dark red but CBI is flowing as it should. Patient was given prn tylenol for discomfort.

## 2022-02-03 NOTE — PROGRESS NOTES
New bag for CBI hung at this time. Client tolerating irrigation therapy well, with no pain or signs of complications. 2950 ml out put. Call light and belongings within reach. Client is now resting in bed with eyes closed. Will continue to monitor.

## 2022-02-03 NOTE — PROGRESS NOTES
Vitals sign and physical assessment completed at this time. Upon assessment, clients urine appears cherry red, with CBI irrigation running. Pt expressed feelings of discomfort with assistance from RN, 325 ml piston syringe used to irrigate small clots from  Catheter. Pt relieved from treatment.

## 2022-02-03 NOTE — PROGRESS NOTES
Patient called out stating he was having discomfort. Catheter appeared to be clotted off again. Catheter was irrigated with 360cc and large amount of small clots were returned. Patient has pain with irrigation. CBI begins flowing again wide open. Denies any other needs.

## 2022-02-03 NOTE — PROGRESS NOTES
Dr Jad Cooper called for an update. Continue to run CBI and irrigate as needed. Repeat INR if that is good patient will be NPO at midnight if bleeding continues.

## 2022-02-03 NOTE — PROGRESS NOTES
Pt resting in bed watching t.v., assessment and vitals complete, CBI continues, draining clear pink urine at this time with small clots noted, all needs met, call light within reach, will continue to monitor.

## 2022-02-03 NOTE — PROGRESS NOTES
New bag of NS hung at this time for CBI. Patient currently resting in bed, with eyes closed. Re-assessment complete, vitals completed and documented. Call light, side table and belongings within reach. Will continue to monitor.

## 2022-02-04 LAB
ABSOLUTE EOS #: 0.11 K/UL (ref 0–0.44)
ABSOLUTE IMMATURE GRANULOCYTE: 0.23 K/UL (ref 0–0.3)
ABSOLUTE LYMPH #: 1.03 K/UL (ref 1.1–3.7)
ABSOLUTE MONO #: 0.63 K/UL (ref 0.1–1.2)
ANION GAP SERPL CALCULATED.3IONS-SCNC: 11 MMOL/L (ref 9–17)
BASOPHILS # BLD: 1 % (ref 0–2)
BASOPHILS ABSOLUTE: 0.06 K/UL (ref 0–0.2)
BUN BLDV-MCNC: 22 MG/DL (ref 8–23)
BUN/CREAT BLD: 22 (ref 9–20)
CALCIUM SERPL-MCNC: 8.7 MG/DL (ref 8.6–10.4)
CHLORIDE BLD-SCNC: 99 MMOL/L (ref 98–107)
CO2: 23 MMOL/L (ref 20–31)
CREAT SERPL-MCNC: 0.99 MG/DL (ref 0.7–1.2)
DIFFERENTIAL TYPE: ABNORMAL
EOSINOPHILS RELATIVE PERCENT: 2 % (ref 1–4)
GFR AFRICAN AMERICAN: >60 ML/MIN
GFR NON-AFRICAN AMERICAN: >60 ML/MIN
GFR SERPL CREATININE-BSD FRML MDRD: ABNORMAL ML/MIN/{1.73_M2}
GFR SERPL CREATININE-BSD FRML MDRD: ABNORMAL ML/MIN/{1.73_M2}
GLUCOSE BLD-MCNC: 154 MG/DL (ref 70–99)
HCT VFR BLD CALC: 29.9 % (ref 40.7–50.3)
HEMOGLOBIN: 9.5 G/DL (ref 13–17)
IMMATURE GRANULOCYTES: 4 %
INR BLD: 1.2
LYMPHOCYTES # BLD: 18 % (ref 24–43)
MCH RBC QN AUTO: 26.5 PG (ref 25.2–33.5)
MCHC RBC AUTO-ENTMCNC: 31.8 G/DL (ref 28.4–34.8)
MCV RBC AUTO: 83.3 FL (ref 82.6–102.9)
MONOCYTES # BLD: 11 % (ref 3–12)
MORPHOLOGY: ABNORMAL
NRBC AUTOMATED: 1.2 PER 100 WBC
PDW BLD-RTO: 18.1 % (ref 11.8–14.4)
PLATELET # BLD: ABNORMAL K/UL (ref 138–453)
PLATELET ESTIMATE: ABNORMAL
PLATELET, FLUORESCENCE: 106 K/UL (ref 138–453)
PLATELET, IMMATURE FRACTION: 6 % (ref 1.1–10.3)
PMV BLD AUTO: ABNORMAL FL (ref 8.1–13.5)
POTASSIUM SERPL-SCNC: 4.3 MMOL/L (ref 3.7–5.3)
PROTHROMBIN TIME: 14.9 SEC (ref 11.5–14.2)
RBC # BLD: 3.59 M/UL (ref 4.21–5.77)
RBC # BLD: ABNORMAL 10*6/UL
SEG NEUTROPHILS: 64 % (ref 36–65)
SEGMENTED NEUTROPHILS ABSOLUTE COUNT: 3.64 K/UL (ref 1.5–8.1)
SODIUM BLD-SCNC: 133 MMOL/L (ref 135–144)
WBC # BLD: 5.7 K/UL (ref 3.5–11.3)
WBC # BLD: ABNORMAL 10*3/UL

## 2022-02-04 PROCEDURE — 6370000000 HC RX 637 (ALT 250 FOR IP): Performed by: NURSE PRACTITIONER

## 2022-02-04 PROCEDURE — 1200000000 HC SEMI PRIVATE

## 2022-02-04 PROCEDURE — 2580000003 HC RX 258: Performed by: NURSE PRACTITIONER

## 2022-02-04 PROCEDURE — 36415 COLL VENOUS BLD VENIPUNCTURE: CPT

## 2022-02-04 PROCEDURE — 85610 PROTHROMBIN TIME: CPT

## 2022-02-04 PROCEDURE — 85025 COMPLETE CBC W/AUTO DIFF WBC: CPT

## 2022-02-04 PROCEDURE — 80048 BASIC METABOLIC PNL TOTAL CA: CPT

## 2022-02-04 PROCEDURE — 85055 RETICULATED PLATELET ASSAY: CPT

## 2022-02-04 PROCEDURE — 94761 N-INVAS EAR/PLS OXIMETRY MLT: CPT

## 2022-02-04 RX ADMIN — ACETAMINOPHEN 650 MG: 325 TABLET ORAL at 08:59

## 2022-02-04 RX ADMIN — PANTOPRAZOLE SODIUM 40 MG: 40 TABLET, DELAYED RELEASE ORAL at 08:57

## 2022-02-04 RX ADMIN — PRIMIDONE 150 MG: 50 TABLET ORAL at 21:50

## 2022-02-04 RX ADMIN — TAMSULOSIN HYDROCHLORIDE 0.4 MG: 0.4 CAPSULE ORAL at 08:54

## 2022-02-04 RX ADMIN — FERROUS SULFATE TAB 325 MG (65 MG ELEMENTAL FE) 325 MG: 325 (65 FE) TAB at 08:54

## 2022-02-04 RX ADMIN — PRIMIDONE 150 MG: 50 TABLET ORAL at 08:54

## 2022-02-04 RX ADMIN — LISINOPRIL AND HYDROCHLOROTHIAZIDE 1 TABLET: 25; 20 TABLET ORAL at 08:54

## 2022-02-04 RX ADMIN — ALOGLIPTIN 25 MG: 25 TABLET, FILM COATED ORAL at 08:54

## 2022-02-04 RX ADMIN — ACETAMINOPHEN 650 MG: 325 TABLET ORAL at 17:23

## 2022-02-04 RX ADMIN — METFORMIN HYDROCHLORIDE 1000 MG: 500 TABLET, EXTENDED RELEASE ORAL at 08:54

## 2022-02-04 RX ADMIN — SODIUM CHLORIDE: 9 INJECTION, SOLUTION INTRAVENOUS at 16:39

## 2022-02-04 ASSESSMENT — PAIN SCALES - GENERAL
PAINLEVEL_OUTOF10: 1
PAINLEVEL_OUTOF10: 0
PAINLEVEL_OUTOF10: 5
PAINLEVEL_OUTOF10: 6
PAINLEVEL_OUTOF10: 2
PAINLEVEL_OUTOF10: 0
PAINLEVEL_OUTOF10: 0

## 2022-02-04 NOTE — PROGRESS NOTES
Writer at bedside for shift assessment. Patient sitting up in bed, respirations are even and unlabored while on room air. Vitals obtained and assessment completed, see flowsheet for details. Pt phillips emptied at this time. pt denies further needs at this time. Call light in reach, will continue to monitor.

## 2022-02-04 NOTE — PROGRESS NOTES
Physician Progress Note      Cassidy Daigle  CSN #:                  [id number]  :                       1950  ADMIT DATE:       2022 11:17 AM  DISCH DATE:  RESPONDING  PROVIDER #:        Kyle Fleming MD          QUERY TEXT:    Pt admitted with urinary retention and gross hematuria on POD #1 s/p   Cystoscopy on . Pt also noted to be on long term anticoagulation for   hypercoagulable state secondary to Factor V Leiden, INR of 2.7 on   presentation. If possible, please document in progress notes and discharge   summary:      The medical record reflects the following:  Risk Factors: BPH, myeloproliferative syndrome likely Polycythemia Vera,   Factor V Leinden and Lupus anticoagulant, Long term anticoagulation. Clinical Indicators: Cysto preformed . , Pt presents to the ED with c/o   of inability to  urinate since procedure. Prior to transfer to [place], Prather   catheter was attempted multiple times before the decision was made to place a   3-way catheter by the ER physician. CBI initiated with hourly checks resulting   in the irrigation of blood clots and revealed the patients gross hematuria. As of 2/3: Pt continues to have bright red urine from his catheter. He   required 6L of irrigation solution through the night. HGB 9.3 from 15.2, INR   1.3, 1.6, 2.7. Treatment: CBI per orders from Urology, Pt to be discharged home with Prather,   Hold Coumadin, Trend H&H. Options provided:  -- Urinary retention due to blood clots and gross hematuria is a postoperative   complication of Cysto on   -- Urinary retention due to blood clots and gross hematuria s/p Cysto on ,   is not a postoperative complication, but is due long term anticoagulation   with Coumadin and hypercoagulable state  -- Urinary retention due to blood clots and gross hematuria s/p Cysto on    is due to, please specify.   -- Other - I will add my own diagnosis  -- Disagree - Not applicable / Not valid  -- Disagree - Clinically unable to determine / Unknown  -- Refer to Clinical Documentation Reviewer    PROVIDER RESPONSE TEXT:    Urinary retention due to blood clots and gross hematuria s/p Cysto on 1/31 is   not a postoperative complication, but due to is due long term anticoagulation   with Coumadin and hypercoagulable state. Query created by: Daniela Taylor on 2/3/2022 1:44 PM      QUERY TEXT:    Pt admitted with urinary retention and gross hematuria on s/p Cystoscopy on   1/31. On presentation, Hgb 15.2. Patient is on CBI with hematuria still   persisting. Hgb of 9.3 on 2/3. If possible, please document in progress notes   and discharge summary if the patient is being monitored or treated for: The medical record reflects the following:  Risk Factors: BPH, myeloproliferative syndrome likely Polycythemia Vera,   Factor V Leinden and Lupus anticoagulant, Long term anticoagulation. Clinical Indicators: Cysto preformed 1/31. 2/1, Pt presents to the ED with c/o   of inability to  urinate since procedure. Prior to transfer to [place], Prather   catheter was attempted multiple times before the decision was made to place a   3-way catheter by the ER physician. CBI initiated with hourly checks resulting   in the irrigation of blood clots and revealed the patients gross hematuria. As of 2/3: Pt continues to have bright red urine from his catheter. He   required 6L of irrigation solution through the night. HGB 9.3 from 15.2, INR   1.3, 1.6, 2.7. Treatment: CBI per orders from Urology, Pt to be discharged home with Prather,   Hold Coumadin, Trend H&H. Options provided:  -- Acute blood loss anemia  -- Postoperative acute blood loss anemia  -- Acute drop in Hgb due to, please specify.   -- Other - I will add my own diagnosis  -- Disagree - Not applicable / Not valid  -- Disagree - Clinically unable to determine / Unknown  -- Refer to Clinical Documentation Reviewer    PROVIDER RESPONSE TEXT:    This patient has acute blood loss anemia. Query created by: Jeremias Smallwood on 2/3/2022 1:52 PM      QUERY TEXT:    Pt admitted with urinary retention secondary to blood clots and gross   hematuria. Pt noted to have an elevated serum Creatinine level of 1.42 mg/dL   on admission. Historical lab trends indicate a baseline creatinine range of   0.92- 1.16 mg/dL. If possible, please document in the progress notes and discharge summary if   you are evaluating and/or treating any of the following: The medical record reflects the following:  Risk Factors: Acute blood loss/ fluid loss: Gross hematuria, DM2, HTN  Clinical Indicators: Patients BMP during ED evaluation resulted in a Serum   Creatinine level of 1.26 mg/dL. Upon admission, Creatinine increased to 1.42 mg/dL. 2/3,  Creatinine level   1.14 mg/dL. No history of CKD to note etiology of elevated creatinine, historical lab   trends indicate a baseline of 0.92- 1.16 mg/dL. Treatment: Treatment of underlying cause, hold anticoagulation d/t hematuria,   fluid replacement. monitor and trend labs, Treatment of fluid deficits. Defined by Kidney Disease Improving Global Outcomes (KDIGO) clinical practice   guideline for acute kidney injury:  -Increase in SCr by greater than or equal to 0.3 mg/dl within 48 hours; or  -Increase or decrease in SCr to greater than or equal to 1.5 times baseline,   which is known or presumed to have occurred within the prior 7 days; or  -Urine volume < 0.5ml/kg/h for 6 hours  Options provided:  -- Acute kidney failure  -- Acute kidney failure with acute tubular necrosis  -- Acute kidney injury  -- Other - I will add my own diagnosis  -- Disagree - Not applicable / Not valid  -- Disagree - Clinically unable to determine / Unknown  -- Refer to Clinical Documentation Reviewer    PROVIDER RESPONSE TEXT:    This patient has an Acute kidney injury.     Query created by: Jeremias Smallwood on 2/3/2022 2:07 PM      Electronically signed by: Chhaya Brown MD 2/4/2022 9:11 AM

## 2022-02-04 NOTE — PROGRESS NOTES
Pt resting in bed with eyes closed, re-assessment complete, denies pain at this time, no other complaints voiced, CBI continues, draining clear pink urine at this time, small clots continue, patient denies any pressure or pain in bladder area, patient NPO at midnight, all needs meet, call light within reach will continue to monitor.

## 2022-02-04 NOTE — PROGRESS NOTES
Received call back from Dr. Rodolfo Hendrix at this time. Update given to Dr. Ambar Contreras and, per Dr. Ambar Contreras, start with trying to take fluid out of balloon and advance all the way and then re-inflate it. Per Dr. Ambar Contreras, if that does not work, call back and either the catheter will be taken out or the patient will go to OR tomorrow. Will attempt to advance catheter shortly.

## 2022-02-04 NOTE — PROGRESS NOTES
Writer to bedside at this time to check patient. Patient states he noticed his CBI stopped flowing a couple minutes ago. Writer attempted to irrigate the catheter with 250cc of NS, 150cc returned back into syringe. Several small clots noted, urine is dark pink but CBI still barely flowing. Edna Limon RN supervisor to irrigate shortly.

## 2022-02-04 NOTE — PROGRESS NOTES
Shift assessment and vitals obtained at this time as charted. Vitals WNL. CBI is running wide open at this time, urine is pink with a few small clots in it. Right before writer left room, patient began complaining of some pain. Writer irrigated catheter with 60cc of NS. A few small clots returned. Drainage remained pink, patient states discomfort is improved. CBI is flowing as it should. Will continue to monitor.

## 2022-02-04 NOTE — PROGRESS NOTES
Writer called and spoke with Dr. Orozco Later via telephone at this time, per Dr. Orozco Later, since patient's hemoglobin did not drop, INR is still higher than he would like, and urine is pink in color, he is not going to take the patient to the OR today. He would like to try to slow down to rate of the CBI. Will attempt shortly. Per Dr. Orozco Later, patient can eat and take his morning medications. Will update the patient.

## 2022-02-04 NOTE — PLAN OF CARE
Writer called to room to check CBI. Prather draining pink to tea colored urine. CBI wide open, but minimally dripping. Bladder scan for 64 mL x2 scans. Pt not uncomfortable. Attempt to irrigate, no clots returned. Continue to monitor.

## 2022-02-04 NOTE — PROGRESS NOTES
Reassessment and vitals obtained at this time as charted. Vitals WNL. Patient is resting in the bed at this time, denies any needs. Will continue to monitor.

## 2022-02-04 NOTE — PROGRESS NOTES
Comprehensive Nutrition Assessment    Type and Reason for Visit:  Reassess    Nutrition Recommendations/Plan: continue current diet    Nutrition Assessment:  Altered nutrition related lab values r/t renal dysfunction aeb Hgb 10.6, on ferrous sulfate, hematuria. Glucose 154. Pt states the food is good, has no concerns. Reports he will likely be here through the weekend. No nutritional concerns at this time. Malnutrition Assessment:  Malnutrition Status: At risk for malnutrition (Comment)    Context:  Acute Illness     Findings of the 6 clinical characteristics of malnutrition:  Energy Intake:  No significant decrease in energy intake  Weight Loss:  7 - Greater than 2% over 1 week (3% weight loss x 3 days)     Body Fat Loss:  No significant body fat loss     Muscle Mass Loss:  No significant muscle mass loss    Fluid Accumulation:  No significant fluid accumulation     Strength:  Not Performed    Estimated Daily Nutrient Needs:  Energy (kcal):  4987-0471 (20-23/kg); Weight Used for Energy Requirements:  Current     Protein (g):  94-109g (1.2-1.4g/kg); Weight Used for Protein Requirements:  Ideal        Fluid (ml/day):  2050 ml (25 ml/kg); Method Used for Fluid Requirements:  ml/Kg      Nutrition Related Findings:  appears well nourished      Wounds:  None       Current Nutrition Therapies:    ADULT DIET; Regular    Anthropometric Measures:  · Height: 5' 11\" (180.3 cm)  · Current Body Weight: 183 lb (83 kg)   · Admission Body Weight: 179 lb 11.2 oz (81.5 kg)    · Usual Body Weight: 187 lb (84.8 kg)     · Ideal Body Weight: 172 lbs; % Ideal Body Weight 105.3 %   · BMI: 25.5  · BMI Categories: Overweight (BMI 25.0-29. 9)       Nutrition Diagnosis:   · Altered nutrition-related lab values related to renal dysfunction as evidenced by lab values,other (comment) (on ferrous sulfate, Hgb, hematuria)      Nutrition Interventions:   Food and/or Nutrient Delivery:  Continue Current Diet  Nutrition Education/Counseling:  No recommendation at this time   Coordination of Nutrition Care:  Continue to monitor while inpatient    Goals:  PO > 75% of meals        Recent Labs     02/02/22  0535 02/02/22  0535 02/03/22  0540 02/04/22  0550     --  139 133*   K 4.0  --  4.4 4.3     --  100 99   CO2 22  --  23 23   BUN 37*  --  30* 22   CREATININE 1.42*  --  1.14 0.99   GLUCOSE 169*  --  141* 154*   GFR              < >                          < > = values in this interval not displayed.       Lab Results   Component Value Date    LABALBU 4.8 09/27/2021      Hemoglobin/Hematocrit:    Lab Results   Component Value Date    HGB 9.5 02/04/2022    HCT 29.9 02/04/2022     Nutrition Monitoring and Evaluation:   Behavioral-Environmental Outcomes:  None Identified   Food/Nutrient Intake Outcomes:  Food and Nutrient Intake  Physical Signs/Symptoms Outcomes:  Biochemical Data,Weight     Discharge Planning:    Continue current diet     Electronically signed by Akila Martínez RD, LD on 2/4/22 at 12:55 PM EST    Contact: [phone]

## 2022-02-04 NOTE — PROGRESS NOTES
Jessica Sarmiento M.D. Internal Medicine Progress Note    Patient: Dalila Ceja  Date of Admission: 2/1/2022 11:17 AM  Hospital Day # 3  Date of Evaluation: 2/4/2022      SUBJECTIVE:    Patient seen for f/u of Gross hematuria. He is feeling better today. His urine was nearly yellow overnight then he complained of lower abd pain. Irrigation was performed with evacuation of blood clots then some red urine for an hour or so. Urine is light pink now. Denies any abd pain currently. Tolerating diet without n/v/d.      ROS:   Constitutional: negative  for fevers, and negative for chills. Respiratory: negative for shortness of breath, negative for cough, and negative for wheezing  Cardiovascular: negative for chest pain, and negative for palpitations  Gastrointestinal: negative for abdominal pain, negative for nausea,negative for vomiting, negative for diarrhea, and negative for constipation     All other systems were reviewed with the patient and are negative unless otherwise stated in HPI    -----------------------------------------------------------------  OBJECTIVE:  Vitals:   Temp: 97.3 °F (36.3 °C)  BP: 132/61  Resp: 16  Pulse: 90  SpO2: 96 % on room air    Weight  Wt Readings from Last 3 Encounters:   02/04/22 183 lb (83 kg)   01/31/22 187 lb (84.8 kg)   01/13/22 183 lb (83 kg)     Body mass index is 25.52 kg/m². 24HR INTAKE/OUTPUT:      Intake/Output Summary (Last 24 hours) at 2/4/2022 0913  Last data filed at 2/4/2022 0802  Gross per 24 hour   Intake 1226.54 ml   Output 4775 ml   Net -3548.46 ml       Exam:  GEN:    Awake, alert and oriented x3. EYES:  EOMI, pupils equal   NECK: Supple. No lymphadenopathy. No carotid bruit  CVS:    regular rate and rhythm, no audible murmur  PULM:  CTA, no wheezes, rales or rhonchi, no acute respiratory distress  ABD:    Bowels sounds normal.  Abdomen is soft. No distention. no tenderness to palpation. EXT:   no edema bilaterally . No calf tenderness. NEURO: Moves all extremities. Motor and sensory are grossly intact  SKIN:  No rashes. No skin lesions.    -----------------------------------------------------------------  DATA:  Complete Blood Count:   Recent Labs     02/02/22  0535 02/02/22  0535 02/02/22  1410 02/03/22  0540 02/04/22  0550   WBC 7.7  --   --  7.3 5.7   RBC 3.97*  --   --  3.53* 3.59*   HGB 10.6*   < > 10.6* 9.3* 9.5*   HCT 32.1*   < > 32.3* 29.0* 29.9*   MCV 80.9*  --   --  82.2* 83.3   MCH 26.7  --   --  26.3 26.5   MCHC 33.0  --   --  32.1 31.8   RDW 18.3*  --   --  18.3* 18.1*   *  --   --  105* See Reflexed IPF Result   MPV 10.0  --   --  9.1 NOT REPORTED    < > = values in this interval not displayed. Last 3 Blood Glucose:   Recent Labs     02/02/22  0535 02/03/22  0540 02/04/22  0550   GLUCOSE 169* 141* 154*        Comprehensive Metabolic Profile:   Recent Labs     02/02/22  0535 02/03/22  0540 02/04/22  0550    139 133*   K 4.0 4.4 4.3    100 99   CO2 22 23 23   BUN 37* 30* 22   CREATININE 1.42* 1.14 0.99   GLUCOSE 169* 141* 154*   CALCIUM 8.2* 8.4* 8.7          ASSESSMENT / PLAN:  · Gross hematuria and clots due to coumadin and hypercoaguable state  ? Continue  CBI - wean rate per urology  ? Continue flomax  ? Appreciate urology consult - no OR today due to stable Hb and INR 1.2  · Hypertension   ? Continue Lisinopril/HCTZ  · Diabetes mellitus  ? Continue Kombiglyze  · Hypercoaguable state  ? Coumadin on hold due to hematuria  · Nutrition status:   ? Well developed, well nourished with no malnutrition  ? Dietician consult initiated  · Hospital Prophylaxis:   ? DVT: SCD's   ?  Stress Ulcer: H2 Blocker   · High risk medications: none   · Disposition:    ? Discharge plan is pending      Adeel Lopez MD , M.D.  2/4/2022  9:13 AM

## 2022-02-04 NOTE — PROGRESS NOTES
Catheter balloon deflated at this time, catheter advanced all the way in, and balloon re-inflated. Urine returned immediately but CBI still not flowing. Catheter irrigated and then CBI began to flow. Patient verbalizes feeling more comfortable. Will continue to monitor.

## 2022-02-04 NOTE — PROGRESS NOTES
Pt called out and stated his bladder was hurting, irrigated with 450 ml of normal saline, removed several large clots, CBI running wide open at all times, patient states feels better, call light within reach, will continue to monitor.

## 2022-02-04 NOTE — PROGRESS NOTES
Writer to bedside at this time to check on patient. Patient assisted to the chair at this time with a one assist. After sitting down in the chair, CBI was not flowing. Writer irrigated catheter with 180cc of normal saline, 150cc of light red returned back into syringe but with difficulty. No clots returned. CBI then hooked back up and is now flowing freely. Patient denies discomfort. Will continue to monitor.

## 2022-02-05 LAB
ABSOLUTE EOS #: 0.11 K/UL (ref 0–0.44)
ABSOLUTE IMMATURE GRANULOCYTE: 0 K/UL (ref 0–0.3)
ABSOLUTE LYMPH #: 0.98 K/UL (ref 1.1–3.7)
ABSOLUTE MONO #: 1.21 K/UL (ref 0.1–1.2)
ANION GAP SERPL CALCULATED.3IONS-SCNC: 9 MMOL/L (ref 9–17)
BASOPHILS # BLD: 0 % (ref 0–2)
BASOPHILS ABSOLUTE: 0 K/UL (ref 0–0.2)
BUN BLDV-MCNC: 15 MG/DL (ref 8–23)
BUN/CREAT BLD: 18 (ref 9–20)
CALCIUM SERPL-MCNC: 8.7 MG/DL (ref 8.6–10.4)
CHLORIDE BLD-SCNC: 100 MMOL/L (ref 98–107)
CO2: 24 MMOL/L (ref 20–31)
CREAT SERPL-MCNC: 0.84 MG/DL (ref 0.7–1.2)
DIFFERENTIAL TYPE: ABNORMAL
EOSINOPHILS RELATIVE PERCENT: 2 % (ref 1–4)
GFR AFRICAN AMERICAN: >60 ML/MIN
GFR NON-AFRICAN AMERICAN: >60 ML/MIN
GFR SERPL CREATININE-BSD FRML MDRD: ABNORMAL ML/MIN/{1.73_M2}
GFR SERPL CREATININE-BSD FRML MDRD: ABNORMAL ML/MIN/{1.73_M2}
GLUCOSE BLD-MCNC: 149 MG/DL (ref 70–99)
HCT VFR BLD CALC: 28.1 % (ref 40.7–50.3)
HEMOGLOBIN: 9.2 G/DL (ref 13–17)
IMMATURE GRANULOCYTES: 0 %
INR BLD: 1.2
LYMPHOCYTES # BLD: 17 % (ref 24–43)
MCH RBC QN AUTO: 26.7 PG (ref 25.2–33.5)
MCHC RBC AUTO-ENTMCNC: 32.7 G/DL (ref 28.4–34.8)
MCV RBC AUTO: 81.4 FL (ref 82.6–102.9)
MONOCYTES # BLD: 21 % (ref 3–12)
MORPHOLOGY: NORMAL
NRBC AUTOMATED: 1.6 PER 100 WBC
NUCLEATED RED BLOOD CELLS: 1 PER 100 WBC (ref 0–5)
PDW BLD-RTO: 17.8 % (ref 11.8–14.4)
PLATELET # BLD: 126 K/UL (ref 138–453)
PLATELET ESTIMATE: ABNORMAL
PMV BLD AUTO: 9.7 FL (ref 8.1–13.5)
POTASSIUM SERPL-SCNC: 4.3 MMOL/L (ref 3.7–5.3)
PROTHROMBIN TIME: 15.4 SEC (ref 11.5–14.2)
RBC # BLD: 3.45 M/UL (ref 4.21–5.77)
RBC # BLD: ABNORMAL 10*6/UL
SEG NEUTROPHILS: 60 % (ref 36–65)
SEGMENTED NEUTROPHILS ABSOLUTE COUNT: 3.44 K/UL (ref 1.5–8.1)
SODIUM BLD-SCNC: 133 MMOL/L (ref 135–144)
WBC # BLD: 5.7 K/UL (ref 3.5–11.3)
WBC # BLD: ABNORMAL 10*3/UL

## 2022-02-05 PROCEDURE — 51798 US URINE CAPACITY MEASURE: CPT

## 2022-02-05 PROCEDURE — 85610 PROTHROMBIN TIME: CPT

## 2022-02-05 PROCEDURE — 85025 COMPLETE CBC W/AUTO DIFF WBC: CPT

## 2022-02-05 PROCEDURE — 6370000000 HC RX 637 (ALT 250 FOR IP): Performed by: NURSE PRACTITIONER

## 2022-02-05 PROCEDURE — 80048 BASIC METABOLIC PNL TOTAL CA: CPT

## 2022-02-05 PROCEDURE — 94761 N-INVAS EAR/PLS OXIMETRY MLT: CPT

## 2022-02-05 PROCEDURE — 1200000000 HC SEMI PRIVATE

## 2022-02-05 PROCEDURE — 36415 COLL VENOUS BLD VENIPUNCTURE: CPT

## 2022-02-05 PROCEDURE — 2580000003 HC RX 258: Performed by: NURSE PRACTITIONER

## 2022-02-05 RX ADMIN — PANTOPRAZOLE SODIUM 40 MG: 40 TABLET, DELAYED RELEASE ORAL at 07:35

## 2022-02-05 RX ADMIN — SODIUM CHLORIDE: 9 INJECTION, SOLUTION INTRAVENOUS at 12:11

## 2022-02-05 RX ADMIN — FERROUS SULFATE TAB 325 MG (65 MG ELEMENTAL FE) 325 MG: 325 (65 FE) TAB at 08:39

## 2022-02-05 RX ADMIN — ACETAMINOPHEN 650 MG: 325 TABLET ORAL at 07:35

## 2022-02-05 RX ADMIN — TAMSULOSIN HYDROCHLORIDE 0.4 MG: 0.4 CAPSULE ORAL at 08:39

## 2022-02-05 RX ADMIN — LISINOPRIL AND HYDROCHLOROTHIAZIDE 1 TABLET: 25; 20 TABLET ORAL at 08:39

## 2022-02-05 RX ADMIN — PRIMIDONE 150 MG: 50 TABLET ORAL at 08:39

## 2022-02-05 RX ADMIN — PRIMIDONE 150 MG: 50 TABLET ORAL at 20:15

## 2022-02-05 RX ADMIN — METFORMIN HYDROCHLORIDE 1000 MG: 500 TABLET, EXTENDED RELEASE ORAL at 08:39

## 2022-02-05 RX ADMIN — ALOGLIPTIN 25 MG: 25 TABLET, FILM COATED ORAL at 08:39

## 2022-02-05 ASSESSMENT — PAIN SCALES - GENERAL
PAINLEVEL_OUTOF10: 5
PAINLEVEL_OUTOF10: 2

## 2022-02-05 NOTE — PROGRESS NOTES
Spoke with Dr Cary Alex on the phone. Provided urine update to him. Provider wishes for phillips to be pulled at this time and to monitor for a few post voids.

## 2022-02-05 NOTE — PROGRESS NOTES
Writer in room for second assessment. Patient resting quietly upon arrival. Vitals and assessment obtained at this time, see flowsheet for details. Prather emptied. Call light within reach, will continue to monitor.

## 2022-02-05 NOTE — PROGRESS NOTES
Writer at bedside for shift assessment. Patient sitting up in chair, respirations are even and unlabored while on room air. Vitals obtained and assessment completed, see flowsheet for details. Pt stated he was disappointed that he did not get to go home today. Comfort and education provided to patient. pt denies further needs at this time. Call light in reach, will continue to monitor.

## 2022-02-05 NOTE — PROGRESS NOTES
Addie Leonard M.D. Internal Medicine Progress Note    Patient: Nnamdi Copeland  Date of Admission: 2/1/2022 11:17 AM  Hospital Day # 4  Date of Evaluation: 2/5/2022      SUBJECTIVE:    Patient seen for f/u of Gross hematuria. He is feeling better today. His urine was clear and phillips was DC'd this AM.  He is awaiting voiding trials. He denies any symptoms at this time. ROS:   Constitutional: negative  for fevers, and negative for chills. Respiratory: negative for shortness of breath, negative for cough, and negative for wheezing  Cardiovascular: negative for chest pain, and negative for palpitations  Gastrointestinal: negative for abdominal pain, negative for nausea,negative for vomiting, negative for diarrhea, and negative for constipation     All other systems were reviewed with the patient and are negative unless otherwise stated in HPI    -----------------------------------------------------------------  OBJECTIVE:  Vitals:   Temp: 97.1 °F (36.2 °C)  BP: (!) 143/61  Resp: 18  Pulse: 91  SpO2: 98 % on room air    Weight  Wt Readings from Last 3 Encounters:   02/05/22 181 lb 14.4 oz (82.5 kg)   01/31/22 187 lb (84.8 kg)   01/13/22 183 lb (83 kg)     Body mass index is 25.37 kg/m². 24HR INTAKE/OUTPUT:      Intake/Output Summary (Last 24 hours) at 2/5/2022 1155  Last data filed at 2/5/2022 0935  Gross per 24 hour   Intake 816.61 ml   Output 3600 ml   Net -2783.39 ml       Exam:  GEN:    Awake, alert and oriented x3. EYES:  EOMI, pupils equal   NECK: Supple. No lymphadenopathy. No carotid bruit  CVS:    regular rate and rhythm, systolic murmur  PULM:  CTA, no wheezes, rales or rhonchi, no acute respiratory distress  ABD:    Bowels sounds normal.  Abdomen is soft. No distention. no tenderness to palpation. EXT:   no edema bilaterally . No calf tenderness. NEURO: Moves all extremities. Motor and sensory are grossly intact  SKIN:  No rashes. No skin lesions. -----------------------------------------------------------------  DATA:  Complete Blood Count:   Recent Labs     02/03/22  0540 02/04/22  0550 02/05/22  0545   WBC 7.3 5.7 5.7   RBC 3.53* 3.59* 3.45*   HGB 9.3* 9.5* 9.2*   HCT 29.0* 29.9* 28.1*   MCV 82.2* 83.3 81.4*   MCH 26.3 26.5 26.7   MCHC 32.1 31.8 32.7   RDW 18.3* 18.1* 17.8*   * See Reflexed IPF Result 126*   MPV 9.1 NOT REPORTED 9.7        Last 3 Blood Glucose:   Recent Labs     02/03/22  0540 02/04/22  0550 02/05/22  0545   GLUCOSE 141* 154* 149*        Comprehensive Metabolic Profile:   Recent Labs     02/03/22  0540 02/04/22  0550 02/05/22  0545    133* 133*   K 4.4 4.3 4.3    99 100   CO2 23 23 24   BUN 30* 22 15   CREATININE 1.14 0.99 0.84   GLUCOSE 141* 154* 149*   CALCIUM 8.4* 8.7 8.7          ASSESSMENT / PLAN:  · Gross hematuria and clots due to coumadin and hypercoaguable state  ? CBI DC'd and phillips removed this AM  ? Voiding trials ordered  ? Continue flomax  ? Appreciate urology consult   · Hypertension   ? Continue Lisinopril/HCTZ  · Diabetes mellitus  ? Continue Kombiglyze  · Hypercoaguable state  ? Coumadin on hold due to hematuria  · Nutrition status:   ? Well developed, well nourished with no malnutrition  ? Dietician consult initiated  · Hospital Prophylaxis:   ? DVT: SCD's   ?  Stress Ulcer: H2 Blocker   · High risk medications: none   · Disposition:    ? Discharge plan is Home if able to urinate      Tiara Hanley MD , M.D.  2/5/2022  11:55 AM

## 2022-02-05 NOTE — PROGRESS NOTES
Patient sitting up in the chair. Expressed frustration with the process and not getting a large void but denies pain/discomfort. Education provided. Will continue to monitor.

## 2022-02-06 VITALS
TEMPERATURE: 97.1 F | HEART RATE: 87 BPM | OXYGEN SATURATION: 97 % | RESPIRATION RATE: 16 BRPM | SYSTOLIC BLOOD PRESSURE: 147 MMHG | BODY MASS INDEX: 25.44 KG/M2 | HEIGHT: 71 IN | WEIGHT: 181.7 LBS | DIASTOLIC BLOOD PRESSURE: 54 MMHG

## 2022-02-06 LAB
ABSOLUTE EOS #: 0.06 K/UL (ref 0–0.44)
ABSOLUTE IMMATURE GRANULOCYTE: 0.12 K/UL (ref 0–0.3)
ABSOLUTE LYMPH #: 0.67 K/UL (ref 1.1–3.7)
ABSOLUTE MONO #: 0.67 K/UL (ref 0.1–1.2)
ANION GAP SERPL CALCULATED.3IONS-SCNC: 9 MMOL/L (ref 9–17)
BASOPHILS # BLD: 0 % (ref 0–2)
BASOPHILS ABSOLUTE: 0 K/UL (ref 0–0.2)
BUN BLDV-MCNC: 20 MG/DL (ref 8–23)
BUN/CREAT BLD: 23 (ref 9–20)
CALCIUM SERPL-MCNC: 8.9 MG/DL (ref 8.6–10.4)
CHLORIDE BLD-SCNC: 96 MMOL/L (ref 98–107)
CO2: 24 MMOL/L (ref 20–31)
CREAT SERPL-MCNC: 0.86 MG/DL (ref 0.7–1.2)
DIFFERENTIAL TYPE: ABNORMAL
EOSINOPHILS RELATIVE PERCENT: 1 % (ref 1–4)
GFR AFRICAN AMERICAN: >60 ML/MIN
GFR NON-AFRICAN AMERICAN: >60 ML/MIN
GFR SERPL CREATININE-BSD FRML MDRD: ABNORMAL ML/MIN/{1.73_M2}
GFR SERPL CREATININE-BSD FRML MDRD: ABNORMAL ML/MIN/{1.73_M2}
GLUCOSE BLD-MCNC: 149 MG/DL (ref 70–99)
HCT VFR BLD CALC: 27.4 % (ref 40.7–50.3)
HEMOGLOBIN: 9 G/DL (ref 13–17)
IMMATURE GRANULOCYTES: 2 %
INR BLD: 1.2
LYMPHOCYTES # BLD: 11 % (ref 24–43)
MCH RBC QN AUTO: 26.7 PG (ref 25.2–33.5)
MCHC RBC AUTO-ENTMCNC: 32.8 G/DL (ref 28.4–34.8)
MCV RBC AUTO: 81.3 FL (ref 82.6–102.9)
MONOCYTES # BLD: 11 % (ref 3–12)
MORPHOLOGY: NORMAL
NRBC AUTOMATED: 1.6 PER 100 WBC
PDW BLD-RTO: 17.6 % (ref 11.8–14.4)
PLATELET # BLD: ABNORMAL K/UL (ref 138–453)
PLATELET ESTIMATE: ABNORMAL
PLATELET, FLUORESCENCE: 140 K/UL (ref 138–453)
PLATELET, IMMATURE FRACTION: 5.8 % (ref 1.1–10.3)
PMV BLD AUTO: ABNORMAL FL (ref 8.1–13.5)
POTASSIUM SERPL-SCNC: 4.3 MMOL/L (ref 3.7–5.3)
PROTHROMBIN TIME: 15.3 SEC (ref 11.5–14.2)
RBC # BLD: 3.37 M/UL (ref 4.21–5.77)
RBC # BLD: ABNORMAL 10*6/UL
SEG NEUTROPHILS: 75 % (ref 36–65)
SEGMENTED NEUTROPHILS ABSOLUTE COUNT: 4.58 K/UL (ref 1.5–8.1)
SODIUM BLD-SCNC: 129 MMOL/L (ref 135–144)
WBC # BLD: 6.1 K/UL (ref 3.5–11.3)
WBC # BLD: ABNORMAL 10*3/UL

## 2022-02-06 PROCEDURE — 85610 PROTHROMBIN TIME: CPT

## 2022-02-06 PROCEDURE — 36415 COLL VENOUS BLD VENIPUNCTURE: CPT

## 2022-02-06 PROCEDURE — 2580000003 HC RX 258: Performed by: NURSE PRACTITIONER

## 2022-02-06 PROCEDURE — 94761 N-INVAS EAR/PLS OXIMETRY MLT: CPT

## 2022-02-06 PROCEDURE — 85055 RETICULATED PLATELET ASSAY: CPT

## 2022-02-06 PROCEDURE — 51798 US URINE CAPACITY MEASURE: CPT

## 2022-02-06 PROCEDURE — 6370000000 HC RX 637 (ALT 250 FOR IP): Performed by: NURSE PRACTITIONER

## 2022-02-06 PROCEDURE — 85025 COMPLETE CBC W/AUTO DIFF WBC: CPT

## 2022-02-06 PROCEDURE — 80048 BASIC METABOLIC PNL TOTAL CA: CPT

## 2022-02-06 RX ADMIN — PRIMIDONE 150 MG: 50 TABLET ORAL at 08:32

## 2022-02-06 RX ADMIN — PANTOPRAZOLE SODIUM 40 MG: 40 TABLET, DELAYED RELEASE ORAL at 07:11

## 2022-02-06 RX ADMIN — FERROUS SULFATE TAB 325 MG (65 MG ELEMENTAL FE) 325 MG: 325 (65 FE) TAB at 08:32

## 2022-02-06 RX ADMIN — SODIUM CHLORIDE: 9 INJECTION, SOLUTION INTRAVENOUS at 07:14

## 2022-02-06 RX ADMIN — ALOGLIPTIN 25 MG: 25 TABLET, FILM COATED ORAL at 08:32

## 2022-02-06 RX ADMIN — LISINOPRIL AND HYDROCHLOROTHIAZIDE 1 TABLET: 25; 20 TABLET ORAL at 08:32

## 2022-02-06 RX ADMIN — METFORMIN HYDROCHLORIDE 1000 MG: 500 TABLET, EXTENDED RELEASE ORAL at 08:32

## 2022-02-06 RX ADMIN — TAMSULOSIN HYDROCHLORIDE 0.4 MG: 0.4 CAPSULE ORAL at 08:32

## 2022-02-06 NOTE — DISCHARGE SUMMARY
Dio Rice M.D. Internal Medicine Discharge Summary    Patient ID:  Charley Baars  [id number]  1950    Admission date: 2/1/2022    Discharge date: 2/6/2022     Admitting Physician: Jordyn Kaplan MD     Primary Care Physician: Bee Cuba MD     Primary Discharge Diagnoses:    Diagnosis    Gross hematuria    Acute blood loss anemia    Long term anticoagulation    hypercoaguable state: Factor V Leiden and Lupus anticoagulant       Additional Diagnoses:       Diagnosis Date    Myeloproliferative disorder      Diabetes mellitus ([facility]     Hypercoagulable state ([facility]     Factor V Leiden and Lupus anticoagulant - on long term anticoagulation    Hypertension         Hospital Course: The patient was admitted for gross hematuria exacerbated by long term anticoagulation following a cystoscopy procedure. He was treated with CBI and his hematuria slowly improved over the course of his hospitalization. He did not require any further procedures. Prather was DC'd yesterday and he completed several voiding trials. He will f/u with Urology as an outpatient. His warfarin and ASA were held due to hematuria and he was instructed to f/u with Hematology prior to restarting. Discharge Exam:  GEN:    Awake, alert and oriented x3. EYES:  EOMI, pupils equal   NECK: Supple. No lymphadenopathy. No carotid bruit  CVS:    regular rate and rhythm, no audible murmur  PULM:  CTA, no wheezes, rales or rhonchi, no acute respiratory distress  ABD:    Bowels sounds normal.  Abdomen is soft. No distention. no tenderness to palpation. EXT:   no edema bilaterally . No calf tenderness. NEURO: Moves all extremities. Motor and sensory are grossly intact  SKIN:  No rashes. No skin lesions.       Consultants:    · Dr. Dulce Velasco, urology    Procedures:    · none    Complications:   · none    Significant Diagnostic Studies:   · Discharge Labs:  Lab Results   Component Value Date    WBC 6.1 02/06/2022    HGB 9.0 (L) 02/06/2022    MCV 81.3 (L) 02/06/2022     (L) 02/06/2022      Lab Results   Component Value Date    GLUCOSE 149 (H) 02/06/2022    BUN 20 02/06/2022    CREATININE 0.86 02/06/2022     (L) 02/06/2022    K 4.3 02/06/2022    CALCIUM 8.9 02/06/2022    CL 96 (L) 02/06/2022    CO2 24 02/06/2022       Discharge Condition:   · stable    Disposition:   · Discharge to Home    Discharge Medications:     Medication List      CONTINUE taking these medications    ferrous sulfate 325 (65 Fe) MG tablet  Commonly known as: IRON 325  Take 1 tablet by mouth daily     Jakafi 10 MG tablet  Generic drug: ruxolitinib phosphate  TAKE 1 TABLET 2 TIMES A DAY     Kombiglyze XR 5-1000 MG Tb24  Generic drug: sAXagliptin-metFORMIN ER     lisinopril-hydroCHLOROthiazide 20-25 MG per tablet  Commonly known as: PRINZIDE;ZESTORETIC     MULTIVITAMIN ADULT PO     OMEGA-3 FATTY ACIDS-VITAMIN E PO     primidone 50 MG tablet  Commonly known as:  MYSOLINE     RA Omeprazole 20 MG EC tablet  Generic drug: omeprazole     Steglatro 5 MG Tabs  Generic drug: Ertugliflozin L-PyroglutamicAc     tamsulosin 0.4 MG capsule  Commonly known as: FLOMAX        STOP taking these medications until you follow up with your Hematologist    aspirin 81 MG tablet     warfarin 2 MG tablet  Commonly known as: COUMADIN     warfarin 5 MG tablet  Commonly known as: COUMADIN            Patient Instructions:   · Activity: activity as tolerated  · Diet: diabetic diet  · Wound Care: none needed  · Follow up with Afshin Castillo MD in 1-2 weeks   · Follow-up with Dr. Orozco Later in 1-2 weeks as directed - call for appt    CORE MEASURES on Discharge (if applicable)  ACE/ARB in CHF: N/A  ASA in MI: N/A  Statin in MI: N/A  Statin in CVA: N/A  Antiplatelet in CVA: N/A    Total time spent on discharge services: 40 minutes  Including the following activities:  · Evaluation and Management of patient  · Discussion with patient and/or surrogate about current care plan  · Coordination with Case Management and/or   · Coordination of care with Consultants (if applicable)   · Coordination of care with Receiving Facility Physician (if applicable)  · Completion of DME forms (if applicable)  · Preparation of Discharge Summary  · Preparation of Medication Reconciliation  · Preparation of Discharge Prescriptions      Signed:  Chaz Ward MD, M.D.  2/6/2022  12:55 PM

## 2022-02-06 NOTE — PROGRESS NOTES
Writer at bedside for second assessment. Patient resting comfortably in bed. Vitals obtained and assessment completed at this time. Urinal emptied. Pt denies further needs, call light within reach will continue to monitor.

## 2022-02-06 NOTE — PROGRESS NOTES
Discharge instructions given to patient. All questions answered. IV removed. Patient getting dressed and will call out once his wife arrives.

## 2022-02-06 NOTE — PROGRESS NOTES
Jose Palomo M.D. Internal Medicine Progress Note    Patient: Jackelyn Jameson  Date of Admission: 2/1/2022 11:17 AM  Hospital Day # 5  Date of Evaluation: 2/6/2022      SUBJECTIVE:    Patient seen for f/u of Gross hematuria. His urine was clear and phillips was DC'd yesterday. He has been voiding on his own and feels much better. He denies any symptoms at this time. ROS:   Constitutional: negative  for fevers, and negative for chills. Respiratory: negative for shortness of breath, negative for cough, and negative for wheezing  Cardiovascular: negative for chest pain, and negative for palpitations  Gastrointestinal: negative for abdominal pain, negative for nausea,negative for vomiting, negative for diarrhea, and negative for constipation     All other systems were reviewed with the patient and are negative unless otherwise stated in HPI    -----------------------------------------------------------------  OBJECTIVE:  Vitals:   Temp: 97.1 °F (36.2 °C)  BP: (!) 147/54  Resp: 16  Pulse: 87  SpO2: 97 % on room air    Weight  Wt Readings from Last 3 Encounters:   02/06/22 181 lb 11.2 oz (82.4 kg)   01/31/22 187 lb (84.8 kg)   01/13/22 183 lb (83 kg)     Body mass index is 25.34 kg/m². 24HR INTAKE/OUTPUT:      Intake/Output Summary (Last 24 hours) at 2/6/2022 1240  Last data filed at 2/6/2022 0837  Gross per 24 hour   Intake 1800 ml   Output 3200 ml   Net -1400 ml       Exam:  GEN:    Awake, alert and oriented x3. EYES:  EOMI, pupils equal   NECK: Supple. No lymphadenopathy. No carotid bruit  CVS:    regular rate and rhythm, systolic murmur  PULM:  CTA, no wheezes, rales or rhonchi, no acute respiratory distress  ABD:    Bowels sounds normal.  Abdomen is soft. No distention. no tenderness to palpation. EXT:   no edema bilaterally . No calf tenderness. NEURO: Moves all extremities. Motor and sensory are grossly intact  SKIN:  No rashes. No skin lesions. -----------------------------------------------------------------  DATA:  Complete Blood Count:   Recent Labs     02/04/22  0550 02/05/22  0545 02/06/22  0610   WBC 5.7 5.7 6.1   RBC 3.59* 3.45* 3.37*   HGB 9.5* 9.2* 9.0*   HCT 29.9* 28.1* 27.4*   MCV 83.3 81.4* 81.3*   MCH 26.5 26.7 26.7   MCHC 31.8 32.7 32.8   RDW 18.1* 17.8* 17.6*   PLT See Reflexed IPF Result 126* See Reflexed IPF Result   MPV NOT REPORTED 9.7 NOT REPORTED        Last 3 Blood Glucose:   Recent Labs     02/04/22  0550 02/05/22  0545 02/06/22  0610   GLUCOSE 154* 149* 149*        Comprehensive Metabolic Profile:   Recent Labs     02/04/22  0550 02/05/22  0545 02/06/22  0610   * 133* 129*   K 4.3 4.3 4.3   CL 99 100 96*   CO2 23 24 24   BUN 22 15 20   CREATININE 0.99 0.84 0.86   GLUCOSE 154* 149* 149*   CALCIUM 8.7 8.7 8.9          ASSESSMENT / PLAN:  · Gross hematuria and clots due to coumadin and hypercoaguable state  ? CBI DC'd and phillips removed yesterday  ? Voiding trials completed  ? Continue flomax  ? Appreciate urology consult   · Hypertension   ? Continue Lisinopril/HCTZ  · Diabetes mellitus  ? Continue Kombiglyze  · Hypercoaguable state (Factor V Leiden and Lupus anticoagulant)  ? Coumadin on hold due to hematuria  ? Discuss with Heme/Onc prior to restarting  · Nutrition status:   ? Well developed, well nourished with no malnutrition  ? Dietician consult initiated  · Hospital Prophylaxis:   ? DVT: SCD's   ?  Stress Ulcer: H2 Blocker   · High risk medications: none   · Disposition:    ? Discharge plan is Home today      Nedra Flores MD , M.D.  2/6/2022  12:40 PM

## 2022-02-06 NOTE — PROGRESS NOTES
Patient awake and in bed. Denies pain. VS and assessment completed. Denies belly pain. , discomfort, or bloating. No pain when assessing lower abdomen. Patient voided and got a post void bladder scan. Patient stated he is feeling much better. Urine is clear yellow, no blood clots. Patient up to the chair at this time. Will continue to monitor.

## 2022-02-06 NOTE — PROGRESS NOTES
Writer attempted to call Dr. Blayne Zelaya to update him on bladder scans. Waiting for a call back at this time.

## 2022-02-07 ENCOUNTER — TELEPHONE (OUTPATIENT)
Dept: UROLOGY | Age: 72
End: 2022-02-07

## 2022-02-07 NOTE — TELEPHONE ENCOUNTER
Have him come in sometime this week -- Wed afternoon? We need a PVR and we will discuss work with him at that time. Keep off at least until then.

## 2022-02-07 NOTE — TELEPHONE ENCOUNTER
Contacted the patient and advised of the recent phone message. Patient voiced understanding and was scheduled for follow up on Wednesday 02/09 at 4:30 pm with Lisa Henson.

## 2022-02-09 ENCOUNTER — OFFICE VISIT (OUTPATIENT)
Dept: UROLOGY | Age: 72
End: 2022-02-09
Payer: COMMERCIAL

## 2022-02-09 VITALS
SYSTOLIC BLOOD PRESSURE: 137 MMHG | BODY MASS INDEX: 25.38 KG/M2 | HEART RATE: 94 BPM | DIASTOLIC BLOOD PRESSURE: 72 MMHG | WEIGHT: 182 LBS

## 2022-02-09 DIAGNOSIS — N13.8 BPH WITH OBSTRUCTION/LOWER URINARY TRACT SYMPTOMS: Primary | ICD-10-CM

## 2022-02-09 DIAGNOSIS — N40.1 BPH WITH OBSTRUCTION/LOWER URINARY TRACT SYMPTOMS: Primary | ICD-10-CM

## 2022-02-09 DIAGNOSIS — R33.9 URINE RETENTION: ICD-10-CM

## 2022-02-09 DIAGNOSIS — R31.0 GROSS HEMATURIA: ICD-10-CM

## 2022-02-09 PROCEDURE — 3017F COLORECTAL CA SCREEN DOC REV: CPT | Performed by: PHYSICIAN ASSISTANT

## 2022-02-09 PROCEDURE — 1123F ACP DISCUSS/DSCN MKR DOCD: CPT | Performed by: PHYSICIAN ASSISTANT

## 2022-02-09 PROCEDURE — G8484 FLU IMMUNIZE NO ADMIN: HCPCS | Performed by: PHYSICIAN ASSISTANT

## 2022-02-09 PROCEDURE — 51798 US URINE CAPACITY MEASURE: CPT | Performed by: PHYSICIAN ASSISTANT

## 2022-02-09 PROCEDURE — 99214 OFFICE O/P EST MOD 30 MIN: CPT | Performed by: PHYSICIAN ASSISTANT

## 2022-02-09 PROCEDURE — 4040F PNEUMOC VAC/ADMIN/RCVD: CPT | Performed by: PHYSICIAN ASSISTANT

## 2022-02-09 PROCEDURE — G8417 CALC BMI ABV UP PARAM F/U: HCPCS | Performed by: PHYSICIAN ASSISTANT

## 2022-02-09 PROCEDURE — 1036F TOBACCO NON-USER: CPT | Performed by: PHYSICIAN ASSISTANT

## 2022-02-09 PROCEDURE — G8427 DOCREV CUR MEDS BY ELIG CLIN: HCPCS | Performed by: PHYSICIAN ASSISTANT

## 2022-02-09 PROCEDURE — 1111F DSCHRG MED/CURRENT MED MERGE: CPT | Performed by: PHYSICIAN ASSISTANT

## 2022-02-09 ASSESSMENT — ENCOUNTER SYMPTOMS
NAUSEA: 0
COLOR CHANGE: 0
VOMITING: 0
EYE REDNESS: 0
COUGH: 0
ABDOMINAL PAIN: 0
CONSTIPATION: 0
BACK PAIN: 0
SHORTNESS OF BREATH: 0
WHEEZING: 0

## 2022-02-09 NOTE — PROGRESS NOTES
HPI:      Patient is a 71 y.o. male in no acute distress. He is alert and oriented to person, place, and time. History  Previous patient of Dr. Shantell Lee     Previous patient of Dr. Fay Luz for gross hematuria, elevated PSA, urethral stricture, and BPH.      9/2020 Referred here by Dr. Xochitl Blum for erectile dysfunction. Has tried 50 mg Viagra in the past.  This was not helpful. This was sometime ago and he is unsure if he took the medication appropriately. Offered Trimix patient declined    1/31/22 -gross hematuria-CT urogram was negative for significant  abnormalities other than an enlarged prostate. Cystoscopy: No bladder lesions, extensive trilobar prostate     PSA  11/2021 - 2.75  9/2020 - 2.61    Today:  Patient is here today for hospital follow-up. Patient did have admission for clot retention of urine following cystoscopy. Patient was in the hospital from 2/1/2022 through 2/6/2022. Patient did require CBI. Patient did require hand irrigation. Patient ultimately had minimal to no bleeding and Prather catheter was removed. Patient is here today to check PVR. Patient continues take Flomax. Patient states that he has had clear urine since his discharge but does occasionally passed small clots. He is urinating. He is feeling bloated and is having some intermittent diarrhea. His Coumadin was held while he was in the hospital.  This has not been resumed. Patient states that he has been on Coumadin for approximately 20 years. He states that he has no personal history of thrombotic events including PE, DVT or CVA. Patient relates that he is not certain why he has been on Coumadin other than his mother had multiple strokes when she was in her 76s. On chart review there is mention of hypercoagulable states:factor V Leiden and lupus anticoagulant. He states that Coumadin was started by Dr. Shalom Holden and his previous PCP Dr. Eduardo Chau.   Patient does currently follow with hematology/oncology for myeloproliferative disorder. Bladderscan performed in office today: Patient voided - 100 mL, PVR - 132 mL. Past Medical History:   Diagnosis Date    Blood disorder     Diabetes mellitus ([facility] [address]     Hypercoagulable state ([facility] [address]     Factor V Leiden and Lupus anticoagulant - on long term anticoagulation    Hypertension      Past Surgical History:   Procedure Laterality Date    CYST REMOVAL      cyst removal on back    CYSTOSCOPY      Patient states 3 or 4 years ago   [address] Right 2014    TOE SURGERY Right     bone spur right great toe     Outpatient Encounter Medications as of 2/9/2022   Medication Sig Dispense Refill    ruxolitinib phosphate (JAKAFI) 10 MG tablet TAKE 1 TABLET 2 TIMES A DAY 60 tablet 5    STEGLATRO 5 MG TABS Take 5 mg by mouth daily       tamsulosin (FLOMAX) 0.4 MG capsule take 1 capsule by mouth once daily 1/2 HOUR FOLLOWING THE SAME MEAL EACH DAY      Multiple Vitamins-Minerals (MULTIVITAMIN ADULT PO) Take 1 tablet by mouth      OMEGA-3 FATTY ACIDS-VITAMIN E PO Take 1 capsule by mouth daily       lisinopril-hydrochlorothiazide (PRINZIDE;ZESTORETIC) 20-25 MG per tablet 1 tablet daily   0    KOMBIGLYZE XR 5-1000 MG TB24 daily   0    primidone (MYSOLINE) 50 MG tablet 50 mg Take 3 tablets in the morning and 3 tablets in the evening  0    RA OMEPRAZOLE 20 MG EC tablet Take 20 mg by mouth daily   0    ferrous sulfate (IRON 325) 325 (65 Fe) MG tablet Take 1 tablet by mouth daily 30 tablet 1     No facility-administered encounter medications on file as of 2/9/2022.       Current Outpatient Medications on File Prior to Visit   Medication Sig Dispense Refill    ruxolitinib phosphate (JAKAFI) 10 MG tablet TAKE 1 TABLET 2 TIMES A DAY 60 tablet 5    STEGLATRO 5 MG TABS Take 5 mg by mouth daily       tamsulosin (FLOMAX) 0.4 MG capsule take 1 capsule by mouth once daily 1/2 HOUR FOLLOWING THE SAME MEAL EACH DAY      Multiple Vitamins-Minerals (MULTIVITAMIN ADULT PO) Take 1 tablet by mouth      OMEGA-3 FATTY ACIDS-VITAMIN E PO Take 1 capsule by mouth daily       lisinopril-hydrochlorothiazide (PRINZIDE;ZESTORETIC) 20-25 MG per tablet 1 tablet daily   0    KOMBIGLYZE XR 5-1000 MG TB24 daily   0    primidone (MYSOLINE) 50 MG tablet 50 mg Take 3 tablets in the morning and 3 tablets in the evening  0    RA OMEPRAZOLE 20 MG EC tablet Take 20 mg by mouth daily   0    ferrous sulfate (IRON 325) 325 (65 Fe) MG tablet Take 1 tablet by mouth daily 30 tablet 1     No current facility-administered medications on file prior to visit. Patient has no known allergies. History reviewed. No pertinent family history. Social History     Tobacco Use   Smoking Status Former Smoker    Packs/day: 0.50    Years: 15.00    Pack years: 7.50    Quit date: 1981    Years since quittin.1   Smokeless Tobacco Never Used   Tobacco Comment    quit smoking 38 years ago       Social History     Substance and Sexual Activity   Alcohol Use Not Currently    Comment: Quit drinking       Review of Systems   Constitutional: Negative for appetite change, chills and fever. Eyes: Negative for redness and visual disturbance. Respiratory: Negative for cough, shortness of breath and wheezing. Cardiovascular: Negative for chest pain and leg swelling. Gastrointestinal: Negative for abdominal pain, constipation, nausea and vomiting. Genitourinary: Negative for decreased urine volume, difficulty urinating, dysuria, enuresis, flank pain, frequency, penile discharge, penile pain, scrotal swelling, testicular pain and urgency. Rare tiny clots otherwise clear urine   Musculoskeletal: Negative for back pain, joint swelling and myalgias. Skin: Negative for color change, rash and wound. Neurological: Negative for dizziness, tremors and numbness. Hematological: Negative for adenopathy. Does not bruise/bleed easily.        /72 (Site: Right Upper Arm, Position: Sitting, Cuff Size: Large Adult) Pulse 94   Wt 182 lb (82.6 kg)   BMI 25.38 kg/m²       PHYSICAL EXAM:  Constitutional: Patient in no acute distress; Neuro: alert and oriented to person place and time. Psych: Mood and affect normal.  Lungs: Respiratory effort normal  Abdomen: Soft, non-tender, non-distende  Rectal: Deferred      Lab Results   Component Value Date    BUN 20 02/06/2022     Lab Results   Component Value Date    CREATININE 0.86 02/06/2022     Lab Results   Component Value Date    PSA 2.75 11/03/2021    PSA 2.61 09/18/2020       ASSESSMENT:   Diagnosis Orders   1. BPH with obstruction/lower urinary tract symptoms  TX MEASUREMENT,POST-VOID RESIDUAL VOLUME BY US,NON-IMAGING   2. Gross hematuria     3. Urine retention         PLAN:  We do need guidance from hematology/oncology (already established) in regards to anticoagulation and when to resume Coumadin. We do understand that his mother had many strokes prior to her death and that patient has been diagnosed with hypercoagulable conditions of factor V Leiden and lupus anticoagulant. Our office will reach out to the hematology oncology office for their opinion and hopefully move up his appointment to weigh in on this to ensure that he is protected from thrombotic event. Continue Flomax    We do want him to follow-up with us in 3 to 4 weeks for recheck. If patient develops any inability to urinate or develops suprapubic pain he is to contact her office versus going to emergency room.     Follow-up in 3 to 4 weeks with PVR    PSA follow-up 11/2022

## 2022-02-10 ENCOUNTER — TELEPHONE (OUTPATIENT)
Dept: UROLOGY | Age: 72
End: 2022-02-10

## 2022-02-10 NOTE — TELEPHONE ENCOUNTER
Oncology called and patient's appointment has been moved up to 3/2/22 (first available). Also discussed with Shira Alanis regarding whether patient should restart coumadin and when? She said she will look into this further and give urology a call back.

## 2022-02-11 ENCOUNTER — TELEPHONE (OUTPATIENT)
Dept: ONCOLOGY | Age: 72
End: 2022-02-11

## 2022-02-11 NOTE — TELEPHONE ENCOUNTER
Called the office of Dr. Marium Carr and left voice mail informing that patient was recently hospitalized for hematuria and warfarin was put on hold. Dr. Shant Luz advises patient get back on anticoagulation. Requested that they reach out to patient regarding anticoagulation. Also called patient to inform that he needs to go back on warfarin. Patient states he will call Dr. Ethel Mclean office.

## 2022-02-11 NOTE — TELEPHONE ENCOUNTER
Message left on office phone from oncology stating that after consulting a provider the patient does need to be on coumadin. Charlotte Self from oncology said she will contact patient's PCP regarding coumadin dose.

## 2022-02-24 ENCOUNTER — HOSPITAL ENCOUNTER (OUTPATIENT)
Age: 72
Discharge: HOME | End: 2022-02-24
Payer: COMMERCIAL

## 2022-02-24 DIAGNOSIS — D47.1 MYELOPROLIFERATIVE DISORDER (HCC): ICD-10-CM

## 2022-02-24 DIAGNOSIS — D68.59 HYPERCOAGULABLE STATE (HCC): ICD-10-CM

## 2022-02-24 DIAGNOSIS — D45 POLYCYTHEMIA RUBRA VERA (HCC): Primary | ICD-10-CM

## 2022-02-24 DIAGNOSIS — D45 POLYCYTHEMIA RUBRA VERA (HCC): ICD-10-CM

## 2022-02-24 LAB
ABSOLUTE EOS #: 0 K/UL (ref 0–0.44)
ABSOLUTE IMMATURE GRANULOCYTE: 0.59 K/UL (ref 0–0.3)
ABSOLUTE LYMPH #: 1.1 K/UL (ref 1.1–3.7)
ABSOLUTE MONO #: 0.15 K/UL (ref 0.1–1.2)
ALBUMIN SERPL-MCNC: 4.5 G/DL (ref 3.5–5.2)
ALBUMIN/GLOBULIN RATIO: 1.7 (ref 1–2.5)
ALP BLD-CCNC: 106 U/L (ref 40–129)
ALT SERPL-CCNC: 20 U/L (ref 5–41)
ANION GAP SERPL CALCULATED.3IONS-SCNC: 14 MMOL/L (ref 9–17)
AST SERPL-CCNC: 22 U/L
BASOPHILS # BLD: 2 % (ref 0–2)
BASOPHILS ABSOLUTE: 0.15 K/UL (ref 0–0.2)
BILIRUB SERPL-MCNC: 0.3 MG/DL (ref 0.3–1.2)
BUN BLDV-MCNC: 29 MG/DL (ref 8–23)
BUN/CREAT BLD: 22 (ref 9–20)
CALCIUM SERPL-MCNC: 9.4 MG/DL (ref 8.6–10.4)
CHLORIDE BLD-SCNC: 93 MMOL/L (ref 98–107)
CO2: 23 MMOL/L (ref 20–31)
CREAT SERPL-MCNC: 1.29 MG/DL (ref 0.7–1.2)
EOSINOPHILS RELATIVE PERCENT: 0 % (ref 1–4)
GFR AFRICAN AMERICAN: >60 ML/MIN
GFR NON-AFRICAN AMERICAN: 55 ML/MIN
GFR SERPL CREATININE-BSD FRML MDRD: ABNORMAL ML/MIN/{1.73_M2}
GFR SERPL CREATININE-BSD FRML MDRD: ABNORMAL ML/MIN/{1.73_M2}
GLUCOSE BLD-MCNC: 217 MG/DL (ref 70–99)
HCT VFR BLD CALC: 32.9 % (ref 40.7–50.3)
HEMOGLOBIN: 10.2 G/DL (ref 13–17)
IMMATURE GRANULOCYTES: 8 %
INR BLD: 2.4
LYMPHOCYTES # BLD: 15 % (ref 24–43)
MCH RBC QN AUTO: 24.9 PG (ref 25.2–33.5)
MCHC RBC AUTO-ENTMCNC: 31 G/DL (ref 28.4–34.8)
MCV RBC AUTO: 80.4 FL (ref 82.6–102.9)
MONOCYTES # BLD: 2 % (ref 3–12)
MORPHOLOGY: NORMAL
NRBC AUTOMATED: 1.5 PER 100 WBC
NUCLEATED RED BLOOD CELLS: 1 PER 100 WBC (ref 0–5)
PDW BLD-RTO: 17.3 % (ref 11.8–14.4)
PLATELET # BLD: 147 K/UL (ref 138–453)
PMV BLD AUTO: 9.3 FL (ref 8.1–13.5)
POTASSIUM SERPL-SCNC: 4.1 MMOL/L (ref 3.7–5.3)
PROTHROMBIN TIME: 25.6 SEC (ref 11.5–14.2)
RBC # BLD: 4.09 M/UL (ref 4.21–5.77)
SEG NEUTROPHILS: 73 % (ref 36–65)
SEGMENTED NEUTROPHILS ABSOLUTE COUNT: 5.34 K/UL (ref 1.5–8.1)
SODIUM BLD-SCNC: 130 MMOL/L (ref 135–144)
TOTAL PROTEIN: 7.1 G/DL (ref 6.4–8.3)
WBC # BLD: 7.3 K/UL (ref 3.5–11.3)

## 2022-02-24 PROCEDURE — 80053 COMPREHEN METABOLIC PANEL: CPT

## 2022-02-24 PROCEDURE — 85025 COMPLETE CBC W/AUTO DIFF WBC: CPT

## 2022-02-24 PROCEDURE — 36415 COLL VENOUS BLD VENIPUNCTURE: CPT

## 2022-02-24 PROCEDURE — 85610 PROTHROMBIN TIME: CPT

## 2022-03-02 ENCOUNTER — OFFICE VISIT (OUTPATIENT)
Dept: ONCOLOGY | Age: 72
End: 2022-03-02
Payer: COMMERCIAL

## 2022-03-02 VITALS
WEIGHT: 180 LBS | HEART RATE: 81 BPM | BODY MASS INDEX: 25.1 KG/M2 | DIASTOLIC BLOOD PRESSURE: 52 MMHG | SYSTOLIC BLOOD PRESSURE: 135 MMHG | TEMPERATURE: 97.6 F | RESPIRATION RATE: 18 BRPM

## 2022-03-02 DIAGNOSIS — D75.81 MYELOFIBROSIS (HCC): ICD-10-CM

## 2022-03-02 DIAGNOSIS — D45 POLYCYTHEMIA RUBRA VERA (HCC): Primary | ICD-10-CM

## 2022-03-02 DIAGNOSIS — D68.59 HYPERCOAGULABLE STATE (HCC): ICD-10-CM

## 2022-03-02 DIAGNOSIS — D47.1 MYELOPROLIFERATIVE DISORDER (HCC): ICD-10-CM

## 2022-03-02 PROCEDURE — G8484 FLU IMMUNIZE NO ADMIN: HCPCS | Performed by: INTERNAL MEDICINE

## 2022-03-02 PROCEDURE — 3017F COLORECTAL CA SCREEN DOC REV: CPT | Performed by: INTERNAL MEDICINE

## 2022-03-02 PROCEDURE — G8427 DOCREV CUR MEDS BY ELIG CLIN: HCPCS | Performed by: INTERNAL MEDICINE

## 2022-03-02 PROCEDURE — 1111F DSCHRG MED/CURRENT MED MERGE: CPT | Performed by: INTERNAL MEDICINE

## 2022-03-02 PROCEDURE — 99214 OFFICE O/P EST MOD 30 MIN: CPT | Performed by: INTERNAL MEDICINE

## 2022-03-02 PROCEDURE — 4040F PNEUMOC VAC/ADMIN/RCVD: CPT | Performed by: INTERNAL MEDICINE

## 2022-03-02 PROCEDURE — G8417 CALC BMI ABV UP PARAM F/U: HCPCS | Performed by: INTERNAL MEDICINE

## 2022-03-02 PROCEDURE — 1036F TOBACCO NON-USER: CPT | Performed by: INTERNAL MEDICINE

## 2022-03-02 PROCEDURE — 1123F ACP DISCUSS/DSCN MKR DOCD: CPT | Performed by: INTERNAL MEDICINE

## 2022-03-09 ENCOUNTER — HOSPITAL ENCOUNTER (OUTPATIENT)
Age: 72
Setting detail: SPECIMEN
Discharge: HOME | End: 2022-03-09
Payer: COMMERCIAL

## 2022-03-09 ENCOUNTER — OFFICE VISIT (OUTPATIENT)
Dept: UROLOGY | Age: 72
End: 2022-03-09
Payer: COMMERCIAL

## 2022-03-09 VITALS
BODY MASS INDEX: 25.2 KG/M2 | WEIGHT: 180 LBS | DIASTOLIC BLOOD PRESSURE: 70 MMHG | HEIGHT: 71 IN | TEMPERATURE: 97.5 F | SYSTOLIC BLOOD PRESSURE: 127 MMHG

## 2022-03-09 DIAGNOSIS — N52.9 ERECTILE DYSFUNCTION, UNSPECIFIED ERECTILE DYSFUNCTION TYPE: ICD-10-CM

## 2022-03-09 DIAGNOSIS — R33.9 URINE RETENTION: ICD-10-CM

## 2022-03-09 DIAGNOSIS — N13.8 BPH WITH OBSTRUCTION/LOWER URINARY TRACT SYMPTOMS: Primary | ICD-10-CM

## 2022-03-09 DIAGNOSIS — N40.1 BPH WITH OBSTRUCTION/LOWER URINARY TRACT SYMPTOMS: Primary | ICD-10-CM

## 2022-03-09 DIAGNOSIS — R35.1 NOCTURIA: ICD-10-CM

## 2022-03-09 PROBLEM — D75.81 MYELOFIBROSIS (HCC): Status: ACTIVE | Noted: 2022-03-09

## 2022-03-09 LAB
-: ABNORMAL
BILIRUBIN URINE: NEGATIVE
COLOR: YELLOW
EPITHELIAL CELLS UA: ABNORMAL /HPF (ref 0–5)
GLUCOSE URINE: ABNORMAL
KETONES, URINE: NEGATIVE
LEUKOCYTE ESTERASE, URINE: ABNORMAL
NITRITE, URINE: NEGATIVE
PH UA: 6 (ref 5–9)
PROTEIN UA: NEGATIVE
RBC UA: ABNORMAL /HPF (ref 0–2)
SPECIFIC GRAVITY UA: 1.01 (ref 1.01–1.02)
TURBIDITY: CLEAR
URINE HGB: ABNORMAL
UROBILINOGEN, URINE: NORMAL
WBC UA: ABNORMAL /HPF (ref 0–5)

## 2022-03-09 PROCEDURE — G8484 FLU IMMUNIZE NO ADMIN: HCPCS | Performed by: PHYSICIAN ASSISTANT

## 2022-03-09 PROCEDURE — 99214 OFFICE O/P EST MOD 30 MIN: CPT | Performed by: PHYSICIAN ASSISTANT

## 2022-03-09 PROCEDURE — 1036F TOBACCO NON-USER: CPT | Performed by: PHYSICIAN ASSISTANT

## 2022-03-09 PROCEDURE — 51798 US URINE CAPACITY MEASURE: CPT | Performed by: PHYSICIAN ASSISTANT

## 2022-03-09 PROCEDURE — 87186 SC STD MICRODIL/AGAR DIL: CPT

## 2022-03-09 PROCEDURE — 1123F ACP DISCUSS/DSCN MKR DOCD: CPT | Performed by: PHYSICIAN ASSISTANT

## 2022-03-09 PROCEDURE — 81001 URINALYSIS AUTO W/SCOPE: CPT

## 2022-03-09 PROCEDURE — 87086 URINE CULTURE/COLONY COUNT: CPT

## 2022-03-09 PROCEDURE — 3017F COLORECTAL CA SCREEN DOC REV: CPT | Performed by: PHYSICIAN ASSISTANT

## 2022-03-09 PROCEDURE — 87077 CULTURE AEROBIC IDENTIFY: CPT

## 2022-03-09 PROCEDURE — G8427 DOCREV CUR MEDS BY ELIG CLIN: HCPCS | Performed by: PHYSICIAN ASSISTANT

## 2022-03-09 PROCEDURE — G8417 CALC BMI ABV UP PARAM F/U: HCPCS | Performed by: PHYSICIAN ASSISTANT

## 2022-03-09 PROCEDURE — 4040F PNEUMOC VAC/ADMIN/RCVD: CPT | Performed by: PHYSICIAN ASSISTANT

## 2022-03-09 RX ORDER — LEVOFLOXACIN 500 MG/1
500 TABLET, FILM COATED ORAL DAILY
Qty: 10 TABLET | Refills: 0 | Status: SHIPPED | OUTPATIENT
Start: 2022-03-09 | End: 2022-03-19

## 2022-03-09 RX ORDER — RUXOLITINIB 10 MG/1
TABLET ORAL
COMMUNITY
End: 2022-04-01 | Stop reason: SDUPTHER

## 2022-03-09 RX ORDER — OMEPRAZOLE 40 MG/1
CAPSULE, DELAYED RELEASE ORAL
COMMUNITY
Start: 2022-03-03 | End: 2022-03-09

## 2022-03-09 ASSESSMENT — ENCOUNTER SYMPTOMS
BACK PAIN: 0
WHEEZING: 0
VOMITING: 0
COLOR CHANGE: 0
NAUSEA: 0
EYE REDNESS: 0
CONSTIPATION: 0
SHORTNESS OF BREATH: 0
ABDOMINAL PAIN: 0
COUGH: 0

## 2022-03-09 NOTE — PATIENT INSTRUCTIONS
SURVEY:    You may be receiving a survey from [employer] regarding your visit today. Please complete the survey to enable us to provide the highest quality of care to you and your family. If you cannot score us a very good on any question, please call the office to discuss how we could have made your experience a very good one. Thank you.

## 2022-03-09 NOTE — PROGRESS NOTES
Random bladderscan performed in office today:  Pt last voided 60 mins ago, scan = 319 mL    Then voided  200 mLS.

## 2022-03-09 NOTE — PROGRESS NOTES
HPI:      Patient is a 71 y.o. male in no acute distress. He is alert and oriented to person, place, and time. History  Previous patient of Dr. Kristen Owens     Previous patient of Dr. Sundar Rivero for gross hematuria, elevated PSA, urethral stricture, and BPH.      9/2020 Referred here by Dr. Mitch Cano for erectile dysfunction. Has tried 50 mg Viagra in the past.  This was not helpful. This was sometime ago and he is unsure if he took the medication appropriately. Offered Trimix patient declined    1/31/22 -gross hematuria-CT urogram was negative for significant  abnormalities other than an enlarged prostate. Cystoscopy: No bladder lesions, extensive trilobar prostate     PSA  11/2021 - 2.75  9/2020 - 2.61    Today:  Patient is here today for follow-up. Patient did have a hospital stay within the past month following a cystoscopy for hematuria and clot retention. Patient has been doing well since he was last seen. He denies any fever, chills, gross hematuria, flank pain, dysuria. He does however state that prior to having cystoscopy he only had nocturia x1. Since his hospitalization and Prather catheter removal he has had nocturia x4. He continues to take Flomax. He feels as though he is emptying well. Random bladderscan performed in office today:  Pt last voided 60 mins ago, scan = 319 mL Then voided  200 ml.     Past Medical History:   Diagnosis Date    Blood disorder     Diabetes mellitus ([facility] [address]     Hypercoagulable state ([facility] [address]     Factor V Leiden and Lupus anticoagulant - on long term anticoagulation    Hypertension      Past Surgical History:   Procedure Laterality Date    CYST REMOVAL      cyst removal on back    CYSTOSCOPY      Patient states 3 or 4 years ago   [address] Right 2014    TOE SURGERY Right     bone spur right great toe     Outpatient Encounter Medications as of 3/9/2022   Medication Sig Dispense Refill    Aspirin 81 MG CAPS chew 1 tablet by oral route  every day  ruxolitinib phosphate (JAKAFI) 10 MG tablet       levoFLOXacin (LEVAQUIN) 500 MG tablet Take 1 tablet by mouth daily for 10 days 10 tablet 0    STEGLATRO 5 MG TABS Take 5 mg by mouth daily       tamsulosin (FLOMAX) 0.4 MG capsule take 1 capsule by mouth once daily 1/2 HOUR FOLLOWING THE SAME MEAL EACH DAY      Multiple Vitamins-Minerals (MULTIVITAMIN ADULT PO) Take 1 tablet by mouth      OMEGA-3 FATTY ACIDS-VITAMIN E PO Take 1 capsule by mouth daily       lisinopril-hydrochlorothiazide (PRINZIDE;ZESTORETIC) 20-25 MG per tablet 1 tablet daily   0    KOMBIGLYZE XR 5-1000 MG TB24 daily   0    primidone (MYSOLINE) 50 MG tablet 50 mg Take 3 tablets in the morning and 3 tablets in the evening  0    RA OMEPRAZOLE 20 MG EC tablet Take 20 mg by mouth daily   0    [DISCONTINUED] omeprazole (PRILOSEC) 40 MG delayed release capsule take 1 capsule by mouth once daily BEFORE A MEAL      [DISCONTINUED] ruxolitinib phosphate (JAKAFI) 10 MG tablet TAKE 1 TABLET 2 TIMES A DAY 60 tablet 5    [DISCONTINUED] ferrous sulfate (IRON 325) 325 (65 Fe) MG tablet Take 1 tablet by mouth daily 30 tablet 1     No facility-administered encounter medications on file as of 3/9/2022.       Current Outpatient Medications on File Prior to Visit   Medication Sig Dispense Refill    Aspirin 81 MG CAPS chew 1 tablet by oral route  every day      ruxolitinib phosphate (JAKAFI) 10 MG tablet       STEGLATRO 5 MG TABS Take 5 mg by mouth daily       tamsulosin (FLOMAX) 0.4 MG capsule take 1 capsule by mouth once daily 1/2 HOUR FOLLOWING THE SAME MEAL EACH DAY      Multiple Vitamins-Minerals (MULTIVITAMIN ADULT PO) Take 1 tablet by mouth      OMEGA-3 FATTY ACIDS-VITAMIN E PO Take 1 capsule by mouth daily       lisinopril-hydrochlorothiazide (PRINZIDE;ZESTORETIC) 20-25 MG per tablet 1 tablet daily   0    KOMBIGLYZE XR 5-1000 MG TB24 daily   0    primidone (MYSOLINE) 50 MG tablet 50 mg Take 3 tablets in the morning and 3 tablets in the evening  0    RA OMEPRAZOLE 20 MG EC tablet Take 20 mg by mouth daily   0     No current facility-administered medications on file prior to visit. Patient has no known allergies. History reviewed. No pertinent family history. Social History     Tobacco Use   Smoking Status Former Smoker    Packs/day: 0.50    Years: 15.00    Pack years: 7.50    Quit date: 1981    Years since quittin.2   Smokeless Tobacco Never Used   Tobacco Comment    quit smoking 38 years ago       Social History     Substance and Sexual Activity   Alcohol Use Not Currently    Comment: Quit drinking       Review of Systems   Constitutional: Negative for appetite change, chills and fever. Eyes: Negative for redness and visual disturbance. Respiratory: Negative for cough, shortness of breath and wheezing. Cardiovascular: Negative for chest pain and leg swelling. Gastrointestinal: Negative for abdominal pain, constipation, nausea and vomiting. Genitourinary: Negative for decreased urine volume, difficulty urinating, dysuria, enuresis, flank pain, frequency, hematuria, penile discharge, penile pain, scrotal swelling, testicular pain and urgency. Positive for nocturia   Musculoskeletal: Negative for back pain, joint swelling and myalgias. Skin: Negative for color change, rash and wound. Neurological: Negative for dizziness, tremors and numbness. Hematological: Negative for adenopathy. Does not bruise/bleed easily. /70 (Site: Right Upper Arm, Position: Sitting, Cuff Size: Medium Adult)   Temp 97.5 °F (36.4 °C) (Infrared)   Ht 5' 11\" (1.803 m)   Wt 180 lb (81.6 kg)   BMI 25.10 kg/m²       PHYSICAL EXAM:  Constitutional: Patient in no acute distress; Neuro: alert and oriented to person place and time.     Psych: Mood and affect normal.  Lungs: Respiratory effort normal  Abdomen: Soft, non-tender, non-distended  Rectal: Deferred      Lab Results   Component Value Date    BUN 29 (H) 2022 Lab Results   Component Value Date    CREATININE 1.29 (H) 02/24/2022     Lab Results   Component Value Date    PSA 2.75 11/03/2021    PSA 2.61 09/18/2020       ASSESSMENT:   Diagnosis Orders   1. BPH with obstruction/lower urinary tract symptoms  FL MEASUREMENT,POST-VOID RESIDUAL VOLUME BY US,NON-IMAGING   2. Urine retention     3. Erectile dysfunction, unspecified erectile dysfunction type     4. Nocturia  Urinalysis with Microscopic    Culture, Urine         PLAN:  We will check a urinalysis and culture. We will call him with results. We will continue antibiotics even if his urine culture is negative for UTI. We will give patient a course of Levaquin for 10 days for prostatitis. Patient did have a catheter in for a period of time. He does state that prior to having the catheter placed he was only having nocturia 1 time. Now he has it 4 times.     Continue Flomax    Follow-up in 6 weeks with PVR

## 2022-03-09 NOTE — PROGRESS NOTES
Chief Complaint   Patient presents with    Follow-up     polycythemia per request urogy hx of blood clots hx of coumadin recent hospital stay       DIAGNOSIS:   1. History of myeloproliferative disorder , Malcom 2+, likely polycythemia diagnosed in 2016  2. Factor V Leiden mutation and lupus anticoagulant  3. Spent phase polycythemia/myelofibrosis transformation diagnosed in 2019  CURRENT THERAPY:  1. Started hydroxyurea and aspirin  2. Upon diagnosis of myelofibrosis, transition to [facility]  3. Long-term anticoagulation with Coumadin  BRIEF CASE HISTORY:   Hiral Triana is a very pleasant 71 y.o. male who was followed by different hematologist, he was diagnosed with myeloproliferative syndrome likely polycythemia vera where he presented with polycythemia and thrombocytosis and he was Malcom 2+. He was started on hydroxyurea for multiple years. In 2019, he started having worsening cytopenias and bone marrow aspiration biopsy showed hypercellular marrow but increased megakaryocyte and increased fibrotic changes suggestive of spent phase polycythemia/myelofibrosis. He was started on Jakafi. The patient also has a history of hypercoagulable condition namely factor V Leiden and lupus anticoagulant and he is on long-term anticoagulation. INTERIM HISTORY:  The patient comes in today for a follow up, feeling well and doing well . He is on [facility]. Tolerated well. No side effects. He is feeling much better and he is relieved that both of them are doing well. Physically he is doing okay. INR has been therapeutic. He is on Jakafi and that is well-tolerated. PAST MEDICAL HISTORY: has a past medical history of Blood disorder, Diabetes mellitus ([address] Hypercoagulable state ([facility] [address] and Hypertension. PAST SURGICAL HISTORY: has a past surgical history that includes Rotator cuff repair (Right, 2014); Cystoscopy;  Toe Surgery (Right); and cyst removal.     CURRENT MEDICATIONS:  has a current medication list which includes the following prescription(s): steglatro, tamsulosin, multiple vitamin, omega-3 fatty acids-vitamin e, lisinopril-hydrochlorothiazide, kombiglyze xr, primidone, ra omeprazole, aspirin, jakafi, and levofloxacin. ALLERGIES:  has No Known Allergies. FAMILY HISTORY: Negative for any hematological or oncological conditions. SOCIAL HISTORY:  reports that he quit smoking about 41 years ago. He has a 7.50 pack-year smoking history. He has never used smokeless tobacco. He reports previous alcohol use. He reports that he does not use drugs. REVIEW OF SYSTEMS:  General: no fever or night sweats, Weight is stable. ENT: No double or blurred vision, no tinnitus or hearing problem, no dysphagia or sore throat   Respiratory: No chest pain, no shortness of breath, no cough or hemoptysis. Cardiovascular: Denies chest pain, PND or orthopnea. No L E swelling or palpitations. Gastrointestinal:    No nausea or vomiting, abdominal pain, diarrhea or constipation. Genitourinary: Denies dysuria, hematuria, frequency, urgency or incontinence. Neurological: Denies headaches, decreased LOC, no sensory or motor focal deficits. Musculoskeletal:  No arthralgia no back pain or joint swelling. Skin: There are no rashes or bleeding. Easy bruisability secondary to anticoagulation  Psychiatric:  No anxiety, no depression. Endocrine: no diabetes or thyroid disease. Hematologic: no bleeding , no adenopathy. PHYSICAL EXAM: Shows a well appearing 71y.o.-year-old male who is not in pain or distress. Vital Signs: Blood pressure (!) 135/52, pulse 81, temperature 97.6 °F (36.4 °C), temperature source Temporal, resp. rate 18, weight 180 lb (81.6 kg). HEENT: Normocephalic and atraumatic. Pupils are equal, round, reactive to light and accommodation. Extraocular muscles are intact. Neck: Showed no JVD, no carotid bruit . Lungs: Clear to auscultation bilaterally.  Heart: Regular , systolic murmur unchanged abdomen: Soft, nontender. Enlarged spleen,  It seems smaller compared to my previous exam extremities: Lower extremities show no edema, clubbing, or cyanosis. Breasts: Examination not done today. Neuro exam: intact cranial nerves bilaterally no motor or sensory deficit, gait is normal. Lymphatic: no adenopathy appreciated in the supraclavicular, axillary, cervical or inguinal area    Review of radiological studies:     Review of laboratory data:   Lab Results   Component Value Date    WBC 7.3 02/24/2022    HGB 10.2 (L) 02/24/2022    HCT 32.9 (L) 02/24/2022    MCV 80.4 (L) 02/24/2022     02/24/2022       Chemistry        Component Value Date/Time     (L) 02/24/2022 1346    K 4.1 02/24/2022 1346    CL 93 (L) 02/24/2022 1346    CO2 23 02/24/2022 1346    BUN 29 (H) 02/24/2022 1346    CREATININE 1.29 (H) 02/24/2022 1346        Component Value Date/Time    CALCIUM 9.4 02/24/2022 1346    ALKPHOS 106 02/24/2022 1346    AST 22 02/24/2022 1346    ALT 20 02/24/2022 1346    BILITOT 0.30 02/24/2022 1346          IMPRESSION:   1. History of polycythemia  2. Spent phase  polycythemia/myelofibrosis  3. Hypercoagulable condition factor V Leiden and lupus anticoagulant  Plan:   The patient is doing very well on Jakafi. Hemoglobin and hematocrit are at target  Has mild thrombocytopenia which is stable  Continue supportive care, he is doing very well. Continue current therapy until progression. His spleen is definitely getting smaller by my clinical exam  RV in 2 months with labs. [id number]                            This note is created with the assistance of a speech recognition program.  While intending to generate a document that actually reflects the content of the visit, the document can still have some errors including those of syntax and sound a like substitutions which may escape proof reading.   It such instances, actual meaning can be extrapolated by contextual diversion.

## 2022-03-11 LAB
CULTURE: ABNORMAL
SPECIMEN DESCRIPTION: ABNORMAL

## 2022-03-14 ENCOUNTER — TELEPHONE (OUTPATIENT)
Dept: UROLOGY | Age: 72
End: 2022-03-14

## 2022-03-14 NOTE — TELEPHONE ENCOUNTER
Please let him know that he does have a urinary tract infection. Continue the Levaquin that I gave him in the office as this does cover the infection.

## 2022-03-14 NOTE — TELEPHONE ENCOUNTER
Patient made aware of positive urine culture results and and to finish the antibiotics. Patient voiced understanding.

## 2022-04-01 RX ORDER — RUXOLITINIB 10 MG/1
10 TABLET ORAL 2 TIMES DAILY
Qty: 60 TABLET | Refills: 5 | Status: SHIPPED | OUTPATIENT
Start: 2022-04-01 | End: 2022-09-21 | Stop reason: SDUPTHER

## 2022-04-20 ENCOUNTER — OFFICE VISIT (OUTPATIENT)
Dept: UROLOGY | Age: 72
End: 2022-04-20
Payer: COMMERCIAL

## 2022-04-20 VITALS
HEART RATE: 85 BPM | SYSTOLIC BLOOD PRESSURE: 156 MMHG | DIASTOLIC BLOOD PRESSURE: 69 MMHG | WEIGHT: 185 LBS | BODY MASS INDEX: 25.8 KG/M2

## 2022-04-20 DIAGNOSIS — N13.8 BPH WITH OBSTRUCTION/LOWER URINARY TRACT SYMPTOMS: Primary | ICD-10-CM

## 2022-04-20 DIAGNOSIS — R33.9 URINE RETENTION: ICD-10-CM

## 2022-04-20 DIAGNOSIS — N40.1 BPH WITH OBSTRUCTION/LOWER URINARY TRACT SYMPTOMS: Primary | ICD-10-CM

## 2022-04-20 DIAGNOSIS — K59.09 OTHER CONSTIPATION: ICD-10-CM

## 2022-04-20 PROCEDURE — 3017F COLORECTAL CA SCREEN DOC REV: CPT | Performed by: PHYSICIAN ASSISTANT

## 2022-04-20 PROCEDURE — 51798 US URINE CAPACITY MEASURE: CPT | Performed by: PHYSICIAN ASSISTANT

## 2022-04-20 PROCEDURE — G8417 CALC BMI ABV UP PARAM F/U: HCPCS | Performed by: PHYSICIAN ASSISTANT

## 2022-04-20 PROCEDURE — 99213 OFFICE O/P EST LOW 20 MIN: CPT | Performed by: PHYSICIAN ASSISTANT

## 2022-04-20 PROCEDURE — G8427 DOCREV CUR MEDS BY ELIG CLIN: HCPCS | Performed by: PHYSICIAN ASSISTANT

## 2022-04-20 PROCEDURE — 4040F PNEUMOC VAC/ADMIN/RCVD: CPT | Performed by: PHYSICIAN ASSISTANT

## 2022-04-20 PROCEDURE — 1123F ACP DISCUSS/DSCN MKR DOCD: CPT | Performed by: PHYSICIAN ASSISTANT

## 2022-04-20 PROCEDURE — 1036F TOBACCO NON-USER: CPT | Performed by: PHYSICIAN ASSISTANT

## 2022-04-20 ASSESSMENT — ENCOUNTER SYMPTOMS
ABDOMINAL PAIN: 0
VOMITING: 0
WHEEZING: 0
COLOR CHANGE: 0
EYE REDNESS: 0
NAUSEA: 0
COUGH: 0
SHORTNESS OF BREATH: 0
BACK PAIN: 0
CONSTIPATION: 1

## 2022-04-20 NOTE — PROGRESS NOTES
HPI:      Patient is a 71 y.o. male in no acute distress. He is alert and oriented to person, place, and time. History  Previous patient of Dr. Willard Aguilar     Previous patient of Dr. Chas Bella for gross hematuria, elevated PSA, urethral stricture, and BPH.      9/2020 Referred here by Dr. Massimo Sewell for erectile dysfunction. Has tried 50 mg Viagra in the past.  This was not helpful. This was sometime ago and he is unsure if he took the medication appropriately. Offered Trimix patient declined    1/31/22 -gross hematuria-CT urogram was negative for significant  abnormalities other than an enlarged prostate. Cystoscopy: No bladder lesions, extensive trilobar prostate     PSA  11/2021 - 2.75  9/2020 - 2.61    Today:  Patient is here today for follow-up BPH, nocturia, urinary retention. At last visit we gave patient a course of Levaquin for prostatitis as he did have a Prather catheter in for an extended period of time and had worsening nocturia. Patient continues to take Flomax. Bladderscan performed in office today: Pt voided - 180 mL, PVR - 106 mL. Patient states that after course of antibiotics his nocturia is now back to his baseline. Patient states that he does have hemorrhoids that he is uncomfortable right now.     Past Medical History:   Diagnosis Date    Blood disorder     Diabetes mellitus ([facility] [address]     Hypercoagulable state ([facility] [address]     Factor V Leiden and Lupus anticoagulant - on long term anticoagulation    Hypertension      Past Surgical History:   Procedure Laterality Date    CYST REMOVAL      cyst removal on back    CYSTOSCOPY      Patient states 3 or 4 years ago    ROTATOR CUFF REPAIR Right 2014    TOE SURGERY Right     bone spur right great toe     Outpatient Encounter Medications as of 4/20/2022   Medication Sig Dispense Refill    ruxolitinib phosphate (JAKAFI) 10 MG tablet Take 1 tablet by mouth 2 times daily 60 tablet 5    Aspirin 81 MG CAPS chew 1 tablet by oral route  every day  STEGLATRO 5 MG TABS Take 5 mg by mouth daily       tamsulosin (FLOMAX) 0.4 MG capsule take 1 capsule by mouth once daily 1/2 HOUR FOLLOWING THE SAME MEAL EACH DAY      Multiple Vitamins-Minerals (MULTIVITAMIN ADULT PO) Take 1 tablet by mouth      OMEGA-3 FATTY ACIDS-VITAMIN E PO Take 1 capsule by mouth daily       lisinopril-hydrochlorothiazide (PRINZIDE;ZESTORETIC) 20-25 MG per tablet 1 tablet daily   0    KOMBIGLYZE XR 5-1000 MG TB24 daily   0    primidone (MYSOLINE) 50 MG tablet 50 mg Take 3 tablets in the morning and 3 tablets in the evening  0    RA OMEPRAZOLE 20 MG EC tablet Take 20 mg by mouth daily   0     No facility-administered encounter medications on file as of 2022. Current Outpatient Medications on File Prior to Visit   Medication Sig Dispense Refill    ruxolitinib phosphate (JAKAFI) 10 MG tablet Take 1 tablet by mouth 2 times daily 60 tablet 5    Aspirin 81 MG CAPS chew 1 tablet by oral route  every day      STEGLATRO 5 MG TABS Take 5 mg by mouth daily       tamsulosin (FLOMAX) 0.4 MG capsule take 1 capsule by mouth once daily 1/2 HOUR FOLLOWING THE SAME MEAL EACH DAY      Multiple Vitamins-Minerals (MULTIVITAMIN ADULT PO) Take 1 tablet by mouth      OMEGA-3 FATTY ACIDS-VITAMIN E PO Take 1 capsule by mouth daily       lisinopril-hydrochlorothiazide (PRINZIDE;ZESTORETIC) 20-25 MG per tablet 1 tablet daily   0    KOMBIGLYZE XR 5-1000 MG TB24 daily   0    primidone (MYSOLINE) 50 MG tablet 50 mg Take 3 tablets in the morning and 3 tablets in the evening  0    RA OMEPRAZOLE 20 MG EC tablet Take 20 mg by mouth daily   0     No current facility-administered medications on file prior to visit. Patient has no known allergies. History reviewed. No pertinent family history.   Social History     Tobacco Use   Smoking Status Former Smoker    Packs/day: 0.50    Years: 15.00    Pack years: 7.50    Quit date: 1981    Years since quittin.3   Smokeless Tobacco Never Used   Tobacco Comment    quit smoking 38 years ago       Social History     Substance and Sexual Activity   Alcohol Use Not Currently    Comment: Quit drinking       Review of Systems   Constitutional: Negative for appetite change, chills and fever. Eyes: Negative for redness and visual disturbance. Respiratory: Negative for cough, shortness of breath and wheezing. Cardiovascular: Negative for chest pain and leg swelling. Gastrointestinal: Positive for constipation. Negative for abdominal pain, nausea and vomiting. Genitourinary: Negative for decreased urine volume, difficulty urinating, dysuria, enuresis, flank pain, frequency, hematuria, penile discharge, penile pain, scrotal swelling, testicular pain and urgency. Positive for nocturia x1   Musculoskeletal: Negative for back pain, joint swelling and myalgias. Skin: Negative for color change, rash and wound. Neurological: Negative for dizziness, tremors and numbness. Hematological: Negative for adenopathy. Does not bruise/bleed easily. BP (!) 156/69   Pulse 85   Wt 185 lb (83.9 kg)   BMI 25.80 kg/m²       PHYSICAL EXAM:  Constitutional: Patient in no acute distress; Neuro: alert and oriented to person place and time. Psych: Mood and affect normal.  Lungs: Respiratory effort normal  Cardiovascular:  Normal peripheral pulses  Abdomen: Soft, non-tender, non-distended  Rectal: Deferred    Lab Results   Component Value Date    BUN 29 (H) 02/24/2022     Lab Results   Component Value Date    CREATININE 1.29 (H) 02/24/2022     Lab Results   Component Value Date    PSA 2.75 11/03/2021    PSA 2.61 09/18/2020       ASSESSMENT:   Diagnosis Orders   1. BPH with obstruction/lower urinary tract symptoms  VT MEASUREMENT,POST-VOID RESIDUAL VOLUME BY US,NON-IMAGING   2. Urine retention     3.  Other constipation           PLAN:  Continue Flomax    Recommended stool softeners and/or miralax -instructions given    Over-the-counter Preparation H    Follow-up 11/2022 for PSA check and PVR    Gross hematuria follow-up 1/2023

## 2022-05-05 ENCOUNTER — HOSPITAL ENCOUNTER (OUTPATIENT)
Age: 72
Discharge: HOME | End: 2022-05-05
Payer: COMMERCIAL

## 2022-05-05 DIAGNOSIS — D45 POLYCYTHEMIA RUBRA VERA (HCC): ICD-10-CM

## 2022-05-05 LAB
ABSOLUTE EOS #: 0 K/UL (ref 0–0.44)
ABSOLUTE IMMATURE GRANULOCYTE: 0.29 K/UL (ref 0–0.3)
ABSOLUTE LYMPH #: 1.26 K/UL (ref 1.1–3.7)
ABSOLUTE MONO #: 0.97 K/UL (ref 0.1–1.2)
ALBUMIN SERPL-MCNC: 5.1 G/DL (ref 3.5–5.2)
ALBUMIN/GLOBULIN RATIO: 2.1 (ref 1–2.5)
ALP BLD-CCNC: 97 U/L (ref 40–129)
ALT SERPL-CCNC: 19 U/L (ref 5–41)
ANION GAP SERPL CALCULATED.3IONS-SCNC: 10 MMOL/L (ref 9–17)
AST SERPL-CCNC: 24 U/L
BASOPHILS # BLD: 0 % (ref 0–2)
BASOPHILS ABSOLUTE: 0 K/UL (ref 0–0.2)
BILIRUB SERPL-MCNC: 0.52 MG/DL (ref 0.3–1.2)
BUN BLDV-MCNC: 22 MG/DL (ref 8–23)
BUN/CREAT BLD: 21 (ref 9–20)
CALCIUM SERPL-MCNC: 9.7 MG/DL (ref 8.6–10.4)
CHLORIDE BLD-SCNC: 96 MMOL/L (ref 98–107)
CO2: 26 MMOL/L (ref 20–31)
CREAT SERPL-MCNC: 1.03 MG/DL (ref 0.7–1.2)
EOSINOPHILS RELATIVE PERCENT: 0 % (ref 1–4)
GFR AFRICAN AMERICAN: >60 ML/MIN
GFR NON-AFRICAN AMERICAN: >60 ML/MIN
GFR SERPL CREATININE-BSD FRML MDRD: ABNORMAL ML/MIN/{1.73_M2}
GFR SERPL CREATININE-BSD FRML MDRD: ABNORMAL ML/MIN/{1.73_M2}
GLUCOSE BLD-MCNC: 114 MG/DL (ref 70–99)
HCT VFR BLD CALC: 40.3 % (ref 40.7–50.3)
HEMOGLOBIN: 11.8 G/DL (ref 13–17)
IMMATURE GRANULOCYTES: 3 %
INR BLD: 2.1
LYMPHOCYTES # BLD: 13 % (ref 24–43)
MCH RBC QN AUTO: 20.4 PG (ref 25.2–33.5)
MCHC RBC AUTO-ENTMCNC: 29.3 G/DL (ref 28.4–34.8)
MCV RBC AUTO: 69.6 FL (ref 82.6–102.9)
MONOCYTES # BLD: 10 % (ref 3–12)
MORPHOLOGY: NORMAL
MYELOCYTES ABSOLUTE COUNT: 0.1 K/UL
MYELOCYTES: 1 %
NRBC AUTOMATED: 2.2 PER 100 WBC
NUCLEATED RED BLOOD CELLS: 3 PER 100 WBC (ref 0–5)
PDW BLD-RTO: 20.2 % (ref 11.8–14.4)
PLATELET # BLD: 220 K/UL (ref 138–453)
PMV BLD AUTO: 9.2 FL (ref 8.1–13.5)
POTASSIUM SERPL-SCNC: 4.4 MMOL/L (ref 3.7–5.3)
PROTHROMBIN TIME: 23.6 SEC (ref 11.5–14.2)
RBC # BLD: 5.79 M/UL (ref 4.21–5.77)
SEG NEUTROPHILS: 73 % (ref 36–65)
SEGMENTED NEUTROPHILS ABSOLUTE COUNT: 7.09 K/UL (ref 1.5–8.1)
SODIUM BLD-SCNC: 132 MMOL/L (ref 135–144)
TOTAL PROTEIN: 7.5 G/DL (ref 6.4–8.3)
WBC # BLD: 9.7 K/UL (ref 3.5–11.3)

## 2022-05-05 PROCEDURE — 36415 COLL VENOUS BLD VENIPUNCTURE: CPT

## 2022-05-05 PROCEDURE — 85025 COMPLETE CBC W/AUTO DIFF WBC: CPT

## 2022-05-05 PROCEDURE — 80053 COMPREHEN METABOLIC PANEL: CPT

## 2022-05-05 PROCEDURE — 85610 PROTHROMBIN TIME: CPT

## 2022-05-11 ENCOUNTER — OFFICE VISIT (OUTPATIENT)
Dept: ONCOLOGY | Age: 72
End: 2022-05-11
Payer: COMMERCIAL

## 2022-05-11 VITALS
BODY MASS INDEX: 25.34 KG/M2 | RESPIRATION RATE: 18 BRPM | HEART RATE: 71 BPM | HEIGHT: 71 IN | WEIGHT: 181 LBS | TEMPERATURE: 96.8 F | SYSTOLIC BLOOD PRESSURE: 134 MMHG | DIASTOLIC BLOOD PRESSURE: 56 MMHG

## 2022-05-11 DIAGNOSIS — D45 POLYCYTHEMIA RUBRA VERA (HCC): ICD-10-CM

## 2022-05-11 DIAGNOSIS — D47.1 MYELOPROLIFERATIVE DISORDER (HCC): Primary | ICD-10-CM

## 2022-05-11 PROCEDURE — 99214 OFFICE O/P EST MOD 30 MIN: CPT | Performed by: INTERNAL MEDICINE

## 2022-05-11 PROCEDURE — G8417 CALC BMI ABV UP PARAM F/U: HCPCS | Performed by: INTERNAL MEDICINE

## 2022-05-11 PROCEDURE — G8427 DOCREV CUR MEDS BY ELIG CLIN: HCPCS | Performed by: INTERNAL MEDICINE

## 2022-05-11 PROCEDURE — 4040F PNEUMOC VAC/ADMIN/RCVD: CPT | Performed by: INTERNAL MEDICINE

## 2022-05-11 PROCEDURE — 1123F ACP DISCUSS/DSCN MKR DOCD: CPT | Performed by: INTERNAL MEDICINE

## 2022-05-11 PROCEDURE — 3017F COLORECTAL CA SCREEN DOC REV: CPT | Performed by: INTERNAL MEDICINE

## 2022-05-11 PROCEDURE — 1036F TOBACCO NON-USER: CPT | Performed by: INTERNAL MEDICINE

## 2022-05-11 RX ORDER — FERROUS SULFATE 325(65) MG
325 TABLET ORAL
Qty: 30 TABLET | Refills: 5 | Status: SHIPPED | OUTPATIENT
Start: 2022-05-11 | End: 2022-11-03

## 2022-05-11 RX ORDER — WARFARIN SODIUM 5 MG/1
TABLET ORAL
COMMUNITY
Start: 2022-04-22

## 2022-05-11 RX ORDER — WARFARIN SODIUM 2 MG/1
TABLET ORAL
COMMUNITY
Start: 2022-05-03

## 2022-05-19 NOTE — PROGRESS NOTES
Chief Complaint   Patient presents with    Follow-up     polycythemia       DIAGNOSIS:   1. History of myeloproliferative disorder , Malcom 2+, likely polycythemia diagnosed in 2016  2. Factor V Leiden mutation and lupus anticoagulant  3. Spent phase polycythemia/myelofibrosis transformation diagnosed in 2019  CURRENT THERAPY:  1. Started hydroxyurea and aspirin  2. Upon diagnosis of myelofibrosis, transition to [facility]  3. Long-term anticoagulation with Coumadin  BRIEF CASE HISTORY:   Tracie Seals is a very pleasant 71 y.o. male who was followed by different hematologist, he was diagnosed with myeloproliferative syndrome likely polycythemia vera where he presented with polycythemia and thrombocytosis and he was Malcom 2+. He was started on hydroxyurea for multiple years. In 2019, he started having worsening cytopenias and bone marrow aspiration biopsy showed hypercellular marrow but increased megakaryocyte and increased fibrotic changes suggestive of spent phase polycythemia/myelofibrosis. He was started on Jakafi. The patient also has a history of hypercoagulable condition namely factor V Leiden and lupus anticoagulant and he is on long-term anticoagulation. INTERIM HISTORY:  The patient comes in today for a follow up, feeling well and doing well . He is on [facility]. Tolerated well. No side effects. He is feeling much better and he is relieved that both of them are doing well. Physically he is doing okay. INR has been therapeutic. He is on Jakafi and that is well-tolerated. PAST MEDICAL HISTORY: has a past medical history of Blood disorder, Diabetes mellitus ([address] Hypercoagulable state ([facility] [address] and Hypertension. PAST SURGICAL HISTORY: has a past surgical history that includes Rotator cuff repair (Right, 2014); Cystoscopy;  Toe Surgery (Right); and cyst removal.     CURRENT MEDICATIONS:  has a current medication list which includes the following prescription(s): warfarin, warfarin, ferrous sulfate, jakafi, aspirin, steglatro, tamsulosin, multiple vitamin, omega-3 fatty acids-vitamin e, lisinopril-hydrochlorothiazide, kombiglyze xr, primidone, and ra omeprazole. ALLERGIES:  has No Known Allergies. FAMILY HISTORY: Negative for any hematological or oncological conditions. SOCIAL HISTORY:  reports that he quit smoking about 41 years ago. He has a 7.50 pack-year smoking history. He has never used smokeless tobacco. He reports previous alcohol use. He reports that he does not use drugs. REVIEW OF SYSTEMS:  General: no fever or night sweats, Weight is stable. ENT: No double or blurred vision, no tinnitus or hearing problem, no dysphagia or sore throat   Respiratory: No chest pain, no shortness of breath, no cough or hemoptysis. Cardiovascular: Denies chest pain, PND or orthopnea. No L E swelling or palpitations. Gastrointestinal:    No nausea or vomiting, abdominal pain, diarrhea or constipation. Genitourinary: Denies dysuria, hematuria, frequency, urgency or incontinence. Neurological: Denies headaches, decreased LOC, no sensory or motor focal deficits. Musculoskeletal:  No arthralgia no back pain or joint swelling. Skin: There are no rashes or bleeding. Easy bruisability secondary to anticoagulation  Psychiatric:  No anxiety, no depression. Endocrine: no diabetes or thyroid disease. Hematologic: no bleeding , no adenopathy. PHYSICAL EXAM: Shows a well appearing 71y.o.-year-old male who is not in pain or distress. Vital Signs: Blood pressure (!) 134/56, pulse 71, temperature 96.8 °F (36 °C), temperature source Temporal, resp. rate 18, height 5' 11\" (1.803 m), weight 181 lb (82.1 kg). HEENT: Normocephalic and atraumatic. Pupils are equal, round, reactive to light and accommodation. Extraocular muscles are intact. Neck: Showed no JVD, no carotid bruit . Lungs: Clear to auscultation bilaterally. Heart: Regular , systolic murmur unchanged abdomen: Soft, nontender.   Enlarged spleen, It seems smaller compared to my previous exam extremities: Lower extremities show no edema, clubbing, or cyanosis. Breasts: Examination not done today. Neuro exam: intact cranial nerves bilaterally no motor or sensory deficit, gait is normal. Lymphatic: no adenopathy appreciated in the supraclavicular, axillary, cervical or inguinal area    Review of radiological studies:     Review of laboratory data:   Lab Results   Component Value Date    WBC 9.7 05/05/2022    HGB 11.8 (L) 05/05/2022    HCT 40.3 (L) 05/05/2022    MCV 69.6 (L) 05/05/2022     05/05/2022       Chemistry        Component Value Date/Time     (L) 05/05/2022 0933    K 4.4 05/05/2022 0933    CL 96 (L) 05/05/2022 0933    CO2 26 05/05/2022 0933    BUN 22 05/05/2022 0933    CREATININE 1.03 05/05/2022 0933        Component Value Date/Time    CALCIUM 9.7 05/05/2022 0933    ALKPHOS 97 05/05/2022 0933    AST 24 05/05/2022 0933    ALT 19 05/05/2022 0933    BILITOT 0.52 05/05/2022 0933          IMPRESSION:   1. History of polycythemia  2. Spent phase  polycythemia/myelofibrosis  3. Hypercoagulable condition factor V Leiden and lupus anticoagulant  Plan:   The patient is doing very well on Jakafi. Hemoglobin and hematocrit are at target  Has mild thrombocytopenia which is stable  Continue supportive care, he is doing very well. Continue current therapy until progression. His spleen is definitely getting smaller by my clinical exam  RV in 2 months with labs. [facility]                            This note is created with the assistance of a speech recognition program.  While intending to generate a document that actually reflects the content of the visit, the document can still have some errors including those of syntax and sound a like substitutions which may escape proof reading.   It such instances, actual meaning can be extrapolated by contextual diversion.

## 2022-07-04 ENCOUNTER — HOSPITAL ENCOUNTER (OUTPATIENT)
Age: 72
Discharge: HOME | End: 2022-07-04
Payer: COMMERCIAL

## 2022-07-04 DIAGNOSIS — D47.1 MYELOPROLIFERATIVE DISORDER (HCC): ICD-10-CM

## 2022-07-04 DIAGNOSIS — D45 POLYCYTHEMIA RUBRA VERA (HCC): ICD-10-CM

## 2022-07-04 LAB
ABSOLUTE EOS #: 0.06 K/UL (ref 0–0.44)
ABSOLUTE IMMATURE GRANULOCYTE: 0.19 K/UL (ref 0–0.3)
ABSOLUTE LYMPH #: 1.02 K/UL (ref 1.1–3.7)
ABSOLUTE MONO #: 0.9 K/UL (ref 0.1–1.2)
ALBUMIN SERPL-MCNC: 5.1 G/DL (ref 3.5–5.2)
ALBUMIN/GLOBULIN RATIO: 2 (ref 1–2.5)
ALP BLD-CCNC: 82 U/L (ref 40–129)
ALT SERPL-CCNC: 23 U/L (ref 5–41)
ANION GAP SERPL CALCULATED.3IONS-SCNC: 13 MMOL/L (ref 9–17)
AST SERPL-CCNC: 26 U/L
BASOPHILS # BLD: 2 % (ref 0–2)
BASOPHILS ABSOLUTE: 0.13 K/UL (ref 0–0.2)
BILIRUB SERPL-MCNC: 0.37 MG/DL (ref 0.3–1.2)
BUN BLDV-MCNC: 33 MG/DL (ref 8–23)
BUN/CREAT BLD: 28 (ref 9–20)
CALCIUM SERPL-MCNC: 9.6 MG/DL (ref 8.6–10.4)
CHLORIDE BLD-SCNC: 98 MMOL/L (ref 98–107)
CO2: 23 MMOL/L (ref 20–31)
CREAT SERPL-MCNC: 1.16 MG/DL (ref 0.7–1.2)
EOSINOPHILS RELATIVE PERCENT: 1 % (ref 1–4)
GFR AFRICAN AMERICAN: >60 ML/MIN
GFR NON-AFRICAN AMERICAN: >60 ML/MIN
GFR SERPL CREATININE-BSD FRML MDRD: ABNORMAL ML/MIN/{1.73_M2}
GFR SERPL CREATININE-BSD FRML MDRD: ABNORMAL ML/MIN/{1.73_M2}
GLUCOSE BLD-MCNC: 137 MG/DL (ref 70–99)
HCT VFR BLD CALC: 45.9 % (ref 40.7–50.3)
HEMOGLOBIN: 13.8 G/DL (ref 13–17)
IMMATURE GRANULOCYTES: 3 %
INR BLD: 2.3
LYMPHOCYTES # BLD: 16 % (ref 24–43)
MCH RBC QN AUTO: 22.3 PG (ref 25.2–33.5)
MCHC RBC AUTO-ENTMCNC: 30.1 G/DL (ref 28.4–34.8)
MCV RBC AUTO: 74.3 FL (ref 82.6–102.9)
MONOCYTES # BLD: 14 % (ref 3–12)
MORPHOLOGY: NORMAL
NRBC AUTOMATED: 1.1 PER 100 WBC
NUCLEATED RED BLOOD CELLS: 2 PER 100 WBC (ref 0–5)
PDW BLD-RTO: 24.1 % (ref 11.8–14.4)
PLATELET # BLD: ABNORMAL K/UL (ref 138–453)
PLATELET, FLUORESCENCE: 174 K/UL (ref 138–453)
PLATELET, IMMATURE FRACTION: 5.7 % (ref 1.1–10.3)
POTASSIUM SERPL-SCNC: 4.5 MMOL/L (ref 3.7–5.3)
PROTHROMBIN TIME: 24.7 SEC (ref 11.5–14.2)
RBC # BLD: 6.18 M/UL (ref 4.21–5.77)
SEG NEUTROPHILS: 64 % (ref 36–65)
SEGMENTED NEUTROPHILS ABSOLUTE COUNT: 4.1 K/UL (ref 1.5–8.1)
SODIUM BLD-SCNC: 134 MMOL/L (ref 135–144)
TOTAL PROTEIN: 7.6 G/DL (ref 6.4–8.3)
WBC # BLD: 6.4 K/UL (ref 3.5–11.3)

## 2022-07-04 PROCEDURE — 85055 RETICULATED PLATELET ASSAY: CPT

## 2022-07-04 PROCEDURE — 80053 COMPREHEN METABOLIC PANEL: CPT

## 2022-07-04 PROCEDURE — 85610 PROTHROMBIN TIME: CPT

## 2022-07-04 PROCEDURE — 85025 COMPLETE CBC W/AUTO DIFF WBC: CPT

## 2022-07-04 PROCEDURE — 36415 COLL VENOUS BLD VENIPUNCTURE: CPT

## 2022-07-06 ENCOUNTER — OFFICE VISIT (OUTPATIENT)
Dept: ONCOLOGY | Age: 72
End: 2022-07-06
Payer: COMMERCIAL

## 2022-07-06 VITALS
WEIGHT: 183 LBS | SYSTOLIC BLOOD PRESSURE: 152 MMHG | HEART RATE: 72 BPM | DIASTOLIC BLOOD PRESSURE: 62 MMHG | TEMPERATURE: 97.5 F | HEIGHT: 71 IN | RESPIRATION RATE: 18 BRPM | BODY MASS INDEX: 25.62 KG/M2

## 2022-07-06 DIAGNOSIS — D47.1 MYELOPROLIFERATIVE DISORDER (HCC): Primary | ICD-10-CM

## 2022-07-06 DIAGNOSIS — D45 POLYCYTHEMIA RUBRA VERA (HCC): ICD-10-CM

## 2022-07-06 PROCEDURE — 1123F ACP DISCUSS/DSCN MKR DOCD: CPT | Performed by: INTERNAL MEDICINE

## 2022-07-06 PROCEDURE — 99214 OFFICE O/P EST MOD 30 MIN: CPT | Performed by: INTERNAL MEDICINE

## 2022-07-06 PROCEDURE — 3017F COLORECTAL CA SCREEN DOC REV: CPT | Performed by: INTERNAL MEDICINE

## 2022-07-06 PROCEDURE — G8427 DOCREV CUR MEDS BY ELIG CLIN: HCPCS | Performed by: INTERNAL MEDICINE

## 2022-07-06 PROCEDURE — 1036F TOBACCO NON-USER: CPT | Performed by: INTERNAL MEDICINE

## 2022-07-06 PROCEDURE — G8417 CALC BMI ABV UP PARAM F/U: HCPCS | Performed by: INTERNAL MEDICINE

## 2022-07-06 NOTE — PROGRESS NOTES
Chief Complaint   Patient presents with    Follow-up     polycythemia       DIAGNOSIS:   1. History of myeloproliferative disorder , Malcom 2+, likely polycythemia diagnosed in 2016  2. Factor V Leiden mutation and lupus anticoagulant  3. Spent phase polycythemia/myelofibrosis transformation diagnosed in 2019  CURRENT THERAPY:  1. Started hydroxyurea and aspirin  2. Upon diagnosis of myelofibrosis, transition to [facility]  3. Long-term anticoagulation with Coumadin  BRIEF CASE HISTORY:   Debbi Cantu is a very pleasant 71 y.o. male who was followed by different hematologist, he was diagnosed with myeloproliferative syndrome likely polycythemia vera where he presented with polycythemia and thrombocytosis and he was [place] 2+. He was started on hydroxyurea for multiple years. In 2019, he started having worsening cytopenias and bone marrow aspiration biopsy showed hypercellular marrow but increased megakaryocyte and increased fibrotic changes suggestive of spent phase polycythemia/myelofibrosis. He was started on Jakafi. The patient also has a history of hypercoagulable condition namely factor V Leiden and lupus anticoagulant and he is on long-term anticoagulation. INTERIM HISTORY:  The patient comes in today for a follow up, feeling well and doing well . He is on [facility]. Tolerated well. No side effects. He is feeling much better and he is relieved that both of them are doing well. Physically he is doing okay. INR has been therapeutic. PAST MEDICAL HISTORY: has a past medical history of Blood disorder, Diabetes mellitus ([facility] [address] Hypercoagulable state ([facility] [address] and Hypertension. PAST SURGICAL HISTORY: has a past surgical history that includes Rotator cuff repair (Right, 2014); Cystoscopy;  Toe Surgery (Right); and cyst removal.     CURRENT MEDICATIONS:  has a current medication list which includes the following prescription(s): warfarin, warfarin, ferrous sulfate, jakafi, aspirin, steglatro, tamsulosin, multiple vitamin, omega-3 fatty acids-vitamin e, lisinopril-hydrochlorothiazide, kombiglyze xr, primidone, and ra omeprazole. ALLERGIES:  has No Known Allergies. FAMILY HISTORY: Negative for any hematological or oncological conditions. SOCIAL HISTORY:  reports that he quit smoking about 41 years ago. He has a 7.50 pack-year smoking history. He has never used smokeless tobacco. He reports previous alcohol use. He reports that he does not use drugs. REVIEW OF SYSTEMS:  General: no fever or night sweats, Weight is stable. ENT: No double or blurred vision, no tinnitus or hearing problem, no dysphagia or sore throat   Respiratory: No chest pain, no shortness of breath, no cough or hemoptysis. Cardiovascular: Denies chest pain, PND or orthopnea. No L E swelling or palpitations. Gastrointestinal:    No nausea or vomiting, abdominal pain, diarrhea or constipation. Genitourinary: Denies dysuria, hematuria, frequency, urgency or incontinence. Neurological: Denies headaches, decreased LOC, no sensory or motor focal deficits. Musculoskeletal:  No arthralgia no back pain or joint swelling. Skin: There are no rashes or bleeding. Easy bruisability secondary to anticoagulation  Psychiatric:  No anxiety, no depression. Endocrine: no diabetes or thyroid disease. Hematologic: no bleeding , no adenopathy. PHYSICAL EXAM: Shows a well appearing 71y.o.-year-old male who is not in pain or distress. Vital Signs: Blood pressure (!) 152/62, pulse 72, temperature 97.5 °F (36.4 °C), temperature source Temporal, resp. rate 18, height 5' 11\" (1.803 m), weight 183 lb (83 kg). HEENT: Normocephalic and atraumatic. Pupils are equal, round, reactive to light and accommodation. Extraocular muscles are intact. Neck: Showed no JVD, no carotid bruit . Lungs: Clear to auscultation bilaterally. Heart: Regular , systolic murmur unchanged abdomen: Soft, nontender.   Enlarged spleen,  It seems smaller compared to my previous exam extremities: Lower extremities show no edema, clubbing, or cyanosis. Breasts: Examination not done today. Neuro exam: intact cranial nerves bilaterally no motor or sensory deficit, gait is normal. Lymphatic: no adenopathy appreciated in the supraclavicular, axillary, cervical or inguinal area    Review of radiological studies:     Review of laboratory data:   Lab Results   Component Value Date    WBC 6.4 07/04/2022    HGB 13.8 07/04/2022    HCT 45.9 07/04/2022    MCV 74.3 (L) 07/04/2022    PLT See Reflexed IPF Result 07/04/2022       Chemistry        Component Value Date/Time     (L) 07/04/2022 1019    K 4.5 07/04/2022 1019    CL 98 07/04/2022 1019    CO2 23 07/04/2022 1019    BUN 33 (H) 07/04/2022 1019    CREATININE 1.16 07/04/2022 1019        Component Value Date/Time    CALCIUM 9.6 07/04/2022 1019    ALKPHOS 82 07/04/2022 1019    AST 26 07/04/2022 1019    ALT 23 07/04/2022 1019    BILITOT 0.37 07/04/2022 1019          IMPRESSION:   1. History of polycythemia  2. Spent phase  polycythemia/myelofibrosis  3. Hypercoagulable condition factor V Leiden and lupus anticoagulant  Plan:   The patient is doing very well on Jakafi. Tolerated well with no side effects and showing very good response to treatment. Labs were reviewed. Labs are at target  Has mild thrombocytopenia which has resolved. I could not feel the spleen. Quite encouraging. Continue supportive care, he is doing very well. Continue current therapy until progression. RV in 2 months with labs. [facility]                            This note is created with the assistance of a speech recognition program.  While intending to generate a document that actually reflects the content of the visit, the document can still have some errors including those of syntax and sound a like substitutions which may escape proof reading.   It such instances, actual meaning can

## 2022-09-16 ENCOUNTER — HOSPITAL ENCOUNTER (OUTPATIENT)
Age: 72
Discharge: HOME | End: 2022-09-16
Payer: COMMERCIAL

## 2022-09-16 DIAGNOSIS — D47.1 MYELOPROLIFERATIVE DISORDER (HCC): ICD-10-CM

## 2022-09-16 DIAGNOSIS — D45 POLYCYTHEMIA RUBRA VERA (HCC): ICD-10-CM

## 2022-09-16 LAB
ABSOLUTE EOS #: 0.33 K/UL (ref 0–0.44)
ABSOLUTE IMMATURE GRANULOCYTE: 0.07 K/UL (ref 0–0.3)
ABSOLUTE LYMPH #: 1.39 K/UL (ref 1.1–3.7)
ABSOLUTE MONO #: 0.99 K/UL (ref 0.1–1.2)
ALBUMIN SERPL-MCNC: 4.7 G/DL (ref 3.5–5.2)
ALBUMIN/GLOBULIN RATIO: 2 (ref 1–2.5)
ALP BLD-CCNC: 95 U/L (ref 40–129)
ALT SERPL-CCNC: 29 U/L (ref 5–41)
ANION GAP SERPL CALCULATED.3IONS-SCNC: 14 MMOL/L (ref 9–17)
AST SERPL-CCNC: 31 U/L
BASOPHILS # BLD: 1 % (ref 0–2)
BASOPHILS ABSOLUTE: 0.07 K/UL (ref 0–0.2)
BILIRUB SERPL-MCNC: 0.5 MG/DL (ref 0.3–1.2)
BUN BLDV-MCNC: 24 MG/DL (ref 8–23)
BUN/CREAT BLD: 26 (ref 9–20)
CALCIUM SERPL-MCNC: 9.8 MG/DL (ref 8.6–10.4)
CHLORIDE BLD-SCNC: 98 MMOL/L (ref 98–107)
CO2: 23 MMOL/L (ref 20–31)
CREAT SERPL-MCNC: 0.93 MG/DL (ref 0.7–1.2)
EOSINOPHILS RELATIVE PERCENT: 5 % (ref 1–4)
GFR AFRICAN AMERICAN: >60 ML/MIN
GFR NON-AFRICAN AMERICAN: >60 ML/MIN
GFR SERPL CREATININE-BSD FRML MDRD: ABNORMAL ML/MIN/{1.73_M2}
GFR SERPL CREATININE-BSD FRML MDRD: ABNORMAL ML/MIN/{1.73_M2}
GLUCOSE BLD-MCNC: 127 MG/DL (ref 70–99)
HCT VFR BLD CALC: 52.6 % (ref 40.7–50.3)
HEMOGLOBIN: 17 G/DL (ref 13–17)
IMMATURE GRANULOCYTES: 1 %
INR BLD: 3
LYMPHOCYTES # BLD: 21 % (ref 24–43)
MCH RBC QN AUTO: 25.3 PG (ref 25.2–33.5)
MCHC RBC AUTO-ENTMCNC: 32.3 G/DL (ref 28.4–34.8)
MCV RBC AUTO: 78.4 FL (ref 82.6–102.9)
MONOCYTES # BLD: 15 % (ref 3–12)
MORPHOLOGY: ABNORMAL
NRBC AUTOMATED: 0.9 PER 100 WBC
PDW BLD-RTO: 21.6 % (ref 11.8–14.4)
PLATELET # BLD: ABNORMAL K/UL (ref 138–453)
PLATELET, FLUORESCENCE: 106 K/UL (ref 138–453)
PLATELET, IMMATURE FRACTION: 7.8 % (ref 1.1–10.3)
POTASSIUM SERPL-SCNC: 4.6 MMOL/L (ref 3.7–5.3)
PROTHROMBIN TIME: 30.6 SEC (ref 11.5–14.2)
RBC # BLD: 6.71 M/UL (ref 4.21–5.77)
SEG NEUTROPHILS: 57 % (ref 36–65)
SEGMENTED NEUTROPHILS ABSOLUTE COUNT: 3.75 K/UL (ref 1.5–8.1)
SODIUM BLD-SCNC: 135 MMOL/L (ref 135–144)
TOTAL PROTEIN: 7.1 G/DL (ref 6.4–8.3)
WBC # BLD: 6.6 K/UL (ref 3.5–11.3)

## 2022-09-16 PROCEDURE — 36415 COLL VENOUS BLD VENIPUNCTURE: CPT

## 2022-09-16 PROCEDURE — 80053 COMPREHEN METABOLIC PANEL: CPT

## 2022-09-16 PROCEDURE — 85025 COMPLETE CBC W/AUTO DIFF WBC: CPT

## 2022-09-16 PROCEDURE — 85055 RETICULATED PLATELET ASSAY: CPT

## 2022-09-16 PROCEDURE — 85610 PROTHROMBIN TIME: CPT

## 2022-09-21 ENCOUNTER — OFFICE VISIT (OUTPATIENT)
Dept: ONCOLOGY | Age: 72
End: 2022-09-21
Payer: COMMERCIAL

## 2022-09-21 VITALS
BODY MASS INDEX: 25.24 KG/M2 | RESPIRATION RATE: 18 BRPM | SYSTOLIC BLOOD PRESSURE: 118 MMHG | HEART RATE: 81 BPM | DIASTOLIC BLOOD PRESSURE: 54 MMHG | WEIGHT: 181 LBS | TEMPERATURE: 97.4 F

## 2022-09-21 DIAGNOSIS — D75.81 MYELOFIBROSIS (HCC): ICD-10-CM

## 2022-09-21 DIAGNOSIS — D68.59 HYPERCOAGULABLE STATE (HCC): ICD-10-CM

## 2022-09-21 DIAGNOSIS — D47.1 MYELOPROLIFERATIVE DISORDER (HCC): Primary | ICD-10-CM

## 2022-09-21 PROCEDURE — 3017F COLORECTAL CA SCREEN DOC REV: CPT | Performed by: INTERNAL MEDICINE

## 2022-09-21 PROCEDURE — 99214 OFFICE O/P EST MOD 30 MIN: CPT | Performed by: INTERNAL MEDICINE

## 2022-09-21 PROCEDURE — G8417 CALC BMI ABV UP PARAM F/U: HCPCS | Performed by: INTERNAL MEDICINE

## 2022-09-21 PROCEDURE — G8427 DOCREV CUR MEDS BY ELIG CLIN: HCPCS | Performed by: INTERNAL MEDICINE

## 2022-09-21 PROCEDURE — 1123F ACP DISCUSS/DSCN MKR DOCD: CPT | Performed by: INTERNAL MEDICINE

## 2022-09-21 PROCEDURE — 1036F TOBACCO NON-USER: CPT | Performed by: INTERNAL MEDICINE

## 2022-09-21 RX ORDER — RUXOLITINIB 10 MG/1
10 TABLET ORAL 2 TIMES DAILY
Qty: 60 TABLET | Refills: 5 | Status: ACTIVE | OUTPATIENT
Start: 2022-09-21

## 2022-09-21 NOTE — PROGRESS NOTES
Chief Complaint   Patient presents with    Follow-up     polycythemia       DIAGNOSIS:   History of myeloproliferative disorder , Malcom 2+, likely polycythemia diagnosed in 2016  Factor V Leiden mutation and lupus anticoagulant  Spent phase polycythemia/myelofibrosis transformation diagnosed in 2019  CURRENT THERAPY:  Started hydroxyurea and aspirin  Upon diagnosis of myelofibrosis, transition to Jakafi  Long-term anticoagulation with Coumadin  BRIEF CASE HISTORY:   Merrill Irby is a very pleasant 71 y.o. male who was followed by different hematologist, he was diagnosed with myeloproliferative syndrome likely polycythemia vera where he presented with polycythemia and thrombocytosis and he was Malcom 2+. He was started on hydroxyurea for multiple years. In 2019, he started having worsening cytopenias and bone marrow aspiration biopsy showed hypercellular marrow but increased megakaryocyte and increased fibrotic changes suggestive of spent phase polycythemia/myelofibrosis. He was started on Jakafi. The patient also has a history of hypercoagulable condition namely factor V Leiden and lupus anticoagulant and he is on long-term anticoagulation. INTERIM HISTORY:  The patient comes in today for a follow up, feeling well and doing well . He is on [place]. Tolerated well. No side effects. INR has been therapeutic. Other than bruising, he has no complaints. He is fairly active without limitation    PAST MEDICAL HISTORY: has a past medical history of Blood disorder, Diabetes mellitus ([address] Hypercoagulable state ([address] and Hypertension. PAST SURGICAL HISTORY: has a past surgical history that includes Rotator cuff repair (Right, 2014); Cystoscopy;  Toe Surgery (Right); and cyst removal.     CURRENT MEDICATIONS:  has a current medication list which includes the following prescription(s): warfarin, warfarin, ferrous sulfate, jakafi, aspirin, steglatro, tamsulosin, multiple vitamin, omega-3 fatty acids-vitamin e, lisinopril-hydrochlorothiazide, kombiglyze xr, primidone, and ra omeprazole. ALLERGIES:  has No Known Allergies. FAMILY HISTORY: Negative for any hematological or oncological conditions. SOCIAL HISTORY:  reports that he quit smoking about 41 years ago. He has a 7.50 pack-year smoking history. He has never used smokeless tobacco. He reports that he does not currently use alcohol. He reports that he does not use drugs. REVIEW OF SYSTEMS:  General: no fever or night sweats, Weight is stable. ENT: No double or blurred vision, no tinnitus or hearing problem, no dysphagia or sore throat   Respiratory: No chest pain, no shortness of breath, no cough or hemoptysis. Cardiovascular: Denies chest pain, PND or orthopnea. No L E swelling or palpitations. Gastrointestinal:    No nausea or vomiting, abdominal pain, diarrhea or constipation. Genitourinary: Denies dysuria, hematuria, frequency, urgency or incontinence. Neurological: Denies headaches, decreased LOC, no sensory or motor focal deficits. Musculoskeletal:  No arthralgia no back pain or joint swelling. Skin: There are no rashes or bleeding. Easy bruisability secondary to anticoagulation  Psychiatric:  No anxiety, no depression. Endocrine: no diabetes or thyroid disease. Hematologic: no bleeding , no adenopathy. PHYSICAL EXAM: Shows a well appearing 71y.o.-year-old male who is not in pain or distress. Vital Signs: Blood pressure (!) 118/54, pulse 81, temperature 97.4 °F (36.3 °C), resp. rate 18, weight 181 lb (82.1 kg). HEENT: Normocephalic and atraumatic. Pupils are equal, round, reactive to light and accommodation. Extraocular muscles are intact. Neck: Showed no JVD, no carotid bruit . Lungs: Clear to auscultation bilaterally. Heart: Regular , systolic murmur unchanged abdomen: Soft, nontender. Enlarged spleen,  It seems smaller compared to my previous exam extremities: Lower extremities show no edema, clubbing, or cyanosis.  Breasts: Examination not done today. Neuro exam: intact cranial nerves bilaterally no motor or sensory deficit, gait is normal. Lymphatic: no adenopathy appreciated in the supraclavicular, axillary, cervical or inguinal area    Review of radiological studies:     Review of laboratory data:   Lab Results   Component Value Date    WBC 6.6 09/16/2022    HGB 17.0 09/16/2022    HCT 52.6 (H) 09/16/2022    MCV 78.4 (L) 09/16/2022    PLT See Reflexed IPF Result 09/16/2022       Chemistry        Component Value Date/Time     09/16/2022 0751    K 4.6 09/16/2022 0751    CL 98 09/16/2022 0751    CO2 23 09/16/2022 0751    BUN 24 (H) 09/16/2022 0751    CREATININE 0.93 09/16/2022 0751        Component Value Date/Time    CALCIUM 9.8 09/16/2022 0751    ALKPHOS 95 09/16/2022 0751    AST 31 09/16/2022 0751    ALT 29 09/16/2022 0751    BILITOT 0.5 09/16/2022 0751          IMPRESSION:   History of polycythemia  Spent phase  polycythemia/myelofibrosis  Hypercoagulable condition factor V Leiden and lupus anticoagulant  Plan:   The patient is doing very well. Tolerating Jakafi very well. Platelets dropped a little bit but he has not before. No symptoms. We will plan to continue current therapy without changes. Continue with Coumadin, therapeutic INR between 2 and 3  Return in 3 months    Dawit Orosco MD  Hematologist/Medical [id number] [facility] hematology oncology physicians                     This note is created with the assistance of a speech recognition program.  While intending to generate a document that actually reflects the content of the visit, the document can still have some errors including those of syntax and sound a like substitutions which may escape proof reading. It such instances, actual meaning can be extrapolated by contextual diversion.

## 2022-11-03 RX ORDER — FERROUS SULFATE 325(65) MG
TABLET ORAL
Qty: 30 TABLET | Refills: 5 | Status: SHIPPED | OUTPATIENT
Start: 2022-11-03

## 2022-11-07 ENCOUNTER — HOSPITAL ENCOUNTER (OUTPATIENT)
Age: 72
Discharge: HOME | End: 2022-11-07
Payer: COMMERCIAL

## 2022-11-07 DIAGNOSIS — Z12.5 PROSTATE CANCER SCREENING: ICD-10-CM

## 2022-11-07 PROCEDURE — 36415 COLL VENOUS BLD VENIPUNCTURE: CPT

## 2022-11-07 PROCEDURE — G0103 PSA SCREENING: HCPCS

## 2022-11-08 LAB — PROSTATE SPECIFIC ANTIGEN: 3.58 NG/ML

## 2022-11-09 ENCOUNTER — OFFICE VISIT (OUTPATIENT)
Dept: UROLOGY | Age: 72
End: 2022-11-09
Payer: COMMERCIAL

## 2022-11-09 VITALS
TEMPERATURE: 97.5 F | HEIGHT: 71 IN | HEART RATE: 69 BPM | DIASTOLIC BLOOD PRESSURE: 72 MMHG | WEIGHT: 179 LBS | SYSTOLIC BLOOD PRESSURE: 138 MMHG | BODY MASS INDEX: 25.06 KG/M2

## 2022-11-09 DIAGNOSIS — N13.8 BPH WITH OBSTRUCTION/LOWER URINARY TRACT SYMPTOMS: Primary | ICD-10-CM

## 2022-11-09 DIAGNOSIS — Z12.5 PROSTATE CANCER SCREENING: ICD-10-CM

## 2022-11-09 DIAGNOSIS — N52.9 ERECTILE DYSFUNCTION, UNSPECIFIED ERECTILE DYSFUNCTION TYPE: ICD-10-CM

## 2022-11-09 DIAGNOSIS — R31.0 GROSS HEMATURIA: ICD-10-CM

## 2022-11-09 DIAGNOSIS — N40.1 BPH WITH OBSTRUCTION/LOWER URINARY TRACT SYMPTOMS: Primary | ICD-10-CM

## 2022-11-09 PROCEDURE — 3074F SYST BP LT 130 MM HG: CPT | Performed by: PHYSICIAN ASSISTANT

## 2022-11-09 PROCEDURE — 3017F COLORECTAL CA SCREEN DOC REV: CPT | Performed by: PHYSICIAN ASSISTANT

## 2022-11-09 PROCEDURE — 51798 US URINE CAPACITY MEASURE: CPT | Performed by: PHYSICIAN ASSISTANT

## 2022-11-09 PROCEDURE — 1123F ACP DISCUSS/DSCN MKR DOCD: CPT | Performed by: PHYSICIAN ASSISTANT

## 2022-11-09 PROCEDURE — 1036F TOBACCO NON-USER: CPT | Performed by: PHYSICIAN ASSISTANT

## 2022-11-09 PROCEDURE — G8420 CALC BMI NORM PARAMETERS: HCPCS | Performed by: PHYSICIAN ASSISTANT

## 2022-11-09 PROCEDURE — G8427 DOCREV CUR MEDS BY ELIG CLIN: HCPCS | Performed by: PHYSICIAN ASSISTANT

## 2022-11-09 PROCEDURE — 3078F DIAST BP <80 MM HG: CPT | Performed by: PHYSICIAN ASSISTANT

## 2022-11-09 PROCEDURE — G8484 FLU IMMUNIZE NO ADMIN: HCPCS | Performed by: PHYSICIAN ASSISTANT

## 2022-11-09 PROCEDURE — 99213 OFFICE O/P EST LOW 20 MIN: CPT | Performed by: PHYSICIAN ASSISTANT

## 2022-11-09 ASSESSMENT — ENCOUNTER SYMPTOMS
NAUSEA: 0
COUGH: 0
BACK PAIN: 0
CONSTIPATION: 0
EYE REDNESS: 0
ABDOMINAL PAIN: 0
COLOR CHANGE: 0
WHEEZING: 0
VOMITING: 0
SHORTNESS OF BREATH: 0

## 2022-11-09 NOTE — PROGRESS NOTES
HPI:      Patient is a 71 y.o. male in no acute distress. He is alert and oriented to person, place, and time. History  Previous patient of Dr. Yair Huntley     Previous patient of Dr. Sarina Jensen for gross hematuria, elevated PSA, urethral stricture, and BPH.      9/2020 Referred here by Dr. Nila Ventura for erectile dysfunction. Has tried 50 mg Viagra in the past.  This was not helpful. This was sometime ago and he is unsure if he took the medication appropriately. Offered Trimix patient declined    1/31/22 -gross hematuria-CT urogram was negative for significant  abnormalities other than an enlarged prostate. Cystoscopy: No bladder lesions, extensive trilobar prostate   Clot retention after cystoscopy requiring hospitalization     PSA  11/2022 - 3.58  11/2021 - 2.75  9/2020 - 2.61    Today:  Patient is here today for follow-up BPH, nocturia, urinary retention, history of gross hematuria, screening PSA. Continues to take Flomax. Patient did have a PSA prior visit which was 3.58. This is up from last year which was 2.75. Patient is now 71years old. He denies any gross hematuria since his last visit. He feels that he is doing well. He has nocturia x1.   Random bladderscan performed in office today:Pt last voided 10 mins ago, scan = 42 mL     Past Medical History:   Diagnosis Date    Blood disorder     Diabetes mellitus ([facility] [address]     Hypercoagulable state ([facility] [address]     Factor V Leiden and Lupus anticoagulant - on long term anticoagulation    Hypertension      Past Surgical History:   Procedure Laterality Date    CYST REMOVAL      cyst removal on back    CYSTOSCOPY      Patient states 3 or 4 years ago    [facility] Ribe[facility] Right 2014    TOE SURGERY Right     bone spur right great toe     Outpatient Encounter Medications as of 11/9/2022   Medication Sig Dispense Refill    FEROSUL 325 (65 Fe) MG tablet take 1 tablet by mouth once daily with breakfast 30 tablet 5    ruxolitinib phosphate (JAKAFI) 10 MG tablet Take 1 tablet by mouth 2 times daily 60 tablet 5    warfarin (COUMADIN) 5 MG tablet take 1 tablet by mouth once daily      warfarin (COUMADIN) 2 MG tablet take 2 tablets by mouth once daily      Aspirin 81 MG CAPS chew 1 tablet by oral route  every day      STEGLATRO 5 MG TABS Take 5 mg by mouth daily       tamsulosin (FLOMAX) 0.4 MG capsule take 1 capsule by mouth once daily 1/2 HOUR FOLLOWING THE SAME MEAL EACH DAY      Multiple Vitamins-Minerals (MULTIVITAMIN ADULT PO) Take 1 tablet by mouth      OMEGA-3 FATTY ACIDS-VITAMIN E PO Take 1 capsule by mouth daily       lisinopril-hydrochlorothiazide (PRINZIDE;ZESTORETIC) 20-25 MG per tablet 1 tablet daily   0    KOMBIGLYZE XR 5-1000 MG TB24 daily   0    primidone (MYSOLINE) 50 MG tablet 50 mg Take 3 tablets in the morning and 3 tablets in the evening  0    RA OMEPRAZOLE 20 MG EC tablet Take 40 mg by mouth daily  0     No facility-administered encounter medications on file as of 11/9/2022.       Current Outpatient Medications on File Prior to Visit   Medication Sig Dispense Refill    FEROSUL 325 (65 Fe) MG tablet take 1 tablet by mouth once daily with breakfast 30 tablet 5    ruxolitinib phosphate (JAKAFI) 10 MG tablet Take 1 tablet by mouth 2 times daily 60 tablet 5    warfarin (COUMADIN) 5 MG tablet take 1 tablet by mouth once daily      warfarin (COUMADIN) 2 MG tablet take 2 tablets by mouth once daily      Aspirin 81 MG CAPS chew 1 tablet by oral route  every day      STEGLATRO 5 MG TABS Take 5 mg by mouth daily       tamsulosin (FLOMAX) 0.4 MG capsule take 1 capsule by mouth once daily 1/2 HOUR FOLLOWING THE SAME MEAL EACH DAY      Multiple Vitamins-Minerals (MULTIVITAMIN ADULT PO) Take 1 tablet by mouth      OMEGA-3 FATTY ACIDS-VITAMIN E PO Take 1 capsule by mouth daily       lisinopril-hydrochlorothiazide (PRINZIDE;ZESTORETIC) 20-25 MG per tablet 1 tablet daily   0    KOMBIGLYZE XR 5-1000 MG TB24 daily   0    primidone (MYSOLINE) 50 MG tablet 50 mg Take 3 tablets in the morning and 3 tablets in the evening  0    RA OMEPRAZOLE 20 MG EC tablet Take 40 mg by mouth daily  0     No current facility-administered medications on file prior to visit. Patient has no known allergies. No family history on file. Social History     Tobacco Use   Smoking Status Former    Packs/day: 0.50    Years: 15.00    Pack years: 7.50    Types: Cigarettes    Quit date: 1981    Years since quittin.8   Smokeless Tobacco Never   Tobacco Comments    quit smoking 38 years ago       Social History     Substance and Sexual Activity   Alcohol Use Not Currently    Comment: Quit drinking       Review of Systems   Constitutional:  Negative for appetite change, chills and fever. Eyes:  Negative for redness and visual disturbance. Respiratory:  Negative for cough, shortness of breath and wheezing. Cardiovascular:  Negative for chest pain and leg swelling. Gastrointestinal:  Negative for abdominal pain, constipation, nausea and vomiting. Genitourinary:  Negative for decreased urine volume, difficulty urinating, dysuria, enuresis, flank pain, frequency, hematuria, penile discharge, penile pain, scrotal swelling, testicular pain and urgency. Positive for nocturia   Musculoskeletal:  Negative for back pain, joint swelling and myalgias. Skin:  Negative for color change, rash and wound. Neurological:  Negative for dizziness, tremors and numbness. Hematological:  Negative for adenopathy. Does not bruise/bleed easily. /72 (Site: Right Upper Arm, Position: Sitting, Cuff Size: Large Adult)   Pulse 69   Temp 97.5 °F (36.4 °C)   Ht 5' 11\" (1.803 m)   Wt 179 lb (81.2 kg)   BMI 24.97 kg/m²       PHYSICAL EXAM:  Constitutional: Patient in no acute distress; Neuro: alert and oriented to person place and time.     Psych: Mood and affect normal.  Lungs: Respiratory effort normal  Abdomen: Soft, non-tender, non-distended   Rectal: deferred       Lab Results   Component Value Date BUN 24 (H) 09/16/2022     Lab Results   Component Value Date    CREATININE 0.93 09/16/2022     Lab Results   Component Value Date    PSA 3.58 11/07/2022    PSA 2.75 11/03/2021    PSA 2.61 09/18/2020       ASSESSMENT:   Diagnosis Orders   1. BPH with obstruction/lower urinary tract symptoms        2. Prostate cancer screening  PSA Screening      3. Erectile dysfunction, unspecified erectile dysfunction type        4. Gross hematuria            PLAN:  Patient is 71years old. We do look at PSA of 6.5 and greater is being concerning for prostate cancer in this age group.     Continue Flomax    Hematuria follow-up 2/2023    PSA 1 year

## 2022-12-19 ENCOUNTER — HOSPITAL ENCOUNTER (OUTPATIENT)
Age: 72
Discharge: HOME | End: 2022-12-19
Payer: COMMERCIAL

## 2022-12-19 DIAGNOSIS — D75.81 MYELOFIBROSIS (HCC): ICD-10-CM

## 2022-12-19 DIAGNOSIS — D68.59 HYPERCOAGULABLE STATE (HCC): ICD-10-CM

## 2022-12-19 DIAGNOSIS — D47.1 MYELOPROLIFERATIVE DISORDER (HCC): ICD-10-CM

## 2022-12-19 LAB
ABSOLUTE EOS #: 0 K/UL (ref 0–0.44)
ABSOLUTE IMMATURE GRANULOCYTE: 0.34 K/UL (ref 0–0.3)
ABSOLUTE LYMPH #: 0.39 K/UL (ref 1.1–3.7)
ABSOLUTE MONO #: 0.67 K/UL (ref 0.1–1.2)
ALBUMIN SERPL-MCNC: 4.9 G/DL (ref 3.5–5.2)
ALBUMIN/GLOBULIN RATIO: 2 (ref 1–2.5)
ALP BLD-CCNC: 112 U/L (ref 40–129)
ALT SERPL-CCNC: 24 U/L (ref 5–41)
ANION GAP SERPL CALCULATED.3IONS-SCNC: 9 MMOL/L (ref 9–17)
AST SERPL-CCNC: 26 U/L
BASOPHILS # BLD: 0 % (ref 0–2)
BASOPHILS ABSOLUTE: 0 K/UL (ref 0–0.2)
BILIRUB SERPL-MCNC: 0.5 MG/DL (ref 0.3–1.2)
BUN BLDV-MCNC: 25 MG/DL (ref 8–23)
BUN/CREAT BLD: 22 (ref 9–20)
CALCIUM SERPL-MCNC: 9.7 MG/DL (ref 8.6–10.4)
CHLORIDE BLD-SCNC: 98 MMOL/L (ref 98–107)
CO2: 26 MMOL/L (ref 20–31)
CREAT SERPL-MCNC: 1.12 MG/DL (ref 0.7–1.2)
EOSINOPHILS RELATIVE PERCENT: 0 % (ref 1–4)
FERRITIN: 289 NG/ML (ref 30–400)
GFR SERPL CREATININE-BSD FRML MDRD: >60 ML/MIN/1.73M2
GLUCOSE BLD-MCNC: 112 MG/DL (ref 70–99)
HCT VFR BLD CALC: 42.5 % (ref 40.7–50.3)
HEMOGLOBIN: 14.2 G/DL (ref 13–17)
IMMATURE GRANULOCYTES: 6 %
INR BLD: 2
LYMPHOCYTES # BLD: 7 % (ref 24–43)
MCH RBC QN AUTO: 28.5 PG (ref 25.2–33.5)
MCHC RBC AUTO-ENTMCNC: 33.4 G/DL (ref 28.4–34.8)
MCV RBC AUTO: 85.3 FL (ref 82.6–102.9)
MONOCYTES # BLD: 12 % (ref 3–12)
MORPHOLOGY: ABNORMAL
MORPHOLOGY: ABNORMAL
NRBC AUTOMATED: 1.2 PER 100 WBC
PDW BLD-RTO: 19 % (ref 11.8–14.4)
PLATELET # BLD: ABNORMAL K/UL (ref 138–453)
PLATELET, FLUORESCENCE: 97 K/UL (ref 138–453)
PLATELET, IMMATURE FRACTION: 7.2 % (ref 1.1–10.3)
POTASSIUM SERPL-SCNC: 4.8 MMOL/L (ref 3.7–5.3)
PROTHROMBIN TIME: 22.7 SEC (ref 11.5–14.2)
RBC # BLD: 4.98 M/UL (ref 4.21–5.77)
SEG NEUTROPHILS: 75 % (ref 36–65)
SEGMENTED NEUTROPHILS ABSOLUTE COUNT: 4.2 K/UL (ref 1.5–8.1)
SODIUM BLD-SCNC: 133 MMOL/L (ref 135–144)
TOTAL PROTEIN: 7.3 G/DL (ref 6.4–8.3)
WBC # BLD: 5.6 K/UL (ref 3.5–11.3)

## 2022-12-19 PROCEDURE — 82728 ASSAY OF FERRITIN: CPT

## 2022-12-19 PROCEDURE — 85025 COMPLETE CBC W/AUTO DIFF WBC: CPT

## 2022-12-19 PROCEDURE — 85610 PROTHROMBIN TIME: CPT

## 2022-12-19 PROCEDURE — 36415 COLL VENOUS BLD VENIPUNCTURE: CPT

## 2022-12-19 PROCEDURE — 80053 COMPREHEN METABOLIC PANEL: CPT

## 2022-12-19 PROCEDURE — 85055 RETICULATED PLATELET ASSAY: CPT

## 2022-12-21 ENCOUNTER — OFFICE VISIT (OUTPATIENT)
Dept: ONCOLOGY | Age: 72
End: 2022-12-21
Payer: COMMERCIAL

## 2022-12-21 VITALS
HEIGHT: 71 IN | WEIGHT: 181 LBS | DIASTOLIC BLOOD PRESSURE: 58 MMHG | BODY MASS INDEX: 25.34 KG/M2 | TEMPERATURE: 97.1 F | HEART RATE: 79 BPM | SYSTOLIC BLOOD PRESSURE: 132 MMHG | RESPIRATION RATE: 18 BRPM

## 2022-12-21 DIAGNOSIS — D47.1 MYELOPROLIFERATIVE DISORDER (HCC): Primary | ICD-10-CM

## 2022-12-21 DIAGNOSIS — D45 POLYCYTHEMIA RUBRA VERA (HCC): ICD-10-CM

## 2022-12-21 DIAGNOSIS — D68.59 HYPERCOAGULABLE STATE (HCC): Primary | ICD-10-CM

## 2022-12-21 DIAGNOSIS — D68.59 HYPERCOAGULABLE STATE (HCC): ICD-10-CM

## 2022-12-21 DIAGNOSIS — D75.81 MYELOFIBROSIS (HCC): ICD-10-CM

## 2022-12-21 DIAGNOSIS — D47.1 MYELOPROLIFERATIVE DISORDER (HCC): ICD-10-CM

## 2022-12-21 PROCEDURE — 3074F SYST BP LT 130 MM HG: CPT | Performed by: INTERNAL MEDICINE

## 2022-12-21 PROCEDURE — 99214 OFFICE O/P EST MOD 30 MIN: CPT | Performed by: INTERNAL MEDICINE

## 2022-12-21 PROCEDURE — G8427 DOCREV CUR MEDS BY ELIG CLIN: HCPCS | Performed by: INTERNAL MEDICINE

## 2022-12-21 PROCEDURE — G8417 CALC BMI ABV UP PARAM F/U: HCPCS | Performed by: INTERNAL MEDICINE

## 2022-12-21 PROCEDURE — 3078F DIAST BP <80 MM HG: CPT | Performed by: INTERNAL MEDICINE

## 2022-12-21 PROCEDURE — 1036F TOBACCO NON-USER: CPT | Performed by: INTERNAL MEDICINE

## 2022-12-21 PROCEDURE — G8484 FLU IMMUNIZE NO ADMIN: HCPCS | Performed by: INTERNAL MEDICINE

## 2022-12-21 PROCEDURE — 1123F ACP DISCUSS/DSCN MKR DOCD: CPT | Performed by: INTERNAL MEDICINE

## 2022-12-21 PROCEDURE — 3017F COLORECTAL CA SCREEN DOC REV: CPT | Performed by: INTERNAL MEDICINE

## 2022-12-21 NOTE — PROGRESS NOTES
Chief Complaint   Patient presents with    Follow-up     Myeloproliferative disorder       DIAGNOSIS:   History of myeloproliferative disorder , Malcom 2+, likely polycythemia diagnosed in 2016  Factor V Leiden mutation and lupus anticoagulant  Spent phase polycythemia/myelofibrosis transformation diagnosed in 2019  CURRENT THERAPY:  Started hydroxyurea and aspirin  Upon diagnosis of myelofibrosis, transition to [place]  Long-term anticoagulation with Coumadin  BRIEF CASE HISTORY:   Olena Rodríguez is a very pleasant 71 y.o. male who was followed by different hematologist, he was diagnosed with myeloproliferative syndrome likely polycythemia vera where he presented with polycythemia and thrombocytosis and he was Malcom 2+. He was started on hydroxyurea for multiple years. In 2019, he started having worsening cytopenias and bone marrow aspiration biopsy showed hypercellular marrow but increased megakaryocyte and increased fibrotic changes suggestive of spent phase polycythemia/myelofibrosis. He was started on Jakafi. The patient also has a history of hypercoagulable condition namely factor V Leiden and lupus anticoagulant and he is on long-term anticoagulation. INTERIM HISTORY:  The patient comes in today for a follow up, feeling well and doing well . He is on [facility] [place]. Tolerated well. No side effects. INR has been therapeutic. Other than bruising, he has no complaints. He is fairly active without limitation    PAST MEDICAL HISTORY: has a past medical history of Blood disorder, Diabetes mellitus ([address] Hypercoagulable state ([facility] [address] and Hypertension. PAST SURGICAL HISTORY: has a past surgical history that includes Rotator cuff repair (Right, 2014); Cystoscopy;  Toe Surgery (Right); and cyst removal.     CURRENT MEDICATIONS:  has a current medication list which includes the following prescription(s): ferosul, jakafi, warfarin, warfarin, aspirin, steglatro, tamsulosin, multiple vitamin, omega-3 fatty acids-vitamin e, lisinopril-hydrochlorothiazide, kombiglyze xr, primidone, and ra omeprazole. ALLERGIES:  has No Known Allergies. FAMILY HISTORY: Negative for any hematological or oncological conditions. SOCIAL HISTORY:  reports that he quit smoking about 41 years ago. His smoking use included cigarettes. He has a 7.50 pack-year smoking history. He has never used smokeless tobacco. He reports that he does not currently use alcohol. He reports that he does not use drugs. REVIEW OF SYSTEMS:  General: no fever or night sweats, Weight is stable. ENT: No double or blurred vision, no tinnitus or hearing problem, no dysphagia or sore throat   Respiratory: No chest pain, no shortness of breath, no cough or hemoptysis. Cardiovascular: Denies chest pain, PND or orthopnea. No L E swelling or palpitations. Gastrointestinal:    No nausea or vomiting, abdominal pain, diarrhea or constipation. Genitourinary: Denies dysuria, hematuria, frequency, urgency or incontinence. Neurological: Denies headaches, decreased LOC, no sensory or motor focal deficits. Musculoskeletal:  No arthralgia no back pain or joint swelling. Skin: There are no rashes or bleeding. Easy bruisability secondary to anticoagulation  Psychiatric:  No anxiety, no depression. Endocrine: no diabetes or thyroid disease. Hematologic: no bleeding , no adenopathy. PHYSICAL EXAM: Shows a well appearing 71y.o.-year-old male who is not in pain or distress. Vital Signs: Blood pressure (!) 132/58, pulse 79, temperature 97.1 °F (36.2 °C), temperature source Temporal, resp. rate 18, height 5' 11\" (1.803 m), weight 181 lb (82.1 kg). HEENT: Normocephalic and atraumatic. Pupils are equal, round, reactive to light and accommodation. Extraocular muscles are intact. Neck: Showed no JVD, no carotid bruit . Lungs: Clear to auscultation bilaterally. Heart: Regular , systolic murmur unchanged abdomen: Soft, nontender.   Enlarged spleen,  It seems smaller compared to my previous exam extremities: Lower extremities show no edema, clubbing, or cyanosis. Breasts: Examination not done today. Neuro exam: intact cranial nerves bilaterally no motor or sensory deficit, gait is normal. Lymphatic: no adenopathy appreciated in the supraclavicular, axillary, cervical or inguinal area    Review of radiological studies:     Review of laboratory data:   Lab Results   Component Value Date    WBC 5.6 12/19/2022    HGB 14.2 12/19/2022    HCT 42.5 12/19/2022    MCV 85.3 12/19/2022    PLT See Reflexed IPF Result 12/19/2022       Chemistry        Component Value Date/Time     (L) 12/19/2022 1040    K 4.8 12/19/2022 1040    CL 98 12/19/2022 1040    CO2 26 12/19/2022 1040    BUN 25 (H) 12/19/2022 1040    CREATININE 1.12 12/19/2022 1040        Component Value Date/Time    CALCIUM 9.7 12/19/2022 1040    ALKPHOS 112 12/19/2022 1040    AST 26 12/19/2022 1040    ALT 24 12/19/2022 1040    BILITOT 0.5 12/19/2022 1040          IMPRESSION:   History of polycythemia  Spent phase  polycythemia/myelofibrosis  Hypercoagulable condition factor V Leiden and lupus anticoagulant  Plan:   The patient is doing very well. Tolerating Jakafi very well. Platelets dropped a little bit but he has not before. No symptoms. We will plan to continue current therapy without changes. Continue with Coumadin, therapeutic INR between 2 and 3  Return in 3 months                          6 [facility]                            This note is created with the assistance of a speech recognition program.  While intending to generate a document that actually reflects the content of the visit, the document can still have some errors including those of syntax and sound a like substitutions which may escape proof reading. It such instances, actual meaning can be extrapolated by contextual diversion.

## 2023-02-08 ENCOUNTER — HOSPITAL ENCOUNTER (OUTPATIENT)
Age: 73
Setting detail: SPECIMEN
Discharge: HOME | End: 2023-02-08
Payer: COMMERCIAL

## 2023-02-08 ENCOUNTER — OFFICE VISIT (OUTPATIENT)
Dept: UROLOGY | Age: 73
End: 2023-02-08
Payer: COMMERCIAL

## 2023-02-08 VITALS
SYSTOLIC BLOOD PRESSURE: 138 MMHG | HEIGHT: 71 IN | WEIGHT: 182 LBS | TEMPERATURE: 97.7 F | BODY MASS INDEX: 25.48 KG/M2 | DIASTOLIC BLOOD PRESSURE: 74 MMHG | HEART RATE: 77 BPM

## 2023-02-08 DIAGNOSIS — N13.8 BPH WITH OBSTRUCTION/LOWER URINARY TRACT SYMPTOMS: Primary | ICD-10-CM

## 2023-02-08 DIAGNOSIS — R31.0 GROSS HEMATURIA: ICD-10-CM

## 2023-02-08 DIAGNOSIS — N40.1 BPH WITH OBSTRUCTION/LOWER URINARY TRACT SYMPTOMS: Primary | ICD-10-CM

## 2023-02-08 DIAGNOSIS — R35.1 NOCTURIA: ICD-10-CM

## 2023-02-08 LAB
BACTERIA: ABNORMAL
BILIRUBIN URINE: NEGATIVE
COLOR: YELLOW
EPITHELIAL CELLS UA: ABNORMAL /HPF (ref 0–5)
GLUCOSE UR STRIP.AUTO-MCNC: ABNORMAL MG/DL
KETONES UR STRIP.AUTO-MCNC: NEGATIVE MG/DL
LEUKOCYTE ESTERASE UR QL STRIP.AUTO: NEGATIVE
MUCUS: ABNORMAL
NITRITE UR QL STRIP.AUTO: NEGATIVE
PROT UR STRIP.AUTO-MCNC: 6 MG/DL (ref 5–9)
PROT UR STRIP.AUTO-MCNC: NEGATIVE MG/DL
RBC CLUMPS #/AREA URNS AUTO: ABNORMAL /HPF (ref 0–2)
SPECIFIC GRAVITY UA: 1.02 (ref 1.01–1.02)
TURBIDITY: CLEAR
URINE HGB: NEGATIVE
UROBILINOGEN, URINE: NORMAL
WBC UA: ABNORMAL /HPF (ref 0–5)

## 2023-02-08 PROCEDURE — G8427 DOCREV CUR MEDS BY ELIG CLIN: HCPCS | Performed by: PHYSICIAN ASSISTANT

## 2023-02-08 PROCEDURE — 3078F DIAST BP <80 MM HG: CPT | Performed by: PHYSICIAN ASSISTANT

## 2023-02-08 PROCEDURE — 51798 US URINE CAPACITY MEASURE: CPT | Performed by: PHYSICIAN ASSISTANT

## 2023-02-08 PROCEDURE — 3017F COLORECTAL CA SCREEN DOC REV: CPT | Performed by: PHYSICIAN ASSISTANT

## 2023-02-08 PROCEDURE — 81001 URINALYSIS AUTO W/SCOPE: CPT

## 2023-02-08 PROCEDURE — G8417 CALC BMI ABV UP PARAM F/U: HCPCS | Performed by: PHYSICIAN ASSISTANT

## 2023-02-08 PROCEDURE — 1036F TOBACCO NON-USER: CPT | Performed by: PHYSICIAN ASSISTANT

## 2023-02-08 PROCEDURE — 99213 OFFICE O/P EST LOW 20 MIN: CPT | Performed by: PHYSICIAN ASSISTANT

## 2023-02-08 PROCEDURE — 3075F SYST BP GE 130 - 139MM HG: CPT | Performed by: PHYSICIAN ASSISTANT

## 2023-02-08 PROCEDURE — G8484 FLU IMMUNIZE NO ADMIN: HCPCS | Performed by: PHYSICIAN ASSISTANT

## 2023-02-08 PROCEDURE — 1123F ACP DISCUSS/DSCN MKR DOCD: CPT | Performed by: PHYSICIAN ASSISTANT

## 2023-02-08 ASSESSMENT — ENCOUNTER SYMPTOMS
COLOR CHANGE: 0
WHEEZING: 0
NAUSEA: 0
SHORTNESS OF BREATH: 0
CONSTIPATION: 0
BACK PAIN: 0
ABDOMINAL PAIN: 0
COUGH: 0
EYE REDNESS: 0
VOMITING: 0

## 2023-02-08 NOTE — PATIENT INSTRUCTIONS
SURVEY:    You may be receiving a survey from [employer] regarding your visit today. Please complete the survey to enable us to provide the highest quality of care to you and your family. If you cannot score us a very good on any question, please call the office to discuss how we could have made your experience a very good one. Thank you.

## 2023-02-08 NOTE — PROGRESS NOTES
HPI:      Patient is a 68 y.o. male in no acute distress. He is alert and oriented to person, place, and time. History  Previous patient of Dr. Yuli Gonzales     Previous patient of Dr. Lind Done for gross hematuria, elevated PSA, urethral stricture, and BPH.      9/2020 Referred here by Dr. Ora Winslow for erectile dysfunction. Has tried 50 mg Viagra in the past.  This was not helpful. This was sometime ago and he is unsure if he took the medication appropriately. Offered Trimix patient declined    1/31/22 -gross hematuria-CT urogram was negative for significant  abnormalities other than an enlarged prostate. Cystoscopy: No bladder lesions, extensive trilobar prostate   Clot retention after cystoscopy requiring hospitalization     PSA  11/2022 - 3.58  11/2021 - 2.75  9/2020 - 2.61    Today:  Patient is here today for follow-up BPH, nocturia, urinary retention, history of gross hematuria. He continues to take Flomax. He denies any gross hematuria. He feels that he is doing well. Nocturia x1.   Bladderscan performed in office today: Pt voided - 190 mL, PVR - 92 mL       Past Medical History:   Diagnosis Date    Blood disorder     Diabetes mellitus ([facility] [address]     Hypercoagulable state ([facility] [address]     Factor V Leiden and Lupus anticoagulant - on long term anticoagulation    Hypertension      Past Surgical History:   Procedure Laterality Date    CYST REMOVAL      cyst removal on back    CYSTOSCOPY      Patient states 3 or 4 years ago    [address] Right 2014    TOE SURGERY Right     bone spur right great toe     Outpatient Encounter Medications as of 2/8/2023   Medication Sig Dispense Refill    FEROSUL 325 (65 Fe) MG tablet take 1 tablet by mouth once daily with breakfast 30 tablet 5    ruxolitinib phosphate (JAKAFI) 10 MG tablet Take 1 tablet by mouth 2 times daily 60 tablet 5    warfarin (COUMADIN) 5 MG tablet take 1 tablet by mouth once daily      warfarin (COUMADIN) 2 MG tablet take 2 tablets by mouth once daily      STEGLATRO 5 MG TABS Take 5 mg by mouth daily       tamsulosin (FLOMAX) 0.4 MG capsule take 1 capsule by mouth once daily 1/2 HOUR FOLLOWING THE SAME MEAL EACH DAY      Multiple Vitamins-Minerals (MULTIVITAMIN ADULT PO) Take 1 tablet by mouth      OMEGA-3 FATTY ACIDS-VITAMIN E PO Take 1 capsule by mouth daily       lisinopril-hydrochlorothiazide (PRINZIDE;ZESTORETIC) 20-25 MG per tablet 1 tablet daily   0    KOMBIGLYZE XR 5-1000 MG TB24 daily   0    primidone (MYSOLINE) 50 MG tablet 50 mg Take 3 tablets in the morning and 3 tablets in the evening  0    RA OMEPRAZOLE 20 MG EC tablet Take 40 mg by mouth daily  0    [DISCONTINUED] Aspirin 81 MG CAPS chew 1 tablet by oral route  every day (Patient not taking: Reported on 2/8/2023)       No facility-administered encounter medications on file as of 2/8/2023. Current Outpatient Medications on File Prior to Visit   Medication Sig Dispense Refill    FEROSUL 325 (65 Fe) MG tablet take 1 tablet by mouth once daily with breakfast 30 tablet 5    ruxolitinib phosphate (JAKAFI) 10 MG tablet Take 1 tablet by mouth 2 times daily 60 tablet 5    warfarin (COUMADIN) 5 MG tablet take 1 tablet by mouth once daily      warfarin (COUMADIN) 2 MG tablet take 2 tablets by mouth once daily      STEGLATRO 5 MG TABS Take 5 mg by mouth daily       tamsulosin (FLOMAX) 0.4 MG capsule take 1 capsule by mouth once daily 1/2 HOUR FOLLOWING THE SAME MEAL EACH DAY      Multiple Vitamins-Minerals (MULTIVITAMIN ADULT PO) Take 1 tablet by mouth      OMEGA-3 FATTY ACIDS-VITAMIN E PO Take 1 capsule by mouth daily       lisinopril-hydrochlorothiazide (PRINZIDE;ZESTORETIC) 20-25 MG per tablet 1 tablet daily   0    KOMBIGLYZE XR 5-1000 MG TB24 daily   0    primidone (MYSOLINE) 50 MG tablet 50 mg Take 3 tablets in the morning and 3 tablets in the evening  0    RA OMEPRAZOLE 20 MG EC tablet Take 40 mg by mouth daily  0     No current facility-administered medications on file prior to visit. Patient has no known allergies. No family history on file. Social History     Tobacco Use   Smoking Status Former    Packs/day: 0.50    Years: 15.00    Pack years: 7.50    Types: Cigarettes    Quit date: 1981    Years since quittin.1   Smokeless Tobacco Never   Tobacco Comments    quit smoking 38 years ago       Social History     Substance and Sexual Activity   Alcohol Use Not Currently    Comment: Quit drinking       Review of Systems   Constitutional:  Negative for appetite change, chills and fever. Eyes:  Negative for redness and visual disturbance. Respiratory:  Negative for cough, shortness of breath and wheezing. Cardiovascular:  Negative for chest pain and leg swelling. Gastrointestinal:  Negative for abdominal pain, constipation, nausea and vomiting. Genitourinary:  Negative for decreased urine volume, difficulty urinating, dysuria, enuresis, flank pain, frequency, hematuria, penile discharge, penile pain, scrotal swelling, testicular pain and urgency. Positive for nocturia x1 this is not bothersome. Musculoskeletal:  Negative for back pain, joint swelling and myalgias. Skin:  Negative for color change, rash and wound. Neurological:  Negative for dizziness, tremors and numbness. Hematological:  Negative for adenopathy. Does not bruise/bleed easily. /74 (Site: Right Upper Arm, Position: Sitting, Cuff Size: Medium Adult)   Pulse 77   Temp 97.7 °F (36.5 °C)   Ht 5' 11\" (1.803 m)   Wt 182 lb (82.6 kg)   BMI 25.38 kg/m²       PHYSICAL EXAM:  Constitutional: Patient in no acute distress; Neuro: alert and oriented to person place and time.     Psych: Mood and affect normal.  Lungs: Respiratory effort normal  Cardiovascular:  Normal peripheral pulses    Rectal: deferred     Lab Results   Component Value Date    BUN 25 (H) 2022     Lab Results   Component Value Date    CREATININE 1.12 2022     Lab Results   Component Value Date    PSA 3.58 11/07/2022    PSA 2.75 11/03/2021    PSA 2.61 09/18/2020       ASSESSMENT:   Diagnosis Orders   1. BPH with obstruction/lower urinary tract symptoms  TX JAVIER POST-VOIDING RESIDUAL URINE&/BLADDER CAP      2. Gross hematuria        3. Nocturia            PLAN:  We will check a UA for his history of hematuria, we will call him with results    Continue [facility]    Follow-up in November for PSA check, we will check for hematuria again at that time so we can sync up his appointments to yearly.

## 2023-02-09 ENCOUNTER — TELEPHONE (OUTPATIENT)
Dept: UROLOGY | Age: 73
End: 2023-02-09

## 2023-02-16 ENCOUNTER — HOSPITAL ENCOUNTER (OUTPATIENT)
Age: 73
Discharge: HOME | End: 2023-02-16
Payer: COMMERCIAL

## 2023-02-16 LAB
ABSOLUTE EOS #: 0.07 K/UL (ref 0–0.44)
ABSOLUTE IMMATURE GRANULOCYTE: 0.52 K/UL (ref 0–0.3)
ABSOLUTE LYMPH #: 1.43 K/UL (ref 1.1–3.7)
ABSOLUTE MONO #: 1.24 K/UL (ref 0.1–1.2)
ALBUMIN SERPL-MCNC: 4.9 G/DL (ref 3.5–5.2)
ALBUMIN/GLOBULIN RATIO: 1.8 (ref 1–2.5)
ALP SERPL-CCNC: 117 U/L (ref 40–129)
ALT SERPL-CCNC: 23 U/L (ref 5–41)
ANION GAP SERPL CALCULATED.3IONS-SCNC: 11 MMOL/L (ref 9–17)
AST SERPL-CCNC: 26 U/L
BASOPHILS # BLD: 1 % (ref 0–2)
BASOPHILS ABSOLUTE: 0.07 K/UL (ref 0–0.2)
BILIRUB DIRECT SERPL-MCNC: <0.1 MG/DL
BILIRUB INDIRECT SERPL-MCNC: ABNORMAL MG/DL (ref 0–1)
BILIRUB SERPL-MCNC: 0.6 MG/DL (ref 0.3–1.2)
BUN SERPL-MCNC: 20 MG/DL (ref 8–23)
CALCIUM SERPL-MCNC: 10 MG/DL (ref 8.6–10.4)
CHLORIDE SERPL-SCNC: 99 MMOL/L (ref 98–107)
CHOLEST SERPL-MCNC: 150 MG/DL
CHOLESTEROL/HDL RATIO: 6.5
CO2 SERPL-SCNC: 26 MMOL/L (ref 20–31)
CREAT SERPL-MCNC: 1.09 MG/DL (ref 0.7–1.2)
CREATININE URINE: 54.1 MG/DL (ref 39–259)
EOSINOPHILS RELATIVE PERCENT: 1 % (ref 1–4)
EST. AVERAGE GLUCOSE BLD GHB EST-MCNC: 114 MG/DL
GFR SERPL CREATININE-BSD FRML MDRD: >60 ML/MIN/1.73M2
GLUCOSE SERPL-MCNC: 139 MG/DL (ref 70–99)
HBA1C MFR BLD: 5.6 % (ref 4–6)
HCT VFR BLD AUTO: 46.6 % (ref 40.7–50.3)
HDLC SERPL-MCNC: 23 MG/DL
HGB BLD-MCNC: 15.1 G/DL (ref 13–17)
IMMATURE GRANULOCYTES: 8 %
LDLC SERPL CALC-MCNC: 69 MG/DL (ref 0–130)
LYMPHOCYTES # BLD: 22 % (ref 24–43)
MCH RBC QN AUTO: 28.6 PG (ref 25.2–33.5)
MCHC RBC AUTO-ENTMCNC: 32.4 G/DL (ref 28.4–34.8)
MCV RBC AUTO: 88.3 FL (ref 82.6–102.9)
MICROALBUMIN/CREAT 24H UR: 20 MG/L
MICROALBUMIN/CREAT UR-RTO: 37 MCG/MG CREAT
MONOCYTES # BLD: 19 % (ref 3–12)
MORPHOLOGY: ABNORMAL
MORPHOLOGY: ABNORMAL
NRBC AUTOMATED: 1.8 PER 100 WBC
NUCLEATED RED BLOOD CELLS: 3 PER 100 WBC (ref 0–5)
PDW BLD-RTO: 17 % (ref 11.8–14.4)
PLATELET # BLD AUTO: ABNORMAL K/UL (ref 138–453)
PLATELET, FLUORESCENCE: 81 K/UL (ref 138–453)
PLATELET, IMMATURE FRACTION: 8 % (ref 1.1–10.3)
POTASSIUM SERPL-SCNC: 4.5 MMOL/L (ref 3.7–5.3)
PROT SERPL-MCNC: 7.7 G/DL (ref 6.4–8.3)
RBC # BLD: 5.28 M/UL (ref 4.21–5.77)
SEG NEUTROPHILS: 49 % (ref 36–65)
SEGMENTED NEUTROPHILS ABSOLUTE COUNT: 3.17 K/UL (ref 1.5–8.1)
SODIUM SERPL-SCNC: 136 MMOL/L (ref 135–144)
TRIGL SERPL-MCNC: 290 MG/DL
TSH SERPL-ACNC: 2.87 UIU/ML (ref 0.3–5)
WBC # BLD AUTO: 6.5 K/UL (ref 3.5–11.3)

## 2023-02-16 PROCEDURE — 36415 COLL VENOUS BLD VENIPUNCTURE: CPT

## 2023-02-16 PROCEDURE — 85025 COMPLETE CBC W/AUTO DIFF WBC: CPT

## 2023-02-16 PROCEDURE — 85055 RETICULATED PLATELET ASSAY: CPT

## 2023-02-16 PROCEDURE — 84443 ASSAY THYROID STIM HORMONE: CPT

## 2023-02-16 PROCEDURE — 83036 HEMOGLOBIN GLYCOSYLATED A1C: CPT

## 2023-02-16 PROCEDURE — 80061 LIPID PANEL: CPT

## 2023-02-16 PROCEDURE — 80053 COMPREHEN METABOLIC PANEL: CPT

## 2023-02-16 PROCEDURE — 82248 BILIRUBIN DIRECT: CPT

## 2023-02-16 PROCEDURE — 82043 UR ALBUMIN QUANTITATIVE: CPT

## 2023-02-16 PROCEDURE — 82570 ASSAY OF URINE CREATININE: CPT

## 2023-03-02 ENCOUNTER — HOSPITAL ENCOUNTER (OUTPATIENT)
Age: 73
Discharge: HOME | End: 2023-03-02
Payer: COMMERCIAL

## 2023-03-02 DIAGNOSIS — D47.1 MYELOPROLIFERATIVE DISORDER (HCC): ICD-10-CM

## 2023-03-02 DIAGNOSIS — D45 POLYCYTHEMIA RUBRA VERA (HCC): ICD-10-CM

## 2023-03-02 DIAGNOSIS — D75.81 MYELOFIBROSIS (HCC): ICD-10-CM

## 2023-03-02 DIAGNOSIS — D68.59 HYPERCOAGULABLE STATE (HCC): ICD-10-CM

## 2023-03-02 LAB
ABSOLUTE EOS #: 0.13 K/UL (ref 0–0.44)
ABSOLUTE IMMATURE GRANULOCYTE: 0.51 K/UL (ref 0–0.3)
ABSOLUTE LYMPH #: 1.16 K/UL (ref 1.1–3.7)
ABSOLUTE MONO #: 0.97 K/UL (ref 0.1–1.2)
ALBUMIN SERPL-MCNC: 4.6 G/DL (ref 3.5–5.2)
ALBUMIN/GLOBULIN RATIO: 1.7 (ref 1–2.5)
ALP SERPL-CCNC: 122 U/L (ref 40–129)
ALT SERPL-CCNC: 25 U/L (ref 5–41)
ANION GAP SERPL CALCULATED.3IONS-SCNC: 11 MMOL/L (ref 9–17)
AST SERPL-CCNC: 30 U/L
BASOPHILS # BLD: 0 % (ref 0–2)
BASOPHILS ABSOLUTE: 0 K/UL (ref 0–0.2)
BILIRUB SERPL-MCNC: 0.6 MG/DL (ref 0.3–1.2)
BUN SERPL-MCNC: 24 MG/DL (ref 8–23)
BUN/CREAT BLD: 23 (ref 9–20)
CALCIUM SERPL-MCNC: 9.5 MG/DL (ref 8.6–10.4)
CHLORIDE SERPL-SCNC: 99 MMOL/L (ref 98–107)
CO2 SERPL-SCNC: 25 MMOL/L (ref 20–31)
CREAT SERPL-MCNC: 1.05 MG/DL (ref 0.7–1.2)
EOSINOPHILS RELATIVE PERCENT: 2 % (ref 1–4)
GFR SERPL CREATININE-BSD FRML MDRD: >60 ML/MIN/1.73M2
GLUCOSE SERPL-MCNC: 105 MG/DL (ref 70–99)
HCT VFR BLD AUTO: 44.8 % (ref 40.7–50.3)
HGB BLD-MCNC: 15.2 G/DL (ref 13–17)
IMMATURE GRANULOCYTES: 8 %
INR PPP: 2.4
LYMPHOCYTES # BLD: 18 % (ref 24–43)
MCH RBC QN AUTO: 29.1 PG (ref 25.2–33.5)
MCHC RBC AUTO-ENTMCNC: 33.9 G/DL (ref 28.4–34.8)
MCV RBC AUTO: 85.7 FL (ref 82.6–102.9)
MONOCYTES # BLD: 15 % (ref 3–12)
MORPHOLOGY: ABNORMAL
NRBC AUTOMATED: 1.8 PER 100 WBC
NUCLEATED RED BLOOD CELLS: 1 PER 100 WBC (ref 0–5)
PDW BLD-RTO: 16.6 % (ref 11.8–14.4)
PLATELET # BLD AUTO: ABNORMAL K/UL (ref 138–453)
PLATELET, FLUORESCENCE: 64 K/UL (ref 138–453)
PLATELET, IMMATURE FRACTION: 7.7 % (ref 1.1–10.3)
POTASSIUM SERPL-SCNC: 4.8 MMOL/L (ref 3.7–5.3)
PROT SERPL-MCNC: 7.3 G/DL (ref 6.4–8.3)
PROTHROMBIN TIME: 26.1 SEC (ref 11.5–14.2)
RBC # BLD: 5.23 M/UL (ref 4.21–5.77)
SEG NEUTROPHILS: 57 % (ref 36–65)
SEGMENTED NEUTROPHILS ABSOLUTE COUNT: 3.67 K/UL (ref 1.5–8.1)
SODIUM SERPL-SCNC: 135 MMOL/L (ref 135–144)
WBC # BLD AUTO: 6.4 K/UL (ref 3.5–11.3)

## 2023-03-02 PROCEDURE — 36415 COLL VENOUS BLD VENIPUNCTURE: CPT

## 2023-03-02 PROCEDURE — 82728 ASSAY OF FERRITIN: CPT

## 2023-03-02 PROCEDURE — 85610 PROTHROMBIN TIME: CPT

## 2023-03-02 PROCEDURE — 85025 COMPLETE CBC W/AUTO DIFF WBC: CPT

## 2023-03-02 PROCEDURE — 85055 RETICULATED PLATELET ASSAY: CPT

## 2023-03-02 PROCEDURE — 80053 COMPREHEN METABOLIC PANEL: CPT

## 2023-03-03 LAB — FERRITIN SERPL-MCNC: 126 NG/ML (ref 30–400)

## 2023-03-22 ENCOUNTER — OFFICE VISIT (OUTPATIENT)
Dept: ONCOLOGY | Age: 73
End: 2023-03-22
Payer: COMMERCIAL

## 2023-03-22 VITALS
SYSTOLIC BLOOD PRESSURE: 131 MMHG | BODY MASS INDEX: 25.06 KG/M2 | HEIGHT: 71 IN | RESPIRATION RATE: 18 BRPM | WEIGHT: 179 LBS | DIASTOLIC BLOOD PRESSURE: 62 MMHG | HEART RATE: 70 BPM | TEMPERATURE: 96.7 F

## 2023-03-22 DIAGNOSIS — D47.1 MYELOPROLIFERATIVE DISORDER (HCC): Primary | ICD-10-CM

## 2023-03-22 PROCEDURE — G8427 DOCREV CUR MEDS BY ELIG CLIN: HCPCS | Performed by: INTERNAL MEDICINE

## 2023-03-22 PROCEDURE — 3075F SYST BP GE 130 - 139MM HG: CPT | Performed by: INTERNAL MEDICINE

## 2023-03-22 PROCEDURE — 1036F TOBACCO NON-USER: CPT | Performed by: INTERNAL MEDICINE

## 2023-03-22 PROCEDURE — G8484 FLU IMMUNIZE NO ADMIN: HCPCS | Performed by: INTERNAL MEDICINE

## 2023-03-22 PROCEDURE — 3017F COLORECTAL CA SCREEN DOC REV: CPT | Performed by: INTERNAL MEDICINE

## 2023-03-22 PROCEDURE — 3078F DIAST BP <80 MM HG: CPT | Performed by: INTERNAL MEDICINE

## 2023-03-22 PROCEDURE — 99214 OFFICE O/P EST MOD 30 MIN: CPT | Performed by: INTERNAL MEDICINE

## 2023-03-22 PROCEDURE — G8420 CALC BMI NORM PARAMETERS: HCPCS | Performed by: INTERNAL MEDICINE

## 2023-03-22 PROCEDURE — 1123F ACP DISCUSS/DSCN MKR DOCD: CPT | Performed by: INTERNAL MEDICINE

## 2023-03-22 NOTE — PATIENT INSTRUCTIONS
Continue current therapy without changes. Monthly cbc  Rv in 3 months.  Need cbc, cmp ferritin prior to RV

## 2023-03-22 NOTE — PROGRESS NOTES
lisinopril-hydrochlorothiazide, kombiglyze xr, primidone, and ra omeprazole. ALLERGIES:  is allergic to no known allergies. FAMILY HISTORY: Negative for any hematological or oncological conditions. SOCIAL HISTORY:  reports that he quit smoking about 42 years ago. His smoking use included cigarettes. He has a 7.50 pack-year smoking history. He has never used smokeless tobacco. He reports that he does not currently use alcohol. He reports that he does not use drugs. REVIEW OF SYSTEMS:  General: no fever or night sweats, Weight is stable. ENT: No double or blurred vision, no tinnitus or hearing problem, no dysphagia or sore throat   Respiratory: No chest pain, no shortness of breath, no cough or hemoptysis. Cardiovascular: Denies chest pain, PND or orthopnea. No L E swelling or palpitations. Gastrointestinal:    No nausea or vomiting, abdominal pain, diarrhea or constipation. Genitourinary: Denies dysuria, hematuria, frequency, urgency or incontinence. Neurological: Denies headaches, decreased LOC, no sensory or motor focal deficits. Musculoskeletal:  No arthralgia no back pain or joint swelling. Skin: There are no rashes or bleeding. Easy bruisability secondary to anticoagulation  Psychiatric:  No anxiety, no depression. Endocrine: no diabetes or thyroid disease. Hematologic: no bleeding , no adenopathy. PHYSICAL EXAM: Shows a well appearing 68y.o.-year-old male who is not in pain or distress. Vital Signs: Blood pressure 131/62, pulse 70, temperature (!) 96.7 °F (35.9 °C), temperature source Temporal, resp. rate 18, height 5' 11\" (1.803 m), weight 179 lb (81.2 kg). HEENT: Normocephalic and atraumatic. Pupils are equal, round, reactive to light and accommodation. Extraocular muscles are intact. Neck: Showed no JVD, no carotid bruit . Lungs: Clear to auscultation bilaterally. Heart: Regular , systolic murmur unchanged abdomen: Soft, nontender.   Enlarged spleen,  It seems smaller

## 2023-04-28 RX ORDER — RUXOLITINIB 10 MG/1
TABLET ORAL
Qty: 60 TABLET | Refills: 5 | Status: SHIPPED | OUTPATIENT
Start: 2023-04-28

## 2023-05-01 RX ORDER — FERROUS SULFATE 325(65) MG
TABLET ORAL
Qty: 30 TABLET | Refills: 5 | Status: SHIPPED | OUTPATIENT
Start: 2023-05-01

## 2023-06-06 ENCOUNTER — HOSPITAL ENCOUNTER (OUTPATIENT)
Age: 73
Discharge: HOME | End: 2023-06-06
Payer: COMMERCIAL

## 2023-06-06 DIAGNOSIS — D75.81 MYELOFIBROSIS (HCC): ICD-10-CM

## 2023-06-06 DIAGNOSIS — D47.1 MYELOPROLIFERATIVE DISORDER (HCC): ICD-10-CM

## 2023-06-06 DIAGNOSIS — D68.59 HYPERCOAGULABLE STATE (HCC): ICD-10-CM

## 2023-06-06 LAB
ALBUMIN SERPL-MCNC: 4.5 G/DL (ref 3.5–5.2)
ALBUMIN/GLOB SERPL: 1.7 {RATIO} (ref 1–2.5)
ALP SERPL-CCNC: 109 U/L (ref 40–129)
ALT SERPL-CCNC: 23 U/L (ref 5–41)
ANION GAP SERPL CALCULATED.3IONS-SCNC: 10 MMOL/L (ref 9–17)
AST SERPL-CCNC: 29 U/L
BASOPHILS # BLD: 0.05 K/UL (ref 0–0.2)
BASOPHILS NFR BLD: 1 % (ref 0–2)
BILIRUB SERPL-MCNC: 0.5 MG/DL (ref 0.3–1.2)
BUN SERPL-MCNC: 23 MG/DL (ref 8–23)
BUN/CREAT SERPL: 25 (ref 9–20)
CALCIUM SERPL-MCNC: 9.4 MG/DL (ref 8.6–10.4)
CHLORIDE SERPL-SCNC: 100 MMOL/L (ref 98–107)
CO2 SERPL-SCNC: 26 MMOL/L (ref 20–31)
CREAT SERPL-MCNC: 0.93 MG/DL (ref 0.7–1.2)
EOSINOPHIL # BLD: 0.16 K/UL (ref 0–0.44)
EOSINOPHILS RELATIVE PERCENT: 3 % (ref 1–4)
ERYTHROCYTE [DISTWIDTH] IN BLOOD BY AUTOMATED COUNT: 17.9 % (ref 11.8–14.4)
FERRITIN SERPL-MCNC: 294 NG/ML (ref 30–400)
GFR SERPL CREATININE-BSD FRML MDRD: >60 ML/MIN/1.73M2
GLUCOSE SERPL-MCNC: 115 MG/DL (ref 70–99)
HCT VFR BLD AUTO: 40.9 % (ref 40.7–50.3)
HGB BLD-MCNC: 13.9 G/DL (ref 13–17)
IMM GRANULOCYTES # BLD AUTO: 0.27 K/UL (ref 0–0.3)
IMM GRANULOCYTES NFR BLD: 5 %
INR PPP: 2.4
LYMPHOCYTES # BLD: 29 % (ref 24–43)
LYMPHOCYTES NFR BLD: 1.57 K/UL (ref 1.1–3.7)
MCH RBC QN AUTO: 28.6 PG (ref 25.2–33.5)
MCHC RBC AUTO-ENTMCNC: 34 G/DL (ref 28.4–34.8)
MCV RBC AUTO: 84.2 FL (ref 82.6–102.9)
MONOCYTES NFR BLD: 0.81 K/UL (ref 0.1–1.2)
MONOCYTES NFR BLD: 15 % (ref 3–12)
MORPHOLOGY: ABNORMAL
MORPHOLOGY: ABNORMAL
NEUTROPHILS NFR BLD: 47 % (ref 36–65)
NEUTS SEG NFR BLD: 2.54 K/UL (ref 1.5–8.1)
NRBC AUTOMATED: 1.1 PER 100 WBC
PLATELET # BLD AUTO: ABNORMAL K/UL (ref 138–453)
PLATELET, FLUORESCENCE: 81 K/UL (ref 138–453)
PLATELETS.RETICULATED NFR BLD AUTO: 6.1 % (ref 1.1–10.3)
POTASSIUM SERPL-SCNC: 5 MMOL/L (ref 3.7–5.3)
PROT SERPL-MCNC: 7.2 G/DL (ref 6.4–8.3)
PROTHROMBIN TIME: 26.2 SEC (ref 11.9–14.8)
RBC # BLD AUTO: 4.86 M/UL (ref 4.21–5.77)
SODIUM SERPL-SCNC: 136 MMOL/L (ref 135–144)
WBC OTHER # BLD: 5.4 K/UL (ref 3.5–11.3)

## 2023-06-06 PROCEDURE — 85610 PROTHROMBIN TIME: CPT

## 2023-06-06 PROCEDURE — 82728 ASSAY OF FERRITIN: CPT

## 2023-06-06 PROCEDURE — 85055 RETICULATED PLATELET ASSAY: CPT

## 2023-06-06 PROCEDURE — 80053 COMPREHEN METABOLIC PANEL: CPT

## 2023-06-06 PROCEDURE — 85027 COMPLETE CBC AUTOMATED: CPT

## 2023-06-06 PROCEDURE — 36415 COLL VENOUS BLD VENIPUNCTURE: CPT

## 2023-06-21 ENCOUNTER — OFFICE VISIT (OUTPATIENT)
Dept: ONCOLOGY | Age: 73
End: 2023-06-21
Payer: COMMERCIAL

## 2023-06-21 VITALS
WEIGHT: 182 LBS | HEART RATE: 75 BPM | SYSTOLIC BLOOD PRESSURE: 138 MMHG | RESPIRATION RATE: 18 BRPM | TEMPERATURE: 97.3 F | DIASTOLIC BLOOD PRESSURE: 66 MMHG | BODY MASS INDEX: 25.38 KG/M2

## 2023-06-21 DIAGNOSIS — D47.1 MYELOPROLIFERATIVE DISORDER (HCC): Primary | ICD-10-CM

## 2023-06-21 PROCEDURE — G8428 CUR MEDS NOT DOCUMENT: HCPCS | Performed by: INTERNAL MEDICINE

## 2023-06-21 PROCEDURE — 3078F DIAST BP <80 MM HG: CPT | Performed by: INTERNAL MEDICINE

## 2023-06-21 PROCEDURE — 1123F ACP DISCUSS/DSCN MKR DOCD: CPT | Performed by: INTERNAL MEDICINE

## 2023-06-21 PROCEDURE — G8417 CALC BMI ABV UP PARAM F/U: HCPCS | Performed by: INTERNAL MEDICINE

## 2023-06-21 PROCEDURE — 3017F COLORECTAL CA SCREEN DOC REV: CPT | Performed by: INTERNAL MEDICINE

## 2023-06-21 PROCEDURE — 1036F TOBACCO NON-USER: CPT | Performed by: INTERNAL MEDICINE

## 2023-06-21 PROCEDURE — 3074F SYST BP LT 130 MM HG: CPT | Performed by: INTERNAL MEDICINE

## 2023-06-21 PROCEDURE — 99214 OFFICE O/P EST MOD 30 MIN: CPT | Performed by: INTERNAL MEDICINE

## 2023-06-21 NOTE — PROGRESS NOTES
Chief Complaint   Patient presents with    Follow-up     Myeloproliferative disorder       DIAGNOSIS:   History of myeloproliferative disorder , Malcom 2+, likely polycythemia diagnosed in 2016  Factor V Leiden mutation and lupus anticoagulant  Spent phase polycythemia/myelofibrosis transformation diagnosed in 2019  CURRENT THERAPY:  Started hydroxyurea and aspirin  Upon diagnosis of myelofibrosis, transition to [place]  Long-term anticoagulation with Coumadin  BRIEF CASE HISTORY:   Delores Villalba is a very pleasant 68 y.o. male who was followed by different hematologist, he was diagnosed with myeloproliferative syndrome likely polycythemia vera where he presented with polycythemia and thrombocytosis and he was Malcom 2+. He was started on hydroxyurea for multiple years. In 2019, he started having worsening cytopenias and bone marrow aspiration biopsy showed hypercellular marrow but increased megakaryocyte and increased fibrotic changes suggestive of spent phase polycythemia/myelofibrosis. He was started on Jakafi. The patient also has a history of hypercoagulable condition namely factor V Leiden and lupus anticoagulant and he is on long-term anticoagulation. INTERIM HISTORY:  The patient comes in today for a follow up, feeling well and doing well . He is on [facility] [place]. Tolerated well. No side effects. INR has been therapeutic. Other than bruising, he has no complaints. He is fairly active without limitation  Plan for heart surgery in July    PAST MEDICAL HISTORY: has a past medical history of Blood disorder, Diabetes mellitus ([address] Hypercoagulable state ([facility] [address] and Hypertension. PAST SURGICAL HISTORY: has a past surgical history that includes Rotator cuff repair (Right, 2014); Cystoscopy;  Toe Surgery (Right); and cyst removal.     CURRENT MEDICATIONS:  has a current medication list which includes the following prescription(s): ferosul, jakafi, warfarin, warfarin, steglatro, tamsulosin, multiple vitamin,

## 2023-08-08 ENCOUNTER — HOSPITAL ENCOUNTER (OUTPATIENT)
Age: 73
Discharge: HOME | End: 2023-08-08
Payer: COMMERCIAL

## 2023-08-08 DIAGNOSIS — D47.1 MYELOPROLIFERATIVE DISORDER (HCC): ICD-10-CM

## 2023-08-08 LAB
BASOPHILS # BLD: 0.1 K/UL (ref 0–0.2)
BASOPHILS NFR BLD: 1 % (ref 0–2)
EOSINOPHIL # BLD: 0.19 K/UL (ref 0–0.44)
EOSINOPHILS RELATIVE PERCENT: 2 % (ref 1–4)
ERYTHROCYTE [DISTWIDTH] IN BLOOD BY AUTOMATED COUNT: 17.8 % (ref 11.8–14.4)
HCT VFR BLD AUTO: 45 % (ref 40.7–50.3)
HGB BLD-MCNC: 14.6 G/DL (ref 13–17)
IMM GRANULOCYTES # BLD AUTO: 0.58 K/UL (ref 0–0.3)
IMM GRANULOCYTES NFR BLD: 6 %
INR PPP: 2.6
LYMPHOCYTES NFR BLD: 1.16 K/UL (ref 1.1–3.7)
LYMPHOCYTES RELATIVE PERCENT: 12 % (ref 24–43)
MCH RBC QN AUTO: 26.7 PG (ref 25.2–33.5)
MCHC RBC AUTO-ENTMCNC: 32.4 G/DL (ref 28.4–34.8)
MCV RBC AUTO: 82.3 FL (ref 82.6–102.9)
MONOCYTES NFR BLD: 0.78 K/UL (ref 0.1–1.2)
MONOCYTES NFR BLD: 8 % (ref 3–12)
MORPHOLOGY: NORMAL
NEUTROPHILS NFR BLD: 71 % (ref 36–65)
NEUTS SEG NFR BLD: 6.89 K/UL (ref 1.5–8.1)
NRBC BLD-RTO: 0.6 PER 100 WBC
PLATELET # BLD AUTO: ABNORMAL K/UL (ref 138–453)
PLATELET, FLUORESCENCE: 99 K/UL (ref 138–453)
PLATELETS.RETICULATED NFR BLD AUTO: 8.1 % (ref 1.1–10.3)
PROTHROMBIN TIME: 28 SEC (ref 11.9–14.8)
RBC # BLD AUTO: 5.47 M/UL (ref 4.21–5.77)
WBC OTHER # BLD: 9.7 K/UL (ref 3.5–11.3)

## 2023-08-08 PROCEDURE — 36415 COLL VENOUS BLD VENIPUNCTURE: CPT

## 2023-08-08 PROCEDURE — 85610 PROTHROMBIN TIME: CPT

## 2023-08-08 PROCEDURE — 85025 COMPLETE CBC W/AUTO DIFF WBC: CPT

## 2023-08-09 ENCOUNTER — OFFICE VISIT (OUTPATIENT)
Dept: ONCOLOGY | Age: 73
End: 2023-08-09
Payer: COMMERCIAL

## 2023-08-09 VITALS
TEMPERATURE: 96.7 F | SYSTOLIC BLOOD PRESSURE: 130 MMHG | BODY MASS INDEX: 23.43 KG/M2 | HEART RATE: 83 BPM | WEIGHT: 168 LBS | RESPIRATION RATE: 18 BRPM | DIASTOLIC BLOOD PRESSURE: 57 MMHG

## 2023-08-09 DIAGNOSIS — D47.1 MYELOPROLIFERATIVE DISORDER (HCC): Primary | ICD-10-CM

## 2023-08-09 DIAGNOSIS — D68.59 HYPERCOAGULABLE STATE (HCC): ICD-10-CM

## 2023-08-09 PROCEDURE — 3075F SYST BP GE 130 - 139MM HG: CPT | Performed by: INTERNAL MEDICINE

## 2023-08-09 PROCEDURE — 3017F COLORECTAL CA SCREEN DOC REV: CPT | Performed by: INTERNAL MEDICINE

## 2023-08-09 PROCEDURE — 1123F ACP DISCUSS/DSCN MKR DOCD: CPT | Performed by: INTERNAL MEDICINE

## 2023-08-09 PROCEDURE — G8427 DOCREV CUR MEDS BY ELIG CLIN: HCPCS | Performed by: INTERNAL MEDICINE

## 2023-08-09 PROCEDURE — G8420 CALC BMI NORM PARAMETERS: HCPCS | Performed by: INTERNAL MEDICINE

## 2023-08-09 PROCEDURE — 3078F DIAST BP <80 MM HG: CPT | Performed by: INTERNAL MEDICINE

## 2023-08-09 PROCEDURE — 1036F TOBACCO NON-USER: CPT | Performed by: INTERNAL MEDICINE

## 2023-08-09 PROCEDURE — 99214 OFFICE O/P EST MOD 30 MIN: CPT | Performed by: INTERNAL MEDICINE

## 2023-08-09 RX ORDER — CLOPIDOGREL BISULFATE 75 MG/1
75 TABLET ORAL EVERY MORNING
COMMUNITY
Start: 2023-07-23

## 2023-08-09 RX ORDER — HYDROCODONE BITARTRATE AND ACETAMINOPHEN 5; 325 MG/1; MG/1
1 TABLET ORAL EVERY 6 HOURS PRN
COMMUNITY
Start: 2023-07-23

## 2023-08-09 NOTE — PATIENT INSTRUCTIONS
Continue current therapy with Jakafi  Continue Coumadin 9 mg daily  INR weekly  Monitor INR and adjust coumadin accordingly (coumadin clinic or us)  Rv in 3 months.  Need cbc, cmp ferritin prior to RV

## 2023-08-17 ENCOUNTER — TELEPHONE (OUTPATIENT)
Dept: ONCOLOGY | Age: 73
End: 2023-08-17

## 2023-08-17 DIAGNOSIS — D68.59 HYPERCOAGULABLE STATE (HCC): ICD-10-CM

## 2023-08-17 NOTE — TELEPHONE ENCOUNTER
Called and left a message with Rashad Perez that no PT/INR was found in his chart. Informed he was to have a lab draw weekly to see if adjustment was needed with his Coumadin. Asked pt to call and inform office where he had his labs drawn at or if he forgot to get it drawn. Call back number given.

## 2023-08-17 NOTE — TELEPHONE ENCOUNTER
Called pt to alert him that his INR was 1.3. This result was reported to Dr. Rojas Wall and he would like patient to increase dose of   Coumadin to 10mg daily and repeat PT/INR in 1 week. Left message with patient. Instructed pt on dose increase and to get labs drawn next Tuesday. Instructed patient to call office if he has any questions. Left office number on patient's voicemail.

## 2023-09-06 ENCOUNTER — TELEPHONE (OUTPATIENT)
Dept: ONCOLOGY | Age: 73
End: 2023-09-06

## 2023-09-06 NOTE — TELEPHONE ENCOUNTER
Left voice mail informing Dr. Kit Shah reviewed INR and recommends continuing warfarin at 9 mg daily and repeat INR in 1 week.

## 2023-10-11 ENCOUNTER — TELEPHONE (OUTPATIENT)
Dept: ONCOLOGY | Age: 73
End: 2023-10-11

## 2023-10-11 NOTE — TELEPHONE ENCOUNTER
Spoke with patient; informed of INR in range and to continue same dose warfarin. Pt verbalizes understanding.

## 2023-10-18 ENCOUNTER — TELEPHONE (OUTPATIENT)
Dept: ONCOLOGY | Age: 73
End: 2023-10-18

## 2023-10-18 NOTE — TELEPHONE ENCOUNTER
Left patient voice mail informing Dr. Samual Babinski reviewed INR and recommends continuing warfarin at current dose of 9 mg daily. Repeat INR In 1 week.

## 2023-11-08 ENCOUNTER — OFFICE VISIT (OUTPATIENT)
Dept: ONCOLOGY | Age: 73
End: 2023-11-08
Payer: COMMERCIAL

## 2023-11-08 VITALS
HEART RATE: 71 BPM | DIASTOLIC BLOOD PRESSURE: 46 MMHG | SYSTOLIC BLOOD PRESSURE: 134 MMHG | BODY MASS INDEX: 24.08 KG/M2 | TEMPERATURE: 96.8 F | WEIGHT: 172 LBS | RESPIRATION RATE: 18 BRPM | HEIGHT: 71 IN

## 2023-11-08 DIAGNOSIS — D47.1 MYELOPROLIFERATIVE DISORDER (HCC): Primary | ICD-10-CM

## 2023-11-08 DIAGNOSIS — D45 POLYCYTHEMIA RUBRA VERA (HCC): ICD-10-CM

## 2023-11-08 PROCEDURE — 3075F SYST BP GE 130 - 139MM HG: CPT | Performed by: INTERNAL MEDICINE

## 2023-11-08 PROCEDURE — G8428 CUR MEDS NOT DOCUMENT: HCPCS | Performed by: INTERNAL MEDICINE

## 2023-11-08 PROCEDURE — 99214 OFFICE O/P EST MOD 30 MIN: CPT | Performed by: INTERNAL MEDICINE

## 2023-11-08 PROCEDURE — G8420 CALC BMI NORM PARAMETERS: HCPCS | Performed by: INTERNAL MEDICINE

## 2023-11-08 PROCEDURE — G8484 FLU IMMUNIZE NO ADMIN: HCPCS | Performed by: INTERNAL MEDICINE

## 2023-11-08 PROCEDURE — 3017F COLORECTAL CA SCREEN DOC REV: CPT | Performed by: INTERNAL MEDICINE

## 2023-11-08 PROCEDURE — 3078F DIAST BP <80 MM HG: CPT | Performed by: INTERNAL MEDICINE

## 2023-11-08 PROCEDURE — 1036F TOBACCO NON-USER: CPT | Performed by: INTERNAL MEDICINE

## 2023-11-08 PROCEDURE — 1123F ACP DISCUSS/DSCN MKR DOCD: CPT | Performed by: INTERNAL MEDICINE

## 2023-11-08 RX ORDER — ATORVASTATIN CALCIUM 40 MG/1
40 TABLET, FILM COATED ORAL
COMMUNITY
Start: 2023-10-20

## 2023-11-08 RX ORDER — FERROUS SULFATE 325(65) MG
1 TABLET ORAL
Qty: 30 TABLET | Refills: 5 | Status: SHIPPED | OUTPATIENT
Start: 2023-11-08

## 2023-11-08 RX ORDER — COLCHICINE 0.6 MG/1
0.6 TABLET ORAL 2 TIMES DAILY
COMMUNITY
Start: 2023-10-11

## 2023-11-08 RX ORDER — FERROUS SULFATE 325(65) MG
TABLET ORAL
Qty: 30 TABLET | Refills: 5 | OUTPATIENT
Start: 2023-11-08

## 2023-11-08 NOTE — PROGRESS NOTES
This note is created with the assistance of a speech recognition program.  While intending to generate a document that actually reflects the content of the visit, the document can still have some errors including those of syntax and sound a like substitutions which may escape proof reading. It such instances, actual meaning can be extrapolated by contextual diversion.

## 2023-11-08 NOTE — PATIENT INSTRUCTIONS
Continue current therapy with Jakafi  Continue Coumadin   Rv in 3 months.  Need cbc, cmp ferritin soon and prior to RV

## 2023-11-09 ENCOUNTER — OFFICE VISIT (OUTPATIENT)
Dept: UROLOGY | Age: 73
End: 2023-11-09
Payer: COMMERCIAL

## 2023-11-09 VITALS
TEMPERATURE: 97.7 F | SYSTOLIC BLOOD PRESSURE: 128 MMHG | DIASTOLIC BLOOD PRESSURE: 60 MMHG | WEIGHT: 172 LBS | BODY MASS INDEX: 23.99 KG/M2

## 2023-11-09 DIAGNOSIS — N40.1 BPH WITH OBSTRUCTION/LOWER URINARY TRACT SYMPTOMS: Primary | ICD-10-CM

## 2023-11-09 DIAGNOSIS — Z12.5 PROSTATE CANCER SCREENING: ICD-10-CM

## 2023-11-09 DIAGNOSIS — N13.8 BPH WITH OBSTRUCTION/LOWER URINARY TRACT SYMPTOMS: Primary | ICD-10-CM

## 2023-11-09 DIAGNOSIS — R31.0 GROSS HEMATURIA: ICD-10-CM

## 2023-11-09 PROCEDURE — 51798 US URINE CAPACITY MEASURE: CPT | Performed by: NURSE PRACTITIONER

## 2023-11-09 PROCEDURE — G8427 DOCREV CUR MEDS BY ELIG CLIN: HCPCS | Performed by: NURSE PRACTITIONER

## 2023-11-09 PROCEDURE — 3017F COLORECTAL CA SCREEN DOC REV: CPT | Performed by: NURSE PRACTITIONER

## 2023-11-09 PROCEDURE — 1123F ACP DISCUSS/DSCN MKR DOCD: CPT | Performed by: NURSE PRACTITIONER

## 2023-11-09 PROCEDURE — G8420 CALC BMI NORM PARAMETERS: HCPCS | Performed by: NURSE PRACTITIONER

## 2023-11-09 PROCEDURE — 3078F DIAST BP <80 MM HG: CPT | Performed by: NURSE PRACTITIONER

## 2023-11-09 PROCEDURE — 1036F TOBACCO NON-USER: CPT | Performed by: NURSE PRACTITIONER

## 2023-11-09 PROCEDURE — 3074F SYST BP LT 130 MM HG: CPT | Performed by: NURSE PRACTITIONER

## 2023-11-09 PROCEDURE — 99213 OFFICE O/P EST LOW 20 MIN: CPT | Performed by: NURSE PRACTITIONER

## 2023-11-09 PROCEDURE — G8484 FLU IMMUNIZE NO ADMIN: HCPCS | Performed by: NURSE PRACTITIONER

## 2023-11-09 RX ORDER — TAMSULOSIN HYDROCHLORIDE 0.4 MG/1
CAPSULE ORAL
Qty: 90 CAPSULE | Refills: 3 | Status: SHIPPED | OUTPATIENT
Start: 2023-11-09

## 2023-11-09 ASSESSMENT — ENCOUNTER SYMPTOMS
EYE REDNESS: 0
COLOR CHANGE: 0
COUGH: 0
ABDOMINAL PAIN: 0
SHORTNESS OF BREATH: 0
BACK PAIN: 0
WHEEZING: 0
CONSTIPATION: 0
VOMITING: 0
NAUSEA: 0

## 2023-11-09 NOTE — PATIENT INSTRUCTIONS
SURVEY:    You may be receiving a survey from [employer] regarding your visit today. Please complete the survey to enable us to provide the highest quality of care to you and your family. If you cannot score us a very good on any question, please call the office to discuss how we could have made your experience a very good one. Thank you.

## 2023-11-09 NOTE — PROGRESS NOTES
HPI:          Patient is a 68 y.o. male in no acute distress. He is alert and oriented to person, place, and time. History  Previous patient of Dr. Beatrice Lyons     Previous patient of Dr. Star Leventhal for gross hematuria, elevated PSA, urethral stricture, and BPH.      9/2020 Referred here by Dr. Joann Rodriguez for erectile dysfunction. Has tried 50 mg Viagra in the past.  This was not helpful. This was sometime ago and he is unsure if he took the medication appropriately. Offered Trimix patient declined    1/2022 -gross hematuria-CT urogram was negative for significant  abnormalities other than an enlarged prostate. Cystoscopy: No bladder lesions, extensive trilobar prostate   Clot retention after cystoscopy requiring hospitalization     PSA  10/2023 - 2.68  11/2022 - 3.58  11/2021 - 2.75  9/2020 - 2.61    Today  Here today to follow-up for prostate cancer screening, BPH and hematuria. He denies frequency, urgency, nocturia or incontinence. He is taking Flomax daily. PVR is low. He denies any episodes of dysuria or gross hematuria. He did have a UA in 2/2023 that was negative for microscopic hematuria. Most recent PSA is 2.68. This is low and stable for patient.     Past Medical History:   Diagnosis Date    Blood disorder     Diabetes mellitus ([address])     Hypercoagulable state ([address])     Factor V Leiden and Lupus anticoagulant - on long term anticoagulation    Hypertension      Past Surgical History:   Procedure Laterality Date    CYST REMOVAL      cyst removal on back    CYSTOSCOPY      Patient states 3 or 4 years ago    [address] Right 2014    TOE SURGERY Right     bone spur right great toe    US GUIDED PERICARDIOCENTESIS  8/24/2023    US GUIDED PERICARDIOCENTESIS 8/24/2023     Outpatient Encounter Medications as of 11/9/2023   Medication Sig Dispense Refill    ferrous sulfate (FEROSUL) 325 (65 Fe) MG tablet Take 1 tablet by mouth daily (with breakfast) 30 tablet 5    atorvastatin

## 2023-11-15 ENCOUNTER — TELEPHONE (OUTPATIENT)
Dept: ONCOLOGY | Age: 73
End: 2023-11-15

## 2023-11-15 NOTE — TELEPHONE ENCOUNTER
Left voice mail informing patient that Dr. Maru Garcia reviewed INR and recommends continuing warfarin at current dose of 9 mg.

## 2023-11-28 RX ORDER — RUXOLITINIB 10 MG/1
10 TABLET ORAL 2 TIMES DAILY
Qty: 60 TABLET | Refills: 5 | Status: ACTIVE | OUTPATIENT
Start: 2023-11-28

## 2024-02-07 ENCOUNTER — TELEPHONE (OUTPATIENT)
Dept: ONCOLOGY | Age: 74
End: 2024-02-07

## 2024-02-07 NOTE — TELEPHONE ENCOUNTER
Called patient and left voice mail informing Dr. Orosco reviewed INR and recommends continuing warfarin 9 mg daily and repeat INR in 1 week instead of 2 weeks.

## 2024-02-28 ENCOUNTER — OFFICE VISIT (OUTPATIENT)
Dept: ONCOLOGY | Age: 74
End: 2024-02-28
Payer: COMMERCIAL

## 2024-02-28 VITALS
SYSTOLIC BLOOD PRESSURE: 145 MMHG | HEART RATE: 69 BPM | RESPIRATION RATE: 18 BRPM | BODY MASS INDEX: 24.69 KG/M2 | WEIGHT: 177 LBS | TEMPERATURE: 95.8 F | DIASTOLIC BLOOD PRESSURE: 49 MMHG

## 2024-02-28 DIAGNOSIS — D47.1 MYELOPROLIFERATIVE DISORDER (HCC): ICD-10-CM

## 2024-02-28 DIAGNOSIS — D45 POLYCYTHEMIA RUBRA VERA (HCC): Primary | ICD-10-CM

## 2024-02-28 DIAGNOSIS — D68.59 HYPERCOAGULABLE STATE (HCC): ICD-10-CM

## 2024-02-28 DIAGNOSIS — D45 POLYCYTHEMIA RUBRA VERA (HCC): ICD-10-CM

## 2024-02-28 DIAGNOSIS — D47.1 MYELOPROLIFERATIVE DISORDER (HCC): Primary | ICD-10-CM

## 2024-02-28 PROCEDURE — G8427 DOCREV CUR MEDS BY ELIG CLIN: HCPCS | Performed by: INTERNAL MEDICINE

## 2024-02-28 PROCEDURE — 1036F TOBACCO NON-USER: CPT | Performed by: INTERNAL MEDICINE

## 2024-02-28 PROCEDURE — 3077F SYST BP >= 140 MM HG: CPT | Performed by: INTERNAL MEDICINE

## 2024-02-28 PROCEDURE — 3078F DIAST BP <80 MM HG: CPT | Performed by: INTERNAL MEDICINE

## 2024-02-28 PROCEDURE — 1123F ACP DISCUSS/DSCN MKR DOCD: CPT | Performed by: INTERNAL MEDICINE

## 2024-02-28 PROCEDURE — 3017F COLORECTAL CA SCREEN DOC REV: CPT | Performed by: INTERNAL MEDICINE

## 2024-02-28 PROCEDURE — G8420 CALC BMI NORM PARAMETERS: HCPCS | Performed by: INTERNAL MEDICINE

## 2024-02-28 PROCEDURE — G8484 FLU IMMUNIZE NO ADMIN: HCPCS | Performed by: INTERNAL MEDICINE

## 2024-02-28 PROCEDURE — 99214 OFFICE O/P EST MOD 30 MIN: CPT | Performed by: INTERNAL MEDICINE

## 2024-02-28 RX ORDER — FERROUS SULFATE 325(65) MG
1 TABLET ORAL
Qty: 30 TABLET | Refills: 5 | Status: CANCELLED | OUTPATIENT
Start: 2024-02-28

## 2024-02-28 NOTE — PATIENT INSTRUCTIONS
Adjusted Jakafi dose to 5 mg BID due to thrombocytopenia  Continue Coumadin   Rv in 3 months. Need cbc, cmp ferritin soon and prior to RV

## 2024-02-28 NOTE — PROGRESS NOTES
Chief Complaint   Patient presents with    Follow-up     Myeloproliferative 11/7 INR 2.1    Medication Refill     Ferosul pending       DIAGNOSIS:   History of myeloproliferative disorder , Malcom 2+, likely polycythemia diagnosed in 2016  Factor V Leiden mutation and lupus anticoagulant  Spent phase polycythemia/myelofibrosis transformation diagnosed in 2019  CURRENT THERAPY:  Started hydroxyurea and aspirin  Upon diagnosis of myelofibrosis, transition to Jakafi.  Dose adjusted due to thrombocytopenia.  Long-term anticoagulation with Coumadin  BRIEF CASE HISTORY:   Uriel Gutierrez is a very pleasant 74 y.o. male who was followed by different hematologist, he was diagnosed with myeloproliferative syndrome likely polycythemia vera where he presented with polycythemia and thrombocytosis and he was Malcom 2+.  He was started on hydroxyurea for multiple years.  In 2019, he started having worsening cytopenias and bone marrow aspiration biopsy showed hypercellular marrow but increased megakaryocyte and increased fibrotic changes suggestive of spent phase polycythemia/myelofibrosis.  He was started on Jakafi.  The patient also has a history of hypercoagulable condition namely factor V Leiden and lupus anticoagulant and he is on long-term anticoagulation.  INTERIM HISTORY:  The patient comes in today for a follow up, feeling well and doing well .  He is on Jakafi.  Tolerated well.  No side effects except for worsening thrombocytopenia.  Dose will be adjusted.  Patient had cardiac surgery with multiple stents and valve replacement.  No complications.  Recovered well.  INR has been therapeutic.  Other than bruising, he has no complaints.  He is fairly active without limitation      PAST MEDICAL HISTORY: has a past medical history of Blood disorder, Diabetes mellitus (HCC), Hypercoagulable state (HCC), and Hypertension.    PAST SURGICAL HISTORY: has a past surgical history that includes Rotator cuff repair (Right, 2014);

## 2024-03-14 ENCOUNTER — TELEPHONE (OUTPATIENT)
Dept: ONCOLOGY | Age: 74
End: 2024-03-14

## 2024-03-14 NOTE — TELEPHONE ENCOUNTER
Call to patient to inform INR this week is 3.3; continue same dose medication and recheck labs in 2 weeks.

## 2024-04-17 NOTE — PROGRESS NOTES
Physical Therapy      Patient Name:  Max Squires   MRN:  [id number]    PT to hold this afternoon. Per NSG, pt is now on 10 L of O2 on oxymask, and O2 decreases with turning. Will f/u tomorrow.     Spoke with Dr Ambar Contreras and provided updates. Dr Ambar Contreras okay with patient going home today as ling as he is cleared by hospitalist. Will follow up with him in the office sometime this week. Physician stated that his office would call him tomorrow to set up an appt.

## 2024-04-24 ENCOUNTER — TELEPHONE (OUTPATIENT)
Dept: ONCOLOGY | Age: 74
End: 2024-04-24

## 2024-04-24 NOTE — TELEPHONE ENCOUNTER
Informed Ray his INR is 1.5 and per Dr. Farah continue same dose medication and repeat labs in 2 weeks.

## 2024-05-08 ENCOUNTER — TELEPHONE (OUTPATIENT)
Dept: ONCOLOGY | Age: 74
End: 2024-05-08

## 2024-05-08 NOTE — TELEPHONE ENCOUNTER
Called to tell Jarod INR remains at 1.4 this week.  Explained that he should take 9 mg warfarin/coumadin 5 days per week.  He should take 12 mg warfarin/coumadin on Tuesday and Thursday.  Requested he return call to ensure he got this message.  Await return call.    Spoke with patient today and clarified the regimen for medication.  He will take increased dose on Tuesday and Thursday.  Writer will also send refill to pharmacy.

## 2024-05-09 DIAGNOSIS — D68.59 HYPERCOAGULABLE STATE (HCC): ICD-10-CM

## 2024-05-09 RX ORDER — WARFARIN SODIUM 5 MG/1
TABLET ORAL
Qty: 90 TABLET | Refills: 0 | Status: SHIPPED | OUTPATIENT
Start: 2024-05-09

## 2024-05-09 RX ORDER — WARFARIN SODIUM 2 MG/1
TABLET ORAL
Qty: 90 TABLET | Refills: 0 | Status: SHIPPED | OUTPATIENT
Start: 2024-05-09

## 2024-05-09 NOTE — TELEPHONE ENCOUNTER
Due to adjusted dose per physician order will reorder medication refills for patient to ensure does not run out.

## 2024-05-29 ENCOUNTER — OFFICE VISIT (OUTPATIENT)
Dept: ONCOLOGY | Age: 74
End: 2024-05-29
Payer: COMMERCIAL

## 2024-05-29 VITALS
RESPIRATION RATE: 18 BRPM | TEMPERATURE: 96.5 F | WEIGHT: 172 LBS | DIASTOLIC BLOOD PRESSURE: 57 MMHG | SYSTOLIC BLOOD PRESSURE: 133 MMHG | HEART RATE: 76 BPM | BODY MASS INDEX: 23.99 KG/M2

## 2024-05-29 DIAGNOSIS — D47.1 MYELOPROLIFERATIVE DISORDER (HCC): ICD-10-CM

## 2024-05-29 DIAGNOSIS — D45 POLYCYTHEMIA RUBRA VERA (HCC): Primary | ICD-10-CM

## 2024-05-29 PROCEDURE — G8427 DOCREV CUR MEDS BY ELIG CLIN: HCPCS | Performed by: INTERNAL MEDICINE

## 2024-05-29 PROCEDURE — 3017F COLORECTAL CA SCREEN DOC REV: CPT | Performed by: INTERNAL MEDICINE

## 2024-05-29 PROCEDURE — 99214 OFFICE O/P EST MOD 30 MIN: CPT | Performed by: INTERNAL MEDICINE

## 2024-05-29 PROCEDURE — 1036F TOBACCO NON-USER: CPT | Performed by: INTERNAL MEDICINE

## 2024-05-29 PROCEDURE — 3078F DIAST BP <80 MM HG: CPT | Performed by: INTERNAL MEDICINE

## 2024-05-29 PROCEDURE — G8420 CALC BMI NORM PARAMETERS: HCPCS | Performed by: INTERNAL MEDICINE

## 2024-05-29 PROCEDURE — 1123F ACP DISCUSS/DSCN MKR DOCD: CPT | Performed by: INTERNAL MEDICINE

## 2024-05-29 PROCEDURE — 3075F SYST BP GE 130 - 139MM HG: CPT | Performed by: INTERNAL MEDICINE

## 2024-05-29 RX ORDER — MAGNESIUM OXIDE 400 MG/1
1 TABLET ORAL EVERY MORNING
COMMUNITY
Start: 2024-05-22

## 2024-05-29 RX ORDER — GLIPIZIDE 10 MG/1
1 TABLET, FILM COATED, EXTENDED RELEASE ORAL DAILY
COMMUNITY
Start: 2024-03-12

## 2024-05-29 RX ORDER — EMPAGLIFLOZIN 10 MG/1
1 TABLET, FILM COATED ORAL EVERY MORNING
COMMUNITY
Start: 2024-05-07

## 2024-05-29 RX ORDER — CHLORAL HYDRATE 500 MG
CAPSULE ORAL
COMMUNITY

## 2024-05-29 NOTE — PATIENT INSTRUCTIONS
Adjusted Jakafi dose to 5 mg daily due to thrombocytopenia  Continue Coumadin   Rv in 3 months. Need cbc, cmp ferritin soon and prior to RV

## 2024-05-29 NOTE — PROGRESS NOTES
MD [facility]                            This note is created with the assistance of a speech recognition program.  While intending to generate a document that actually reflects the content of the visit, the document can still have some errors including those of syntax and sound a like substitutions which may escape proof reading.  It such instances, actual meaning can be extrapolated by contextual diversion.

## 2024-06-27 ENCOUNTER — TELEPHONE (OUTPATIENT)
Dept: ONCOLOGY | Age: 74
End: 2024-06-27

## 2024-06-27 NOTE — TELEPHONE ENCOUNTER
Yesterday discussions with Jarod were to hold coumadin Wednesday, Thursday, Friday and to get INR checked next week.  When I spoke with Jarod, he indicated physician had ordered additional antibiotics due to accident he had with his hands.  Spoke with Jarod 6/27/24 at 0845 am and he is aware to continue to hold coumadin tonight and tomorrow night.  He will get INR checked early tomorrow so I can notify Dr. Orosco of result and have plan for additional coumadin and testing.

## 2024-06-28 ENCOUNTER — TELEPHONE (OUTPATIENT)
Dept: ONCOLOGY | Age: 74
End: 2024-06-28

## 2024-06-28 NOTE — TELEPHONE ENCOUNTER
Spoke with Dr. Orosco; informed inr 1.8 today from 9.4 on 6/25/24.  Explained Jarod is no longer on antibiotics.  Per physician Ray to resume his previous dose of coumadin tonight and recheck INR on Wednesday.    Writer then spoke with Jarod; explained to return to 9 mg on M, W, F, S&S and 12 mg on Tues/Thurs.  He is to get INR checked again on Wednesday 7/3/24.  Patient verbalizes understanding.

## 2024-07-18 RX ORDER — WARFARIN SODIUM 1 MG/1
TABLET ORAL
Qty: 60 TABLET | Refills: 1 | Status: SHIPPED | OUTPATIENT
Start: 2024-07-18

## 2024-07-18 NOTE — TELEPHONE ENCOUNTER
Uriel calls back to confirm coumadin/warfarin dosing.  He did take medication last night prior to my leaving him a message.  He is going to hold medication tonight and tomorrow will begin taking 7.5 mg daily and have his INR checked again next week.  He only had 5 mg and 2 mg tablets so writer did send prescription to his pharmacy for 1 mg tabs which he can cut in half as needed.

## 2024-07-25 RX ORDER — ENOXAPARIN SODIUM 150 MG/ML
INJECTION SUBCUTANEOUS
Qty: 12 EACH | Refills: 0 | Status: SHIPPED | OUTPATIENT
Start: 2024-07-25

## 2024-07-25 RX ORDER — ENOXAPARIN SODIUM 150 MG/ML
INJECTION SUBCUTANEOUS
Qty: 12 EACH | Refills: 0 | Status: SHIPPED | OUTPATIENT
Start: 2024-07-25 | End: 2024-07-25 | Stop reason: SDUPTHER

## 2024-07-25 NOTE — TELEPHONE ENCOUNTER
Patient asking if he is OK to have colonoscopy.  Discussed with Dr. Farah and he states patient will need Lovenox bridge.  Prescription sent to physician for lovenox authorization.  He requests daily dose of 1.5 mg/kg - which calculates to 117 mg; physician states round to 120 mg injection daily.    Patient to stop coumadin 5 days prior to procedure and begin lovenox; he then will hold lovenox on day of procedure.  After procedure continue lovenox until INR is therapeutic with coumadin.

## 2024-08-01 ENCOUNTER — TELEPHONE (OUTPATIENT)
Dept: ONCOLOGY | Age: 74
End: 2024-08-01

## 2024-08-01 NOTE — TELEPHONE ENCOUNTER
Spoke with patient last night and informed him to continue to take the 8 mg warfarin daily for his INR of 2.2.  Patient verbalizes understanding.

## 2024-08-07 ENCOUNTER — TELEPHONE (OUTPATIENT)
Dept: ONCOLOGY | Age: 74
End: 2024-08-07

## 2024-08-07 NOTE — TELEPHONE ENCOUNTER
Called Ray to inform him of INR 8/6/24 of 3.2.  Message left that he should continue to take same dose of 8 mg warfarin daily.  Requested call back if he had any questions.

## 2024-08-09 ENCOUNTER — TELEPHONE (OUTPATIENT)
Dept: INFUSION THERAPY | Age: 74
End: 2024-08-09

## 2024-08-19 RX ORDER — RUXOLITINIB 10 MG/1
10 TABLET ORAL 2 TIMES DAILY
Qty: 60 TABLET | Refills: 5 | OUTPATIENT
Start: 2024-08-19

## 2024-08-22 ENCOUNTER — TELEPHONE (OUTPATIENT)
Dept: ONCOLOGY | Age: 74
End: 2024-08-22

## 2024-08-22 NOTE — TELEPHONE ENCOUNTER
Message left for Ray to inform him that physician would like him to continue the Lovenox injections through the weekend and to have INR checked on Monday.  Informed him to call with questions; and that I will call him Monday with further plan and coumadin dosing etc.

## 2024-08-27 ENCOUNTER — TELEPHONE (OUTPATIENT)
Dept: ONCOLOGY | Age: 74
End: 2024-08-27

## 2024-08-27 NOTE — TELEPHONE ENCOUNTER
Called patient and left a message. Pt was expected to have lab work drawn on Monday 8/26/24. No results were available in the chart. Called [facility] lab where patient usually has lab work drawn and they stated last lab draw was on 8/20/24. Asked pt to call and let staff know where he had labs drawn or if not drawn when he will get labs drawn. Left call back #[phone].  Pt called back and states he has a doctor visit with Dr Holly on Wednesday 8/28/24 and will get labs drawn tomorrow morning about 0800 and then see the doctor in the afternoon at 1:15pm.

## 2024-08-28 ENCOUNTER — OFFICE VISIT (OUTPATIENT)
Dept: ONCOLOGY | Age: 74
End: 2024-08-28
Payer: COMMERCIAL

## 2024-08-28 VITALS
BODY MASS INDEX: 23.85 KG/M2 | WEIGHT: 171 LBS | HEART RATE: 70 BPM | SYSTOLIC BLOOD PRESSURE: 146 MMHG | RESPIRATION RATE: 18 BRPM | DIASTOLIC BLOOD PRESSURE: 52 MMHG | TEMPERATURE: 96.8 F

## 2024-08-28 DIAGNOSIS — D45 POLYCYTHEMIA RUBRA VERA (HCC): Primary | ICD-10-CM

## 2024-08-28 DIAGNOSIS — D68.59 HYPERCOAGULABLE STATE (HCC): ICD-10-CM

## 2024-08-28 DIAGNOSIS — D75.81 MYELOFIBROSIS (HCC): ICD-10-CM

## 2024-08-28 DIAGNOSIS — D47.1 MYELOPROLIFERATIVE DISORDER (HCC): ICD-10-CM

## 2024-08-28 PROCEDURE — 1123F ACP DISCUSS/DSCN MKR DOCD: CPT | Performed by: INTERNAL MEDICINE

## 2024-08-28 PROCEDURE — G8420 CALC BMI NORM PARAMETERS: HCPCS | Performed by: INTERNAL MEDICINE

## 2024-08-28 PROCEDURE — 1036F TOBACCO NON-USER: CPT | Performed by: INTERNAL MEDICINE

## 2024-08-28 PROCEDURE — 3078F DIAST BP <80 MM HG: CPT | Performed by: INTERNAL MEDICINE

## 2024-08-28 PROCEDURE — 3017F COLORECTAL CA SCREEN DOC REV: CPT | Performed by: INTERNAL MEDICINE

## 2024-08-28 PROCEDURE — 3077F SYST BP >= 140 MM HG: CPT | Performed by: INTERNAL MEDICINE

## 2024-08-28 PROCEDURE — G8428 CUR MEDS NOT DOCUMENT: HCPCS | Performed by: INTERNAL MEDICINE

## 2024-08-28 PROCEDURE — 99214 OFFICE O/P EST MOD 30 MIN: CPT | Performed by: INTERNAL MEDICINE

## 2024-08-28 NOTE — PROGRESS NOTES
Chief Complaint   Patient presents with    Follow-up     Polycythemia aleyda vera       DIAGNOSIS:   History of myeloproliferative disorder , Malcom 2+, likely polycythemia diagnosed in 2016  Factor V Leiden mutation and lupus anticoagulant  Spent phase polycythemia/myelofibrosis transformation diagnosed in 2019  CURRENT THERAPY:  Started hydroxyurea and aspirin  Upon diagnosis of myelofibrosis, transition to Jakafi.  Dose adjusted due to thrombocytopenia.  Long-term anticoagulation with Coumadin  BRIEF CASE HISTORY:   Uriel Gutierrez is a very pleasant 74 y.o. male who was followed by different hematologist, he was diagnosed with myeloproliferative syndrome likely polycythemia vera where he presented with polycythemia and thrombocytosis and he was Malcom 2+.  He was started on hydroxyurea for multiple years.  In 2019, he started having worsening cytopenias and bone marrow aspiration biopsy showed hypercellular marrow but increased megakaryocyte and increased fibrotic changes suggestive of spent phase polycythemia/myelofibrosis.  He was started on Jakafi.  The patient also has a history of hypercoagulable condition namely factor V Leiden and lupus anticoagulant and he is on long-term anticoagulation.  INTERIM HISTORY:  The patient comes in today for a follow up, feeling well and doing well .  He is on Jakafi.  Tolerated well.  No side effects except for worsening thrombocytopenia.  Dose was adjusted.  Patient had cardiac surgery with multiple stents and valve replacement.  No complications.  Recovered well.  INR has been therapeutic.  Other than bruising, he has no complaints.  He is fairly active without limitation  Had recent TIA.       PAST MEDICAL HISTORY: has a past medical history of Blood disorder, Diabetes mellitus (HCC), Hypercoagulable state (HCC), and Hypertension.    PAST SURGICAL HISTORY: has a past surgical history that includes Rotator cuff repair (Right, 2014); Cystoscopy; Toe Surgery (Right); cyst removal;  and US GUIDED PERICARDIOCENTESIS (8/24/2023).     CURRENT MEDICATIONS:  has a current medication list which includes the following prescription(s): ruxolitinib phosphate, enoxaparin, warfarin, jardiance, glipizide, fish oil, magnesium oxide, warfarin, tamsulosin, atorvastatin, colchicine, clopidogrel, steglatro, multiple vitamin, omega-3 fatty acids-vitamin e, lisinopril-hydrochlorothiazide, kombiglyze xr, primidone, and ra omeprazole.    ALLERGIES:  is allergic to no known allergies.    FAMILY HISTORY: Negative for any hematological or oncological conditions.     SOCIAL HISTORY:  reports that he quit smoking about 43 years ago. His smoking use included cigarettes. He started smoking about 58 years ago. He has a 7.5 pack-year smoking history. He has never used smokeless tobacco. He reports that he does not currently use alcohol. He reports that he does not use drugs.    REVIEW OF SYSTEMS:  General: no fever or night sweats, Weight is stable.  ENT: No double or blurred vision, no tinnitus or hearing problem, no dysphagia or sore throat   Respiratory: No chest pain, no shortness of breath, no cough or hemoptysis.  Cardiovascular: Denies chest pain, PND or orthopnea. No L E swelling or palpitations.   Gastrointestinal:    No nausea or vomiting, abdominal pain, diarrhea or constipation.    Genitourinary: Denies dysuria, hematuria, frequency, urgency or incontinence.    Neurological: Denies headaches, decreased LOC, no sensory or motor focal deficits.   Musculoskeletal:  No arthralgia no back pain or joint swelling.   Skin: There are no rashes or bleeding.  Easy bruisability secondary to anticoagulation  Psychiatric:  No anxiety, no depression.   Endocrine: no diabetes or thyroid disease.   Hematologic: no bleeding , no adenopathy.     PHYSICAL EXAM: Shows a well appearing 74 y.o.-year-old male who is not in pain or distress. Vital Signs: Blood pressure (!) 146/52, pulse 70, temperature 96.8 °F (36 °C), temperature

## 2024-08-29 ENCOUNTER — TELEPHONE (OUTPATIENT)
Dept: ONCOLOGY | Age: 74
End: 2024-08-29

## 2024-08-29 NOTE — TELEPHONE ENCOUNTER
Confirmed with Jarod that he did understand from physician yesterday to take Jakafi once daily.  This will be 5 mg once daily; Ray verbalizes understanding and states new refill to arrive tomorrow.

## 2024-09-03 ENCOUNTER — TELEPHONE (OUTPATIENT)
Dept: UROLOGY | Age: 74
End: 2024-09-03

## 2024-09-03 DIAGNOSIS — R31.0 GROSS HEMATURIA: Primary | ICD-10-CM

## 2024-09-03 NOTE — TELEPHONE ENCOUNTER
Please have him drop off a urine culture, to rule out infection as a source of his gross hematuria    It has been over 2 years since his last gross hematuria workup.  We do need to see him in the office to discuss repeat workup, please schedule him within the next few weeks

## 2024-09-03 NOTE — TELEPHONE ENCOUNTER
Mr Gutierrez called this afternoon to reschedule his upcoming visit. During this time he notified the writer he has been having some blood in his urine since yesterday 09.02.2024. He stated the blood is not that bad; he just wanted to make us aware.     Please advise

## 2024-09-16 ENCOUNTER — OFFICE VISIT (OUTPATIENT)
Dept: UROLOGY | Age: 74
End: 2024-09-16
Payer: COMMERCIAL

## 2024-09-16 ENCOUNTER — HOSPITAL ENCOUNTER (OUTPATIENT)
Age: 74
Discharge: HOME | End: 2024-09-16
Payer: COMMERCIAL

## 2024-09-16 VITALS
SYSTOLIC BLOOD PRESSURE: 139 MMHG | HEIGHT: 71 IN | DIASTOLIC BLOOD PRESSURE: 60 MMHG | RESPIRATION RATE: 18 BRPM | WEIGHT: 171.6 LBS | HEART RATE: 97 BPM | BODY MASS INDEX: 24.02 KG/M2 | TEMPERATURE: 97.8 F

## 2024-09-16 DIAGNOSIS — R35.1 NOCTURIA: ICD-10-CM

## 2024-09-16 DIAGNOSIS — R31.0 GROSS HEMATURIA: ICD-10-CM

## 2024-09-16 DIAGNOSIS — R31.0 GROSS HEMATURIA: Primary | ICD-10-CM

## 2024-09-16 DIAGNOSIS — N40.1 BPH WITH OBSTRUCTION/LOWER URINARY TRACT SYMPTOMS: ICD-10-CM

## 2024-09-16 DIAGNOSIS — N13.8 BPH WITH OBSTRUCTION/LOWER URINARY TRACT SYMPTOMS: ICD-10-CM

## 2024-09-16 PROCEDURE — G8427 DOCREV CUR MEDS BY ELIG CLIN: HCPCS | Performed by: UROLOGY

## 2024-09-16 PROCEDURE — 1036F TOBACCO NON-USER: CPT | Performed by: UROLOGY

## 2024-09-16 PROCEDURE — 87086 URINE CULTURE/COLONY COUNT: CPT

## 2024-09-16 PROCEDURE — 3075F SYST BP GE 130 - 139MM HG: CPT | Performed by: UROLOGY

## 2024-09-16 PROCEDURE — 3017F COLORECTAL CA SCREEN DOC REV: CPT | Performed by: UROLOGY

## 2024-09-16 PROCEDURE — 3078F DIAST BP <80 MM HG: CPT | Performed by: UROLOGY

## 2024-09-16 PROCEDURE — 99214 OFFICE O/P EST MOD 30 MIN: CPT | Performed by: UROLOGY

## 2024-09-16 PROCEDURE — 1123F ACP DISCUSS/DSCN MKR DOCD: CPT | Performed by: UROLOGY

## 2024-09-16 PROCEDURE — G8420 CALC BMI NORM PARAMETERS: HCPCS | Performed by: UROLOGY

## 2024-09-16 PROCEDURE — 51798 US URINE CAPACITY MEASURE: CPT | Performed by: UROLOGY

## 2024-09-16 RX ORDER — HYDROCHLOROTHIAZIDE 25 MG/1
25 TABLET ORAL DAILY
COMMUNITY

## 2024-09-16 RX ORDER — CILOSTAZOL 50 MG/1
50 TABLET ORAL 2 TIMES DAILY
COMMUNITY

## 2024-09-16 RX ORDER — AMLODIPINE BESYLATE 10 MG/1
10 TABLET ORAL DAILY
COMMUNITY

## 2024-09-16 RX ORDER — LISINOPRIL 40 MG/1
40 TABLET ORAL DAILY
COMMUNITY

## 2024-09-16 RX ORDER — OMEPRAZOLE 40 MG/1
40 CAPSULE, DELAYED RELEASE ORAL DAILY
COMMUNITY

## 2024-09-17 LAB
MICROORGANISM SPEC CULT: NO GROWTH
SERVICE CMNT-IMP: NORMAL
SPECIMEN DESCRIPTION: NORMAL

## 2024-09-18 ENCOUNTER — TELEPHONE (OUTPATIENT)
Dept: UROLOGY | Age: 74
End: 2024-09-18

## 2024-09-24 DIAGNOSIS — D75.81 MYELOFIBROSIS (HCC): ICD-10-CM

## 2024-09-24 DIAGNOSIS — D45 POLYCYTHEMIA RUBRA VERA (HCC): ICD-10-CM

## 2024-09-24 DIAGNOSIS — D68.59 HYPERCOAGULABLE STATE (HCC): ICD-10-CM

## 2024-09-25 ENCOUNTER — TELEPHONE (OUTPATIENT)
Dept: ONCOLOGY | Age: 74
End: 2024-09-25

## 2024-10-03 ENCOUNTER — TELEPHONE (OUTPATIENT)
Dept: INFUSION THERAPY | Age: 74
End: 2024-10-03

## 2024-10-03 NOTE — TELEPHONE ENCOUNTER
Called and left a message that Dr Rojas would like him to continue the same Coumadin dose and recheck a PT/INR in one week. Gave him the office number to call if he has any questions.

## 2024-10-09 ENCOUNTER — TELEPHONE (OUTPATIENT)
Dept: ONCOLOGY | Age: 74
End: 2024-10-09

## 2024-10-09 NOTE — TELEPHONE ENCOUNTER
Call to Ray to inform him to continue with same dose warfarin which is 6 mg daily and to have INR checked next week.  Left message with phone number should he have questions and need to return call to us.

## 2024-10-17 ENCOUNTER — TELEPHONE (OUTPATIENT)
Dept: ONCOLOGY | Age: 74
End: 2024-10-17

## 2024-10-17 NOTE — TELEPHONE ENCOUNTER
Called and spoke to Jarod to inform of Dr. Orosco instruction.  His INR is 1.4 this week and has been up and down lately.  Per MD he is to remain on same dose (6 mg daily warfarin daily) and to have INR and CBC checked next week as want to see platelets as well.    Jarod asks if this fluctuation could have anything to do with the Jakafi dose reduction from 10 mg daily to 5 mg daily.  Informed Jarod will discuss this with physician.  Will also fax cbc order to [facility] where he gets his labs completed.

## 2024-10-23 ENCOUNTER — TELEPHONE (OUTPATIENT)
Dept: ONCOLOGY | Age: 74
End: 2024-10-23

## 2024-10-23 NOTE — TELEPHONE ENCOUNTER
Retrieved lab results from [facility]. INR 1.3, reviewed this result with Dr. Farah. Orders received to instruct patient to increase dose to Coumadin 7.5mg daily and recheck PT/INR next week. Called to inform patient of most recent order and message left for him. Instructed patient to call office if he has any questions.

## 2024-10-24 ENCOUNTER — TELEPHONE (OUTPATIENT)
Dept: ONCOLOGY | Age: 74
End: 2024-10-24

## 2024-10-24 DIAGNOSIS — D45 POLYCYTHEMIA RUBRA VERA (HCC): ICD-10-CM

## 2024-10-24 DIAGNOSIS — D75.81 MYELOFIBROSIS (HCC): ICD-10-CM

## 2024-10-24 DIAGNOSIS — D68.59 HYPERCOAGULABLE STATE (HCC): ICD-10-CM

## 2024-10-24 NOTE — TELEPHONE ENCOUNTER
Received a fax from Eileen Morgan [phone] from Dr. Cross office requesting bridging regimen for patinent's coumadin. Pt is scheduled for a lower extremity angiogram on 11/6/24. Request Coumadin be stopped 5 days before treatment and that he should be bridged with Lovenox 80mg bid per Dr. Cross. He will then need bridged after the procedure until therapeutic. Per Dr. Farah they can order the bridging. We will continue to monitor his PT/INR and advise on Coumadin dosing. Relayed this information to Eileen Morgan RN. She states they (Dr. Cross) will order the bridging then.

## 2024-10-31 ENCOUNTER — TELEPHONE (OUTPATIENT)
Dept: ONCOLOGY | Age: 74
End: 2024-10-31

## 2024-10-31 NOTE — TELEPHONE ENCOUNTER
Called Ray and informed him to continue same dose (7.5 mg daily) of warfarin and have INR rechecked next week.  Patient verbalizes understanding.